# Patient Record
Sex: MALE | Race: WHITE | Employment: OTHER | URBAN - METROPOLITAN AREA
[De-identification: names, ages, dates, MRNs, and addresses within clinical notes are randomized per-mention and may not be internally consistent; named-entity substitution may affect disease eponyms.]

---

## 2017-01-23 ENCOUNTER — HOSPITAL ENCOUNTER (OUTPATIENT)
Dept: GENERAL RADIOLOGY | Age: 82
Discharge: HOME | End: 2017-01-23
Payer: MEDICARE

## 2017-01-23 DIAGNOSIS — C32.0 MALIGNANT NEOPLASM OF GLOTTIS (HCC): ICD-10-CM

## 2017-01-23 PROCEDURE — 71020 XR CHEST PA LAT: CPT

## 2017-03-22 ENCOUNTER — OFFICE VISIT (OUTPATIENT)
Dept: INTERNAL MEDICINE CLINIC | Age: 82
End: 2017-03-22

## 2017-03-22 VITALS
DIASTOLIC BLOOD PRESSURE: 71 MMHG | BODY MASS INDEX: 29.03 KG/M2 | OXYGEN SATURATION: 96 % | WEIGHT: 185 LBS | HEART RATE: 64 BPM | RESPIRATION RATE: 20 BRPM | SYSTOLIC BLOOD PRESSURE: 143 MMHG | HEIGHT: 67 IN | TEMPERATURE: 97.9 F

## 2017-03-22 DIAGNOSIS — Z23 ENCOUNTER FOR IMMUNIZATION: ICD-10-CM

## 2017-03-22 DIAGNOSIS — Z13.1 SCREENING FOR DIABETES MELLITUS: ICD-10-CM

## 2017-03-22 DIAGNOSIS — E78.2 MIXED HYPERLIPIDEMIA: ICD-10-CM

## 2017-03-22 DIAGNOSIS — Z13.31 SCREENING FOR DEPRESSION: ICD-10-CM

## 2017-03-22 DIAGNOSIS — Z13.39 SCREENING FOR ALCOHOLISM: ICD-10-CM

## 2017-03-22 DIAGNOSIS — M17.0 ARTHRITIS OF BOTH KNEES: ICD-10-CM

## 2017-03-22 DIAGNOSIS — Z13.6 SCREENING FOR ISCHEMIC HEART DISEASE: ICD-10-CM

## 2017-03-22 DIAGNOSIS — Z00.00 ROUTINE GENERAL MEDICAL EXAMINATION AT A HEALTH CARE FACILITY: Primary | ICD-10-CM

## 2017-03-22 DIAGNOSIS — I10 ESSENTIAL HYPERTENSION: ICD-10-CM

## 2017-03-22 RX ORDER — DICLOFENAC SODIUM 10 MG/G
2 GEL TOPICAL 4 TIMES DAILY
Qty: 1 EACH | Refills: 5 | Status: SHIPPED | OUTPATIENT
Start: 2017-03-22 | End: 2017-06-07

## 2017-03-22 NOTE — PROGRESS NOTES
This is a Subsequent Medicare Annual Wellness Visit providing Personalized Prevention Plan Services (PPPS) (Performed 12 months after initial AWV and PPPS )    I have reviewed the patient's medical history in detail and updated the computerized patient record. History     No c/o, other than his knees continue to bother him quite a bit. Was prescribed NSAIDs but they elevated his blood pressure. At this time not interested in injection and wants to avoid further surgeries. Sees cardiology for his aortic stenosis. No syncope or excessive shortness of breath at this time. Takes his blood pressure medications, no complaints of side effects. 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    Sensorineural hearing loss     as of 5/28/15 pt wears hearing aids    Unspecified adverse effect of anesthesia 1960s    delayed awaking      Past Surgical History:   Procedure Laterality Date    HX CATARACT REMOVAL Bilateral 2004    HX COLONOSCOPY  2006    HX CYST REMOVAL  1960    HX INTRAOCULAR LENS PROSTHESIS      bilateral    HX LUMBAR LAMINECTOMY  2006    [facility] - Dr Navarro Campbell  prior to 2014    and bicep repair    TOTAL KNEE ARTHROPLASTY Right 8/4/14     Current Outpatient Prescriptions   Medication Sig Dispense Refill    diclofenac (VOLTAREN) 1 % gel Apply 2 g to affected area four (4) times daily. 1 Each 5    aspirin delayed-release 81 mg tablet Take  by mouth every morning.  simvastatin (ZOCOR) 20 mg tablet Take 20 mg by mouth nightly.       metoprolol succinate (TOPROL-XL) 50 mg XL tablet Take 50 mg by mouth every evening.  amLODIPine (NORVASC) 5 mg tablet Take 5 mg by mouth every morning.  losartan (COZAAR) 100 mg tablet Take 1 Tab by mouth daily. (Patient taking differently: Take 100 mg by mouth every morning.) 90 Tab 0    DOCOSAHEXANOIC ACID/EPA (FISH OIL PO) Take  by mouth.  multivitamin (ONE A DAY) tablet Take 1 Tab by mouth daily (after dinner). 80 Tab 3     No Known Allergies  Family History   Problem Relation Age of Onset   Velta Ganser Cancer Father      lung    Stroke Mother     Diabetes Mother     Diabetes Sister     Cancer Sister      Social History   Substance Use Topics    Smoking status: Former Smoker     Packs/day: 1.00     Years: 26.00     Types: Cigarettes     Quit date: 1/1/1976    Smokeless tobacco: Never Used    Alcohol use 0.6 oz/week     0 Standard drinks or equivalent, 1 Shots of liquor per week      Comment: 4-5x week burbon in afternoon     Patient Active Problem List   Diagnosis Code    Hypertension I10    Hyperlipidemia E78.5    DDD (degenerative disc disease), lumbar M51.36    BPH (benign prostatic hyperplasia) N40.0    Aortic stenosis I35.0    Primary localized osteoarthrosis, lower leg M17.10    Right knee DJD M17.9    Hoarse voice quality R49.0    ACP (advance care planning) Z71.89    History of throat cancer Z85.819       Depression Risk Factor Screening:     PHQ 2 / 9, over the last two weeks 3/22/2017   Little interest or pleasure in doing things Several days   Feeling down, depressed or hopeless Several days   Total Score PHQ 2 2   No c/o depression    Alcohol Risk Factor Screening: On any occasion during the past 3 months, have you had more than 4 drinks containing alcohol? No    Do you average more than 14 drinks per week? No      Functional Ability and Level of Safety:     Hearing Loss   severe  Has aides    Activities of Daily Living   Self-care.    Requires assistance with: no ADLs    Fall Risk     Fall Risk Assessment, last 12 mths 3/22/2017   Able to walk? Yes   Fall in past 12 months? No     Abuse Screen   Patient is not abused    Review of Systems   Pertinent items are noted in HPI. Physical Examination     Evaluation of Cognitive Function:  Mood/affect:  neutral  Appearance: age appropriate  Family member/caregiver input: NA    Visit Vitals    /71 (BP 1 Location: Left arm, BP Patient Position: Sitting)    Pulse 64    Temp 97.9 °F (36.6 °C) (Oral)    Resp 20    Ht 5' 7\" (1.702 m)    Wt 185 lb (83.9 kg)    SpO2 96%    BMI 28.98 kg/m2     WD WN male NAD  Heart RRR without murmers clicks or rubs  Lungs CTA  Abdo soft nontender  Ext no edema      Patient Care Team:  Easton Mcnulty MD as PCP - General (Family Practice)  Micah Turner MD (Family Practice)  Dr Den Ray = cards  Dr Uli Reynagasin = ortho  Kathleen Diadema [address]80  Advice/Referrals/Counseling   Education and counseling provided:  Cardiovascular screening blood test      Assessment/Plan     Encounter Diagnoses   Name Primary?  Arthritis of both knees Yes    Essential hypertension     Mixed hyperlipidemia     Routine general medical examination at a health care facility     Screening for alcoholism     Encounter for immunization     Screening for depression     Screening for diabetes mellitus     Screening for ischemic heart disease      Orders Placed This Encounter    Depression Screen Annual    Lipid Panel ([id number])    METABOLIC PANEL, BASIC    diclofenac (VOLTAREN) 1 % gel    diph,Pertuss,Acell,,Tet Vac-PF (ADACEL) 2 Lf-(2.5-5-3-5 mcg)-5Lf/0.5 mL susp   . Discussed possible side affects, precautions, and drug interactions and possible benefits of the medication(s).

## 2017-03-22 NOTE — PROGRESS NOTES
Chief Complaint   Patient presents with   [facility] Annual Wellness Visit     I have reviewed the patient's medical history in detail and updated the computerized patient record. Health Maintenance reviewed. 1. Have you been to the ER, urgent care clinic since your last visit? Hospitalized since your last visit?no    2. Have you seen or consulted any other health care providers outside of the [address] [place] [address] since your last visit? Include any pap smears or colon screening.  no

## 2017-03-22 NOTE — PATIENT INSTRUCTIONS

## 2017-03-22 NOTE — MR AVS SNAPSHOT
Visit Information Date & Time Provider Department Dept. Phone Encounter #  
 3/22/2017  9:45 AM Minna Blizzard,  [place]  [id number] Upcoming Health Maintenance Date Due DTaP/Tdap/Td series (1 - Tdap) 9/27/1952 ZOSTER VACCINE AGE 60> 9/27/1991 GLAUCOMA SCREENING Q2Y 9/27/1996 MEDICARE YEARLY EXAM 9/17/2016 Allergies as of 3/22/2017  Review Complete On: 3/22/2017 By: Dominique Segvoia LPN No Known Allergies Current Immunizations  Reviewed on 11/5/2014 Name Date Influenza High Dose Vaccine PF 10/24/2016 Influenza Vaccine 9/26/2013 Influenza Vaccine (Quad) 9/17/2015  9:45 AM, 10/9/2014 Pneumococcal Conjugate (PCV-13) 6/24/2016 Pneumococcal Polysaccharide (PPSV-23) 12/19/2013 Not reviewed this visit You Were Diagnosed With   
  
 Codes Comments Arthritis of both knees    -  Primary ICD-10-CM: M19.90 ICD-9-CM: 716.96 Essential hypertension     ICD-10-CM: I10 
ICD-9-CM: 401.9 Mixed hyperlipidemia     ICD-10-CM: E78.2 ICD-9-CM: 272.2 Routine general medical examination at a health care facility     ICD-10-CM: Z00.00 ICD-9-CM: V70.0 Screening for alcoholism     ICD-10-CM: Z13.89 ICD-9-CM: V79.1 Encounter for immunization     ICD-10-CM: C26 ICD-9-CM: V03.89 Screening for depression     ICD-10-CM: Z13.89 ICD-9-CM: V79.0 Screening for diabetes mellitus     ICD-10-CM: Z13.1 ICD-9-CM: V77.1 Screening for ischemic heart disease     ICD-10-CM: Z13.6 ICD-9-CM: V81.0 Vitals BP Pulse Temp Resp Height(growth percentile) Weight(growth percentile) 143/71 (BP 1 Location: Left arm, BP Patient Position: Sitting) 64 97.9 °F (36.6 °C) (Oral) 20 5' 7\" (1.702 m) 185 lb (83.9 kg) SpO2 BMI Smoking Status 96% 28.98 kg/m2 Former Smoker BMI and BSA Data Body Mass Index Body Surface Area  
 28.98 kg/m 2 1.99 m 2 Preferred Pharmacy Pharmacy Name Phone [facility] [address] [place] [address], [address] [facility] [phone] Your Updated Medication List  
  
   
This list is accurate as of: 3/22/17 12:00 PM.  Always use your most recent med list.  
  
  
  
  
 aspirin delayed-release 81 mg tablet Take  by mouth every morning. diclofenac 1 % Gel Commonly known as:  VOLTAREN Apply 2 g to affected area four (4) times daily. diph,Pertuss(Acell),Tet Vac-PF 2 Lf-(2.5-5-3-5 mcg)-5Lf/0.5 mL susp Commonly known as:  ADACEL  
0.5 mL by IntraMUSCular route once for 1 dose. FISH OIL PO Take  by mouth.  
  
 losartan 100 mg tablet Commonly known as:  COZAAR Take 1 Tab by mouth daily. metoprolol succinate 50 mg XL tablet Commonly known as:  TOPROL-XL Take 50 mg by mouth every evening.  
  
 multivitamin tablet Commonly known as:  ONE A DAY Take 1 Tab by mouth daily (after dinner). NORVASC 5 mg tablet Generic drug:  amLODIPine Take 5 mg by mouth every morning. simvastatin 20 mg tablet Commonly known as:  ZOCOR Take 20 mg by mouth nightly. Prescriptions Printed Refills diph,Pertuss,Acell,,Tet Vac-PF (ADACEL) 2 Lf-(2.5-5-3-5 mcg)-5Lf/0.5 mL susp 0 Si.5 mL by IntraMUSCular route once for 1 dose. Class: Print Route: IntraMUSCular Prescriptions Sent to Pharmacy Refills  
 diclofenac (VOLTAREN) 1 % gel 5 Sig: Apply 2 g to affected area four (4) times daily. Class: Normal  
 Pharmacy: [address] [place]  [facility], [address] [facility] Ph #: [phone] Route: Topical  
  
We Performed the Following [place] [address] [[id number] [facility]] LIPID PANEL [16945 CPT(R)] METABOLIC PANEL, BASIC [47586 CPT(R)] Patient Instructions Medicare Wellness Visit, Male The best way to live healthy is to have a healthy lifestyle by eating a well-balanced diet, exercising regularly, limiting alcohol and stopping smoking. Regular physical exams and screening tests are another way to keep healthy. Preventive exams provided by your health care provider can find health problems before they become diseases or illnesses. Preventive services including immunizations, screening tests, monitoring and exams can help you take care of your own health. All people over age 72 should have a pneumovax  and and a prevnar shot to prevent pneumonia. These are once in a lifetime unless you and your provider decide differently. All people over 65 should have a yearly flu shot and a tetanus vaccine every 10 years. Screening for diabetes mellitus with a blood sugar test should be done every year. Glaucoma is a disease of the eye due to increased ocular pressure that can lead to blindness and it should be done every year by an eye professional. 
 
Cardiovascular screening tests that check for elevated lipids (fatty part of blood) which can lead to heart disease and strokes should be done every 5 years. Colorectal screening that evaluates for blood or polyps in your colon should be done yearly as a stool test or every five years as a flexible sigmoidoscope or every 10 years as a colonoscopy up to age 76. Men up to age 76 may need a screening blood test for prostate cancer at certain intervals, depending on their personal and family history. This decision is between the patient and his provider. If you have been a smoker or had family history of abdominal aortic aneurysms, you and your provider may decide to schedule an ultrasound test of your aorta. Hepatitis C screening is also recommended for anyone born between [birth year] through [address]. A shingles vaccine is also recommended once in a lifetime after age 61. Your Medicare Wellness Exam is recommended annually. Here is a list of your current Health Maintenance items with a due date: 
Health Maintenance Due Topic Date Due  
 DTaP/Tdap/Td  (1 - Tdap) 09/27/1952  Shingles Vaccine  09/27/1991  Glaucoma Screening   09/27/1996 Liz Armstrong Annual Well Visit  09/17/2016 [facility] SERVICES! Dear Melissa Dillard: Thank you for requesting a [employer] account. Our records indicate that you already have an active [employer] account. You can access your account anytime at [email or web address] Did you know that you can access your hospital and ER discharge instructions at any time in [employer]? You can also review all of your test results from your hospital stay or ER visit. Additional Information If you have questions, please visit the Frequently Asked Questions section of the [employer] website at [email or web address] Remember, [employer] is NOT to be used for urgent needs. For medical emergencies, dial 911. Now available from your iPhone and Android! Please provide this summary of care documentation to your next provider. Your primary care clinician is listed as Andres Diaz. If you have any questions after today's visit, please call [phone].

## 2017-03-22 NOTE — LETTER
3/24/2017 2:39 PM 
 
Mr. Florentino Zambrano [address] [facility] [address] Dear Florentino Zambrano: YOUR RECENT LABS ARE NORMAL Please find your most recent results below. Resulted Orders METABOLIC PANEL, BASIC Result Value Ref Range Glucose 77 65 - 99 mg/dL BUN 29 (H) 8 - 27 mg/dL Creatinine 1.18 0.76 - 1.27 mg/dL GFR est non-AA 56 (L) >59 mL/min/1.73 GFR est AA 65 >59 mL/min/1.73  
 BUN/Creatinine ratio 25 (H) 10 - 22 Sodium 140 134 - 144 mmol/L Potassium 4.5 3.5 - 5.2 mmol/L Chloride 102 96 - 106 mmol/L  
 CO2 19 18 - 29 mmol/L Calcium 9.5 8.6 - 10.2 mg/dL Narrative Performed at:  [address]  [id number] : Jaida Mcfadden MD, Phone:  [phone] LIPID PANEL Result Value Ref Range Cholesterol, total 151 100 - 199 mg/dL Triglyceride 127 0 - 149 mg/dL HDL Cholesterol 42 >39 mg/dL VLDL, calculated 25 5 - 40 mg/dL LDL, calculated 84 0 - 99 mg/dL Narrative Performed at:  [address]  [id number] : Jaida Mcfadden MD, Phone:  [phone] CVD REPORT Result Value Ref Range INTERPRETATION NTAP   
 PDF IMAGE Not applicable Narrative Performed at:  [address] [place] [address]  [id number] : Mendoza Luna PhD, Phone:  [phone] CKD REPORT Result Value Ref Range Interpretation Note Comment:  
   Supplement report is available. Narrative Performed at:  [address] [place] [address]  [id number] : Mendoza Luna PhD, Phone:  [phone] RECOMMENDATIONS: 
KEEP UP THE GOOD WORK Please call me if you have any questions: [phone] Sincerely, Sumit Villa MD

## 2017-03-23 LAB
BUN SERPL-MCNC: 29 MG/DL (ref 8–27)
BUN/CREAT SERPL: 25 (ref 10–22)
CALCIUM SERPL-MCNC: 9.5 MG/DL (ref 8.6–10.2)
CHLORIDE SERPL-SCNC: 102 MMOL/L (ref 96–106)
CHOLEST SERPL-MCNC: 151 MG/DL (ref 100–199)
CO2 SERPL-SCNC: 19 MMOL/L (ref 18–29)
CREAT SERPL-MCNC: 1.18 MG/DL (ref 0.76–1.27)
GLUCOSE SERPL-MCNC: 77 MG/DL (ref 65–99)
HDLC SERPL-MCNC: 42 MG/DL
INTERPRETATION, 910389: NORMAL
INTERPRETATION: NORMAL
LDLC SERPL CALC-MCNC: 84 MG/DL (ref 0–99)
PDF IMAGE, 910387: NORMAL
POTASSIUM SERPL-SCNC: 4.5 MMOL/L (ref 3.5–5.2)
SODIUM SERPL-SCNC: 140 MMOL/L (ref 134–144)
TRIGL SERPL-MCNC: 127 MG/DL (ref 0–149)
VLDLC SERPL CALC-MCNC: 25 MG/DL (ref 5–40)

## 2017-05-24 ENCOUNTER — HOSPITAL ENCOUNTER (OUTPATIENT)
Dept: GENERAL RADIOLOGY | Age: 82
Discharge: HOME | End: 2017-05-24
Payer: MEDICARE

## 2017-05-24 DIAGNOSIS — R06.02 SHORTNESS OF BREATH: ICD-10-CM

## 2017-05-24 DIAGNOSIS — I35.0 AORTIC STENOSIS: ICD-10-CM

## 2017-05-24 PROCEDURE — 71020 XR CHEST PA LAT: CPT

## 2017-06-06 ENCOUNTER — HOSPITAL ENCOUNTER (OUTPATIENT)
Dept: CARDIAC CATH/INVASIVE PROCEDURES | Age: 82
Discharge: HOME | End: 2017-06-07
Attending: INTERNAL MEDICINE | Admitting: INTERNAL MEDICINE
Payer: MEDICARE

## 2017-06-06 LAB — ACT BLD: 343 SECS (ref 79–138)

## 2017-06-06 PROCEDURE — 93458 L HRT ARTERY/VENTRICLE ANGIO: CPT

## 2017-06-06 PROCEDURE — C1894 INTRO/SHEATH, NON-LASER: HCPCS

## 2017-06-06 PROCEDURE — 74011000258 HC RX REV CODE- 258: Performed by: INTERNAL MEDICINE

## 2017-06-06 PROCEDURE — C1769 GUIDE WIRE: HCPCS

## 2017-06-06 PROCEDURE — 74011250637 HC RX REV CODE- 250/637: Performed by: INTERNAL MEDICINE

## 2017-06-06 PROCEDURE — 85347 COAGULATION TIME ACTIVATED: CPT

## 2017-06-06 PROCEDURE — 93005 ELECTROCARDIOGRAM TRACING: CPT

## 2017-06-06 PROCEDURE — 74011250636 HC RX REV CODE- 250/636: Performed by: INTERNAL MEDICINE

## 2017-06-06 PROCEDURE — 74011250636 HC RX REV CODE- 250/636

## 2017-06-06 PROCEDURE — 74011250637 HC RX REV CODE- 250/637

## 2017-06-06 PROCEDURE — 77030028837 HC SYR ANGI PWR INJ COEU -A

## 2017-06-06 PROCEDURE — 74011636320 HC RX REV CODE- 636/320

## 2017-06-06 PROCEDURE — C1760 CLOSURE DEV, VASC: HCPCS

## 2017-06-06 PROCEDURE — 74011000250 HC RX REV CODE- 250

## 2017-06-06 PROCEDURE — C1874 STENT, COATED/COV W/DEL SYS: HCPCS

## 2017-06-06 PROCEDURE — 77030004550 HC CATH ANGI DX PRF MRTM -B

## 2017-06-06 PROCEDURE — 77030019697 HC SYR ANGI INFL MRTM -B

## 2017-06-06 PROCEDURE — 77030029065 HC DRSG HEMO QCLOT ZMED -B

## 2017-06-06 PROCEDURE — C1887 CATHETER, GUIDING: HCPCS

## 2017-06-06 RX ORDER — FENTANYL CITRATE 50 UG/ML
INJECTION, SOLUTION INTRAMUSCULAR; INTRAVENOUS
Status: COMPLETED
Start: 2017-06-06 | End: 2017-06-06

## 2017-06-06 RX ORDER — SODIUM CHLORIDE 0.9 % (FLUSH) 0.9 %
5-10 SYRINGE (ML) INJECTION AS NEEDED
Status: DISCONTINUED | OUTPATIENT
Start: 2017-06-06 | End: 2017-06-07 | Stop reason: HOSPADM

## 2017-06-06 RX ORDER — NALOXONE HYDROCHLORIDE 0.4 MG/ML
0.4 INJECTION, SOLUTION INTRAMUSCULAR; INTRAVENOUS; SUBCUTANEOUS AS NEEDED
Status: DISCONTINUED | OUTPATIENT
Start: 2017-06-06 | End: 2017-06-07 | Stop reason: HOSPADM

## 2017-06-06 RX ORDER — FENTANYL CITRATE 50 UG/ML
25-50 INJECTION, SOLUTION INTRAMUSCULAR; INTRAVENOUS
Status: DISCONTINUED | OUTPATIENT
Start: 2017-06-06 | End: 2017-06-06

## 2017-06-06 RX ORDER — SODIUM CHLORIDE 900 MG/100ML
INJECTION INTRAVENOUS
Status: DISCONTINUED
Start: 2017-06-06 | End: 2017-06-06

## 2017-06-06 RX ORDER — HEPARIN SODIUM 200 [USP'U]/100ML
INJECTION, SOLUTION INTRAVENOUS
Status: COMPLETED
Start: 2017-06-06 | End: 2017-06-06

## 2017-06-06 RX ORDER — MIDAZOLAM HYDROCHLORIDE 1 MG/ML
.5-2 INJECTION, SOLUTION INTRAMUSCULAR; INTRAVENOUS
Status: DISCONTINUED | OUTPATIENT
Start: 2017-06-06 | End: 2017-06-06

## 2017-06-06 RX ORDER — BIVALIRUDIN 250 MG/5ML
INJECTION, POWDER, LYOPHILIZED, FOR SOLUTION INTRAVENOUS
Status: DISCONTINUED
Start: 2017-06-06 | End: 2017-06-06

## 2017-06-06 RX ORDER — METOPROLOL SUCCINATE 50 MG/1
50 TABLET, EXTENDED RELEASE ORAL EVERY EVENING
Status: DISCONTINUED | OUTPATIENT
Start: 2017-06-06 | End: 2017-06-07 | Stop reason: HOSPADM

## 2017-06-06 RX ORDER — LIDOCAINE HYDROCHLORIDE 10 MG/ML
1-20 INJECTION INFILTRATION; PERINEURAL
Status: DISCONTINUED | OUTPATIENT
Start: 2017-06-06 | End: 2017-06-06

## 2017-06-06 RX ORDER — LIDOCAINE HYDROCHLORIDE 10 MG/ML
INJECTION INFILTRATION; PERINEURAL
Status: COMPLETED
Start: 2017-06-06 | End: 2017-06-06

## 2017-06-06 RX ORDER — SODIUM CHLORIDE 9 MG/ML
125 INJECTION, SOLUTION INTRAVENOUS CONTINUOUS
Status: DISCONTINUED | OUTPATIENT
Start: 2017-06-06 | End: 2017-06-06

## 2017-06-06 RX ORDER — HEPARIN SODIUM 200 [USP'U]/100ML
500 INJECTION, SOLUTION INTRAVENOUS ONCE
Status: COMPLETED | OUTPATIENT
Start: 2017-06-06 | End: 2017-06-06

## 2017-06-06 RX ORDER — SODIUM CHLORIDE 0.9 % (FLUSH) 0.9 %
5-10 SYRINGE (ML) INJECTION EVERY 8 HOURS
Status: DISCONTINUED | OUTPATIENT
Start: 2017-06-06 | End: 2017-06-07 | Stop reason: HOSPADM

## 2017-06-06 RX ORDER — ATORVASTATIN CALCIUM 40 MG/1
40 TABLET, FILM COATED ORAL DAILY
Status: DISCONTINUED | OUTPATIENT
Start: 2017-06-07 | End: 2017-06-07 | Stop reason: HOSPADM

## 2017-06-06 RX ORDER — SODIUM CHLORIDE 9 MG/ML
100 INJECTION, SOLUTION INTRAVENOUS CONTINUOUS
Status: DISPENSED | OUTPATIENT
Start: 2017-06-06 | End: 2017-06-07

## 2017-06-06 RX ORDER — NITROGLYCERIN 0.4 MG/1
0.4 TABLET SUBLINGUAL
Status: DISCONTINUED | OUTPATIENT
Start: 2017-06-06 | End: 2017-06-07 | Stop reason: HOSPADM

## 2017-06-06 RX ORDER — AMLODIPINE BESYLATE 5 MG/1
5 TABLET ORAL
Status: DISCONTINUED | OUTPATIENT
Start: 2017-06-07 | End: 2017-06-07 | Stop reason: HOSPADM

## 2017-06-06 RX ORDER — LORAZEPAM 0.5 MG/1
0.5 TABLET ORAL
Status: DISCONTINUED | OUTPATIENT
Start: 2017-06-06 | End: 2017-06-07 | Stop reason: HOSPADM

## 2017-06-06 RX ORDER — MIDAZOLAM HYDROCHLORIDE 1 MG/ML
INJECTION, SOLUTION INTRAMUSCULAR; INTRAVENOUS
Status: COMPLETED
Start: 2017-06-06 | End: 2017-06-06

## 2017-06-06 RX ORDER — ASPIRIN 81 MG/1
81 TABLET ORAL DAILY
Status: DISCONTINUED | OUTPATIENT
Start: 2017-06-07 | End: 2017-06-07 | Stop reason: HOSPADM

## 2017-06-06 RX ORDER — LIDOCAINE HYDROCHLORIDE 10 MG/ML
INJECTION INFILTRATION; PERINEURAL
Status: DISCONTINUED
Start: 2017-06-06 | End: 2017-06-06

## 2017-06-06 RX ORDER — ACETAMINOPHEN 325 MG/1
650 TABLET ORAL
Status: DISCONTINUED | OUTPATIENT
Start: 2017-06-06 | End: 2017-06-07 | Stop reason: HOSPADM

## 2017-06-06 RX ORDER — ONDANSETRON 2 MG/ML
4 INJECTION INTRAMUSCULAR; INTRAVENOUS
Status: DISCONTINUED | OUTPATIENT
Start: 2017-06-06 | End: 2017-06-07 | Stop reason: HOSPADM

## 2017-06-06 RX ADMIN — IOPAMIDOL 125 ML: 755 INJECTION, SOLUTION INTRAVENOUS at 12:02

## 2017-06-06 RX ADMIN — MIDAZOLAM HYDROCHLORIDE 1 MG: 1 INJECTION, SOLUTION INTRAMUSCULAR; INTRAVENOUS at 12:48

## 2017-06-06 RX ADMIN — FENTANYL CITRATE 50 MCG: 50 INJECTION, SOLUTION INTRAMUSCULAR; INTRAVENOUS at 12:02

## 2017-06-06 RX ADMIN — LIDOCAINE HYDROCHLORIDE 3 ML: 10 INJECTION, SOLUTION INFILTRATION; PERINEURAL at 12:12

## 2017-06-06 RX ADMIN — HEPARIN SODIUM 1000 UNITS: 200 INJECTION, SOLUTION INTRAVENOUS at 12:03

## 2017-06-06 RX ADMIN — BIVALIRUDIN 1.75 MG/KG/HR: 250 INJECTION, POWDER, LYOPHILIZED, FOR SOLUTION INTRAVENOUS at 12:43

## 2017-06-06 RX ADMIN — MIDAZOLAM HYDROCHLORIDE 1 MG: 1 INJECTION, SOLUTION INTRAMUSCULAR; INTRAVENOUS at 12:02

## 2017-06-06 RX ADMIN — SODIUM CHLORIDE 125 ML/HR: 900 INJECTION, SOLUTION INTRAVENOUS at 08:38

## 2017-06-06 RX ADMIN — Medication 50 MCG: at 12:02

## 2017-06-06 RX ADMIN — TICAGRELOR 180 MG: 90 TABLET ORAL at 12:46

## 2017-06-06 RX ADMIN — NITROGLYCERIN 300 MCG: 5 INJECTION, SOLUTION INTRAVENOUS at 13:04

## 2017-06-06 RX ADMIN — MIDAZOLAM HYDROCHLORIDE 1 MG: 1 INJECTION INTRAMUSCULAR; INTRAVENOUS at 12:02

## 2017-06-06 RX ADMIN — FENTANYL CITRATE 50 MCG: 50 INJECTION, SOLUTION INTRAMUSCULAR; INTRAVENOUS at 12:56

## 2017-06-06 RX ADMIN — Medication 10 ML: at 21:36

## 2017-06-06 RX ADMIN — METOPROLOL SUCCINATE 50 MG: 50 TABLET, EXTENDED RELEASE ORAL at 19:13

## 2017-06-06 RX ADMIN — LIDOCAINE HYDROCHLORIDE 3 ML: 10 INJECTION INFILTRATION; PERINEURAL at 12:12

## 2017-06-06 NOTE — IP AVS SNAPSHOT
[address] [facility] 
[phone] Patient: Braden Jovel MRN: [id number] BIC:7/17/9006 You are allergic to the following No active allergies Recent Documentation Height Weight BMI Smoking Status 1.702 m 83.9 kg 28.98 kg/m2 Former Smoker Emergency Contacts Name Discharge Info Relation Home Work Mobile Leeann Narvaez  Daughter [21] [phone] Leeann Narvaez  Child [2] [phone] About your hospitalization You were admitted on:  June 6, 2017 You last received care in the:  [facility] You were discharged on:  June 7, 2017 Unit phone number:  [phone] Why you were hospitalized Your primary diagnosis was:  Not on File Providers Seen During Your Hospitalizations Provider Role Specialty Primary office phone Alexander Hicks MD Attending Provider Cardiology [phone] Your Primary Care Physician (PCP) Primary Care Physician Office Phone Office Fax Natty Armando [phone] Follow-up Information Follow up With Details Comments Contact Info Alexander Hicks MD Schedule an appointment as soon as possible for a visit in 1 month  [address] HSA993 [facility] 
[phone] Wandy Horton MD   [address] [place], [place] [phone] Current Discharge Medication List  
  
START taking these medications Dose & Instructions Dispensing Information Comments Morning Noon Evening Bedtime  
 atorvastatin 40 mg tablet Commonly known as:  LIPITOR Replaces:  simvastatin 20 mg tablet Your last dose was: Your next dose is:    
   
   
 Dose:  40 mg Take 1 Tab by mouth daily. Quantity:  30 Tab Refills:  12  
     
   
   
   
  
 ticagrelor 90 mg tablet Commonly known as:  [employer] Your last dose was: Your next dose is:    
   
   
 Dose:  90 mg Take 1 Tab by mouth two (2) times a day. Quantity:  60 Tab Refills:  12 CONTINUE these medications which have CHANGED Dose & Instructions Dispensing Information Comments Morning Noon Evening Bedtime  
 losartan 100 mg tablet Commonly known as:  COZAAR What changed:  when to take this Your last dose was: Your next dose is:    
   
   
 Dose:  100 mg Take 1 Tab by mouth daily. Quantity:  90 Tab Refills:  0 CONTINUE these medications which have NOT CHANGED Dose & Instructions Dispensing Information Comments Morning Noon Evening Bedtime  
 aspirin delayed-release 81 mg tablet Your last dose was: Your next dose is: Take  by mouth every morning. Refills:  0  
     
   
   
   
  
 FISH OIL PO Your last dose was: Your next dose is: Take  by mouth two (2) times a day. Refills:  0  
     
   
   
   
  
 metoprolol succinate 50 mg XL tablet Commonly known as:  TOPROL-XL Your last dose was: Your next dose is:    
   
   
 Dose:  50 mg Take 50 mg by mouth every evening. Refills:  0  
     
   
   
   
  
 multivitamin tablet Commonly known as:  ONE A DAY Your last dose was: Your next dose is:    
   
   
 Dose:  1 Tab Take 1 Tab by mouth daily (after dinner). Quantity:  90 Tab Refills:  3 NORVASC 5 mg tablet Generic drug:  amLODIPine Your last dose was: Your next dose is:    
   
   
 Dose:  5 mg Take 5 mg by mouth every morning. Refills:  0 STOP taking these medications   
 diclofenac 1 % Gel Commonly known as:  VOLTAREN  
   
  
 simvastatin 20 mg tablet Commonly known as:  ZOCOR Replaced by:  atorvastatin 40 mg tablet Where to Get Your Medications Information on where to get these meds will be given to you by the nurse or doctor. ! Ask your nurse or doctor about these medications  
  atorvastatin 40 mg tablet  
 ticagrelor 90 mg tablet Discharge Instructions [address]    [phone] [place], [address] [facility]    [email or web address] Patient Discharge Instructions Alexandro Knight / [id number] : 1931 Admitted 2017 Discharged 2017 · It is important that you take the medication exactly as they are prescribed. · Keep your medication in the bottles provided by the pharmacist and keep a list of the medication names, dosages, and times to be taken in your wallet. · Do not take other medications without consulting your doctor. BRING ALL OF YOUR MEDICINES TO YOUR OFFICE VISIT with Dr. Farideh Feliciano. Cardiac Catheterization  Discharge Instructions ? Do not drive, operate any machinery, or sign any legal documents for 24 hours after your procedure. You must have someone to drive you home. ? You may take a shower 24 hours after your cardiac catheterization. Be sure to get the dressing wet and then remove it; gently wash the area with warm soapy water. Pat dry and leave open to air. To help prevent infections, be sure to keep the cath site clean and dry. No lotions, creams, powders, ointments, etc. in the cath site for approximately 1 week. ? Do not take a tub bath, get in a hot tub or swimming pool for approximately 5 days or until the cath site is completely healed. ? No strenuous activity or heavy lifting over 10 lbs. for 7 days. ? Drink plenty of fluids for 24-48 hours after your cath to flush the contrast dye from your kidneys. No alcoholic beverages for 24 hours. You may resume your previous diet (low fat, low cholesterol) after your cath. ?  After your cath, some bruising or discomfort is common during the healing process. Tylenol, 1-2 tablets every 6 hours as needed, is recommended if you experience any discomfort. If you experience any signs or symptoms of infection such as fever, chills, or poorly healing incision, persistent tenderness or swelling in the groin, redness and/or warmth to the touch, numbness, significant tingling or pain at the groin site or affected extremity, rash, drainage from the cath site, or if the leg feels tight or swollen, call your physician right away. ? If bleeding at the cath site occurs, take a clean gauze pad and apply direct pressure to the groin just above the puncture site. Call 911 immediately, and continue to apply direct pressure until an ambulance gets to your location. If Plavix, or Brilinta, or Effient is prescribed with your aspirin, you must take them to prevent your stent from clotting. You will likely need them for at least 1 to 2 years. Follow-up:  
Follow-up Information Follow up With Details Comments Contact Info Jenifer Thorne MD Schedule an appointment as soon as possible for a visit in 1 month  [address] HDA[address] [facility] 
[phone] Information obtained by : 
I understand that if any problems occur once I am at home I am to contact my physician. I understand and acknowledge receipt of the instructions indicated above. R.N.'s Signature                                                                  Date/Time Patient or Representative Signature                                                          Date/Time Jenifer Thorne MD 
   
[address]    [phone] [address]    [email or web address] 
 Discharge Orders None ACO Transitions of Care Introducing [employer] Amna Zamora offers a voluntary care coordination program to provide high quality service and care to [place] fee-for-service beneficiaries. Adams Goldberg was designed to help you enhance your health and well-being through the following services: ? Transitions of Care  support for individuals who are transitioning from one care setting to another (example: Hospital to home). ? Chronic and Complex Care Coordination  support for individuals and caregivers of those with serious or chronic illnesses or with more than one chronic (ongoing) condition and those who take a number of different medications. If you meet specific medical criteria, a [address] [place] [address] Rd may call you directly to coordinate your care with your primary care physician and your other care providers. For questions about the [facility] programs, please, contact your physicians office. For general questions or additional information about Accountable Care Organizations: 
Please visit www.medicare.gov/acos. html or call 1-800-MEDICARE (4-586.802.8607) TTY users should call 1-807.413.6152. [employer] Announcement We are excited to announce that we are making your provider's discharge notes available to you in [employer]. You will see these notes when they are completed and signed by the physician that discharged you from your recent hospital stay. If you have any questions or concerns about any information you see in [employer], please call the Health Information Department where you were seen or reach out to your Primary Care Provider for more information about your plan of care. Introducing [facility] & HEALTH SERVICES! Dear Ayesha Dick: Thank you for requesting a [employer] account.   Our records indicate that you already have an active [employer] account. You can access your account anytime at [email or web address] Did you know that you can access your hospital and ER discharge instructions at any time in [employer]? You can also review all of your test results from your hospital stay or ER visit. Additional Information If you have questions, please visit the Frequently Asked Questions section of the [employer] website at [email or web address] Remember, [employer] is NOT to be used for urgent needs. For medical emergencies, dial 911. Now available from your iPhone and Android! General Information Please provide this summary of care documentation to your next provider. Patient Signature:  ____________________________________________________________ Date:  ____________________________________________________________  
  
Nilsona Star Provider Signature:  ____________________________________________________________ Date:  ____________________________________________________________

## 2017-06-06 NOTE — PROGRESS NOTES
1916 - Bedside report from RN. ANIBAL on monitor. No pain or complaints. R groin benign, +PPP. Up in chair. 2100 - ambulation in hallway 250 feet with walker without complaints. R groin benign. +PPP.     2300 - VSS. In bed ready for HS. R groin benign. 0700 - bedside report to RN. R groin benign. +PPP.

## 2017-06-06 NOTE — PROGRESS NOTES
TRANSFER - OUT REPORT:    Verbal report given to Dharmesh Pugh RN on Chencho Ng  being transferred to [facility] for routine post - op       Report consisted of patients Situation, Background, Assessment and   Recommendations(SBAR). Information from the following report(s) SBAR, Procedure Summary, MAR, Recent Results and Cardiac Rhythm NSR was reviewed with the receiving nurse. Lines:   Peripheral IV 06/06/17 Left Antecubital (Active)        Opportunity for questions and clarification was provided.       Patient transported with:   Registered Nurse

## 2017-06-06 NOTE — PROGRESS NOTES
Prelim Cath Report    PreProc Dx:    [x]CAD/angina     [x]Abnormal MPI  PostProc Dx: See below. MD/Assistants: Louisa/None    Moderate sedation start time: 1202  Moderate sedation stop time:  1225  Sedation used: Versed/Fentnyl. Sedation was administered by a cath lab nurse; I directly supervised the cath lab staff as the patient was monitored for the duration of the procedure. Procedure:     [x]LHC       [x]Coronary Angiography                     Findings:  LV:  EDP: 14.               AS:   [x] Mean gradient: 29 mmHg, c/w moderate AS. LM:    [x]Normal;    LAD:  Minor luminal irregularities with no focal stenosis. Circ:  Minor luminal irregularities with no focal stenosis. RCA:  Stenosis:     [x]Moderate-Severe         No specimens removed. Minimal blood loss. No apparent complications. Plan:      [x]PCI       For other plans, see orders.     Lorena Ellis MD

## 2017-06-06 NOTE — PROCEDURES
[facility], [address]   CORONARY ANGIOPLASTY       Name:  Trinh Land   MR#:  [id number]   :  1931   Account #:  [de-identified]        Date of Adm:  2017       CINE NUMBER:     PROCEDURE PERFORMED   1. Percutaneous transluminal coronary angioplasty and stenting of the   distal right coronary artery with a drug-eluting stent. 2. Sedation. Sedation was administered by cath lab staff and I supervised them as   they monitored the patient for the duration of the procedure. DURATION: 27 minutes. ESTIMATED BLOOD LOSS: Less than 50 mL. SPECIMENS REMOVED: None. ANESTHESIA:  Versed and fentanyl. INDICATION: High risk stress test in a patient with Class 3 angina. COMPLICATIONS: None. TECHNIQUE: 6-Senegalese right femoral artery. DESCRIPTION OF PROCEDURE: The patient underwent a diagnostic   cardiac catheterization by Dr. Tori Cloud and remained on the table for   percutaneous coronary interventions. A 6-Senegalese sheath was already   placed by Dr. Tori Cloud. The patient was anticoagulated with intravenous   bivalirudin and received 180 mg of ticagrelor on the field. A 6-Senegalese   JR4 guiding catheter was advanced to the ostium of the right coronary   artery. The lesion was direct stented with a 2.75 x 28 Ostrander Synergy   drug-eluting stent, reducing an 80% residual stenosis to 0%. At the   completion of the procedure, hemostasis was obtained via a 6-Senegalese   Angio-Seal.    CONCLUSION   1. Successful percutaneous transluminal coronary angioplasty and   stenting of the distal right coronary artery from 80% to 0% utilizing a   2.75 x 28 Ostrander Synergy drug-eluting stent. Initial ATILIO flow was 3. Final ATILIO flow was 3.   2. The patient will continue aspirin therapy indefinitely, Brilinta for a   minimum of 1 year. Bivalirudin was used during the time of the   procedure.         Chemo Castellon MD      SR / Yves Saenz   D: 06/06/2017   13:21   T:  06/06/2017   13:57   Job #:  [id number]

## 2017-06-06 NOTE — PROCEDURES
BRIEF OPERATIVE NOTE    Date of Procedure: 6/6/2017   Procedure: Cardiac catheterization    Preoperative Diagnosis: Coronary artery disease  Postoperative Diagnosis: Coronary artery disease     Surgeon/assistant: Guilherme Zaman MD    Anesthesia: Conscious sedation  Estimated Blood Loss: <50cc  Specimens: None    Findings:      Intervention: PCI of dRCA with EZRA    Complications: None  Non-coronary Implants: None

## 2017-06-07 VITALS
SYSTOLIC BLOOD PRESSURE: 145 MMHG | DIASTOLIC BLOOD PRESSURE: 76 MMHG | HEIGHT: 67 IN | TEMPERATURE: 98.3 F | WEIGHT: 185 LBS | BODY MASS INDEX: 29.03 KG/M2 | HEART RATE: 72 BPM | RESPIRATION RATE: 16 BRPM | OXYGEN SATURATION: 95 %

## 2017-06-07 LAB
ANION GAP BLD CALC-SCNC: 8 MMOL/L (ref 5–15)
ATRIAL RATE: 64 BPM
ATRIAL RATE: 65 BPM
BUN SERPL-MCNC: 24 MG/DL (ref 6–20)
BUN/CREAT SERPL: 21 (ref 12–20)
CALCIUM SERPL-MCNC: 8.9 MG/DL (ref 8.5–10.1)
CALCULATED P AXIS, ECG09: -11 DEGREES
CALCULATED P AXIS, ECG09: 43 DEGREES
CALCULATED R AXIS, ECG10: 20 DEGREES
CALCULATED R AXIS, ECG10: 41 DEGREES
CALCULATED T AXIS, ECG11: 55 DEGREES
CALCULATED T AXIS, ECG11: 59 DEGREES
CHLORIDE SERPL-SCNC: 109 MMOL/L (ref 97–108)
CO2 SERPL-SCNC: 22 MMOL/L (ref 21–32)
CREAT SERPL-MCNC: 1.13 MG/DL (ref 0.7–1.3)
DIAGNOSIS, 93000: NORMAL
DIAGNOSIS, 93000: NORMAL
GLUCOSE SERPL-MCNC: 91 MG/DL (ref 65–100)
P-R INTERVAL, ECG05: 132 MS
P-R INTERVAL, ECG05: 164 MS
POTASSIUM SERPL-SCNC: 4 MMOL/L (ref 3.5–5.1)
Q-T INTERVAL, ECG07: 428 MS
Q-T INTERVAL, ECG07: 430 MS
QRS DURATION, ECG06: 140 MS
QRS DURATION, ECG06: 142 MS
QTC CALCULATION (BEZET), ECG08: 443 MS
QTC CALCULATION (BEZET), ECG08: 445 MS
SODIUM SERPL-SCNC: 139 MMOL/L (ref 136–145)
VENTRICULAR RATE, ECG03: 64 BPM
VENTRICULAR RATE, ECG03: 65 BPM

## 2017-06-07 PROCEDURE — 36415 COLL VENOUS BLD VENIPUNCTURE: CPT | Performed by: INTERNAL MEDICINE

## 2017-06-07 PROCEDURE — 74011250637 HC RX REV CODE- 250/637: Performed by: INTERNAL MEDICINE

## 2017-06-07 PROCEDURE — 93005 ELECTROCARDIOGRAM TRACING: CPT

## 2017-06-07 PROCEDURE — 80048 BASIC METABOLIC PNL TOTAL CA: CPT | Performed by: INTERNAL MEDICINE

## 2017-06-07 RX ORDER — ATORVASTATIN CALCIUM 40 MG/1
40 TABLET, FILM COATED ORAL DAILY
Qty: 30 TAB | Refills: 12 | Status: SHIPPED | OUTPATIENT
Start: 2017-06-07 | End: 2018-11-26

## 2017-06-07 RX ADMIN — AMLODIPINE BESYLATE 5 MG: 5 TABLET ORAL at 06:30

## 2017-06-07 RX ADMIN — TICAGRELOR 90 MG: 90 TABLET ORAL at 06:30

## 2017-06-07 RX ADMIN — ATORVASTATIN CALCIUM 40 MG: 40 TABLET, FILM COATED ORAL at 08:33

## 2017-06-07 RX ADMIN — ASPIRIN 81 MG: 81 TABLET, COATED ORAL at 08:33

## 2017-06-07 RX ADMIN — Medication 10 ML: at 03:36

## 2017-06-07 RX ADMIN — LORAZEPAM 0.5 MG: 0.5 TABLET ORAL at 00:01

## 2017-06-07 NOTE — CARDIO/PULMONARY
CP REHAB NOTE    Chart Review: Patient admitted for cardiac cath. S/p PCI 6/6/2017  Medical History: hyperlipidemia, HTN, CKD, BPH  Former Smoker    Met with patient and family members for post cath teaching. Patient received handouts on Benefits of Exercise, What is Heart Disease, and Treating CAD. Printed material given and discussed re: heart healthy habits, the cardiac diet, medication management, what to expect following coronary angioplasty, and post cardiac catheterization instructions. Discussed post catheterization restrictions including no lifting, no tub baths and no straining for 7 days. Also discussed what to do if bleeding or bruising at the cath insertion site is observed. Reviewed the cardiac diet (low NA/fat/CHOL), the importance of medication compliance, monitoring for any unusual signs & symptoms and when to call the doctor. Contact information given for Cardiac Rehab. Patient and family are interested but feel they need to discuss at home first. They do live almost an hour away.

## 2017-06-07 NOTE — DISCHARGE INSTRUCTIONS
[address]    [phone]  [place], [place] [address]    [email or web address]    Patient Discharge Instructions    Yesenia Schmitz / [id number] : 1931    Admitted 2017 Discharged 2017     · It is important that you take the medication exactly as they are prescribed. · Keep your medication in the bottles provided by the pharmacist and keep a list of the medication names, dosages, and times to be taken in your wallet. · Do not take other medications without consulting your doctor. BRING ALL OF YOUR MEDICINES TO YOUR OFFICE VISIT with Dr. Leonard Paz. Cardiac Catheterization  Discharge Instructions     Do not drive, operate any machinery, or sign any legal documents for 24 hours after your procedure. You must have someone to drive you home.  You may take a shower 24 hours after your cardiac catheterization. Be sure to get the dressing wet and then remove it; gently wash the area with warm soapy water. Pat dry and leave open to air. To help prevent infections, be sure to keep the cath site clean and dry. No lotions, creams, powders, ointments, etc. in the cath site for approximately 1 week.  Do not take a tub bath, get in a hot tub or swimming pool for approximately 5 days or until the cath site is completely healed.  No strenuous activity or heavy lifting over 10 lbs. for 7 days.  Drink plenty of fluids for 24-48 hours after your cath to flush the contrast dye from your kidneys. No alcoholic beverages for 24 hours. You may resume your previous diet (low fat, low cholesterol) after your cath.  After your cath, some bruising or discomfort is common during the healing process. Tylenol, 1-2 tablets every 6 hours as needed, is recommended if you experience any discomfort.   If you experience any signs or symptoms of infection such as fever, chills, or poorly healing incision, persistent tenderness or swelling in the groin, redness and/or warmth to the touch, numbness, significant tingling or pain at the groin site or affected extremity, rash, drainage from the cath site, or if the leg feels tight or swollen, call your physician right away.  If bleeding at the cath site occurs, take a clean gauze pad and apply direct pressure to the groin just above the puncture site. Call 911 immediately, and continue to apply direct pressure until an ambulance gets to your location. If Plavix, or Brilinta, or Effient is prescribed with your aspirin, you must take them to prevent your stent from clotting. You will likely need them for at least 1 to 2 years. Follow-up:   Follow-up Information     Follow up With Details Comments Jaquelin Russ MD Schedule an appointment as soon as possible for a visit in 1 month  [address]  [id number]  [address] [phone]            Information obtained by :  I understand that if any problems occur once I am at home I am to contact my physician. I understand and acknowledge receipt of the instructions indicated above. R.N.'s Signature                                                                  Date/Time                                                                                                                                              Patient or Representative Signature                                                          Date/Time      Kaleen Bloch, MD      [address]    [phone]  [place], [address] [facility]    [email or web address]

## 2017-06-07 NOTE — PROCEDURES
Procedure: Cardiac cath. Date: 6/6/2017     Moderate sedation start time: 1202  Moderate sedation stop time: 1225  Sedation used: Versed/Fentnyl. Sedation was administered by a cath lab nurse; I directly supervised the cath lab staff as the patient was monitored for the duration of the procedure. INDICATION/PreDx: Progressive angina; Abnl MPI. CKD. Known mild-to-moderate AS. PROCEDURES PERFORMED   1. Left heart catheterization. 2. Selective coronary angiography. DESCRIPTION OF PROCEDURE: After informed consent of all potential complications, the patient was prepped and draped in the usual sterile manner. Lidocaine 1% was utilized for local anesthesia. Conscious sedation per flow sheet. The right femoral artery was entered and a 6-Kazakh sheath placed without complication using modified Seldinger technique. The sheath was flushed at all catheter exchanges and all catheters were advanced over a guidewire under direct fluoroscopic visualization. Left coronary angiography was performed in multiple views using a 6-Kazakh JL4 catheter. Right coronary angiography was performed in multiple views using a JR4 catheter. Left heart catheterization was performed in HUMMEL projection using a 6-Kazakh straight pigtail. No apparent complications. Estimated blood loss minimal. SPECIMENS: No specimens. No LVgram due to CKD and need for PCI. FINDINGS   LEFT MAIN:  Medium vessel; Scattered minor luminal irregularities, no focal stenosis. LAD: Medium vessel; Scattered minor luminal irregularities, no focal stenosis. Several trivial diagonals. Ramus: Medium, bifurcating, extensive vessel. Scattered minor luminal irregularities, no focal stenosis. CIRCUMFLEX: Medium vessel; Scattered minor luminal irregularities, no focal stenosis. Several trivial OMs. Small distal OM. RCA: Large dominant vessel; Scattered 30% plaque; 75% itn-ta-bnhevk stenosis.     LV:  EDP 14.  29 mmHG gradient across AoV on pull-back, c/w moderate AS. CONCLUSION/post procedure Dx:   1. Single vessel CAD in RCA. 2. Moderate AS by mean gradient. PLAN: PCI RCA ongoing.

## 2017-06-07 NOTE — DISCHARGE SUMMARY
6/7/2017 8:16 AM  Patient without complaints. Subjective: Blossom Hernadez reports   Chest pain X none  consistent with:  Non-cardiac CP         Atypical CP     None now  On going  Anginal CP     Dyspnea X none  at rest  with exertion         improved  unchanged  worse              PND X none  overnight       Orthopnea X none  improved  unchanged  worse   Presyncope X none  improved  unchanged  worse     Ambulated in hallway without symptoms  x Yes   Ambulated in room without symptoms x Yes     PE: Last VS:   Visit Vitals    /76 (BP 1 Location: Left arm, BP Patient Position: At rest)    Pulse 72    Temp 98.3 °F (36.8 °C)    Resp 16    Ht 5' 7\" (1.702 m)    Wt 83.9 kg (185 lb)    SpO2 95%    BMI 28.98 kg/m2     CTA, normal resp effort  RRR no new murmur  abd benign  A/O, non-focal    Cath site without hematoma, bleeding or new bruit. Distal pulses at baseline. Telemetry independently reviewed x sinus  chronic afib  parox afib  NSVT     ECG independently reviewed x NSR  afib x no significant changes  NSST-Tw chgs    no new ECG provided for review     Lab results reviewed and appropriate;     Plan: D/C. Follow-up Information     Follow up With Details Comments Jaquelin Russ MD Schedule an appointment as soon as possible for a visit in 1 month  [address] [facility]            Current Discharge Medication List      START taking these medications    Details   atorvastatin (LIPITOR) 40 mg tablet Take 1 Tab by mouth daily. Qty: 30 Tab, Refills: 12      ticagrelor (BRILINTA) 90 mg tablet Take 1 Tab by mouth two (2) times a day. Qty: 60 Tab, Refills: 12         CONTINUE these medications which have NOT CHANGED    Details   aspirin delayed-release 81 mg tablet Take  by mouth every morning. metoprolol succinate (TOPROL-XL) 50 mg XL tablet Take 50 mg by mouth every evening.       amLODIPine (NORVASC) 5 mg tablet Take 5 mg by mouth every morning.      multivitamin (ONE A DAY) tablet Take 1 Tab by mouth daily (after dinner). Qty: 90 Tab, Refills: 3    Associated Diagnoses: Cold extremity without peripheral vascular disease      losartan (COZAAR) 100 mg tablet Take 1 Tab by mouth daily. Qty: 90 Tab, Refills: 0      DOCOSAHEXANOIC ACID/EPA (FISH OIL PO) Take  by mouth two (2) times a day.     Associated Diagnoses: Lipid disorder         STOP taking these medications       diclofenac (VOLTAREN) 1 % gel Comments:   Reason for Stopping:         simvastatin (ZOCOR) 20 mg tablet Comments:   Reason for Stopping:

## 2017-06-07 NOTE — PROGRESS NOTES
Patient ambulated in hallway without difficulty. Dressing CDI. No complaints. Discharge instructions reviewed with patient; to be discharged to home with family. Site care instructions reviewed; site(s) CDI. Patient instructed on which medications to continue, which to start, and which to stop. Prescriptions given to patient. Medication info provided and reviewed with patient. Follow-up appointment information given; follow-up appointment to be made by patient. IV and tele box removed. Opportunity for questions provided; all questions answered. All belongings returned. Patient wheeled to front door via wheelchair by volunteer; to be transported home by family.

## 2017-06-13 ENCOUNTER — PATIENT OUTREACH (OUTPATIENT)
Dept: INTERNAL MEDICINE CLINIC | Age: 82
End: 2017-06-13

## 2017-06-13 ENCOUNTER — TELEPHONE (OUTPATIENT)
Dept: INTERNAL MEDICINE CLINIC | Age: 82
End: 2017-06-13

## 2017-06-13 NOTE — PROGRESS NOTES
Low Risk            3       Total Score        3 Relationship with PCP        Criteria that do not apply:    Patient Living Status    Patient Length of Stay > 5    More than 1 Admission in calendar year    Patient Insurance is Medicare, Medicaid or Self Pay    Charlson Comorbidity Score      ]  Placido Hernandez is a 80 y.o. male   This patient was received as a referral from [address] [place] [address] of patients top three medical problems:      Problem 1:      Problem 2:      Problem 3:     Patient's challenges to self management identified: uses walker, lives alone    Medication Management:  Cholesterol med changed to lipitor and started on [employer], Referrals, and Durable Medical Equipment: none at this time    Follow up appointments:  7/6/17 with cardiologist  Goals      Attends follow-up appointments as directed. 6/13/17 Pt has f/u appt with cardiologist 7/6/17, pt refusing appt with  at this time has appt in September. Explained to patient what a NN is and does, Patient verbalized understanding and denies questions. Patient agrees to case management and follow up phone calls/meeting with NN. Pt lives alone but his daughter calls and checks on him every night. Pt states in the home he uses his walker but when out pt uses cane. Pt does drive himself. Pt has been trying to rest after cardiac cath. Pt denies any chest pain or shortness of breath at this time. Pt has f/u with cardiologist 7/6/17 and did not want appt with  at this time. Pt states he is staying hydrated, he used the \"big mug\" he got in the hospital to make sure he drinks enough water and he states he likes to eat 3 meals a day. Pt states he has no needs at this time. Patient verbalized understanding of all information discussed. Patient has this Nurse Navigators contact information for any further questions, concerns, or needs. This note will not be viewable in [address]

## 2017-07-12 ENCOUNTER — HOSPITAL ENCOUNTER (OUTPATIENT)
Dept: CARDIAC REHAB | Age: 82
Discharge: HOME | End: 2017-07-12
Payer: MEDICARE

## 2017-07-12 VITALS — HEIGHT: 67 IN | WEIGHT: 186.38 LBS | BODY MASS INDEX: 29.25 KG/M2

## 2017-07-12 VITALS — WEIGHT: 186.4 LBS | BODY MASS INDEX: 29.19 KG/M2

## 2017-07-12 DIAGNOSIS — Z95.5 STENTED CORONARY ARTERY: ICD-10-CM

## 2017-07-12 PROCEDURE — 93798 PHYS/QHP OP CAR RHAB W/ECG: CPT

## 2017-07-12 NOTE — CARDIO/PULMONARY
Cardiopulmonary Rehab Orientation:   Met with Mr. Sam Saenz and his daughter for the initial session. Mr. Rosanna Venegas is an 80year old patient of Dr. Sacha Mclain who presents to rehab for cardiac strengthening and conditioning, S/P PCI/EZRA on 6/6/17. LVEF of 65%. Patient has a history of HTN, hyperlipidemia, aortic stenosis, previous right knee replacement, DDD of left knee, BPH, and [place]. He is up to date on immunizations including flu and pneumonia. No known drug allergies. Pt completed six minute walk on the treadmill without difficulty, walking a total of 196.47 meters. He achieved a high HR of 89 with RPE of 11 and RPD 0. Mets were 1.3. Psychosocial:  Pt is a . Daughter is very supportive. He lives alone near water and enjoys going to the water with family. He has family in the [facility] (two sisters and many nieces and nephews) and frequently socializes with them. He attends [employer]. He scored 9 on depression scale. He does not take any psychotropic meds and denies any stress other than related to his \"knees. \"  He has some frustration with not being able to do what he wants due to limitations of his knees. Lungs revealed mild expiratory wheeze. Pt denied cough. BLE showed trace edema. Pt using a cane. He had a right knee replacement 3 years ago and needs replacement of the left knee. Pt does daily exercises for legs as prescribed by Physical Therapy. He was encouraged to incorporate some upper body exercise in his home routine. His walking is limited due to pain in knees. Limitations to exercise are primarily related to knees. Pt had knee replacement on the right side three years ago and needs to have a replacement on the left. He is [place] and wears hearing aids in both ears.       Patient was given an overview of cardiac rehab program, placement of electrode/leads, and rationale for program. Patient and daughter viewed the cardiac rehab DVD and an education notebook was given. Resting vital signs revealed a blood pressure of 169/71 in the right arm and 154/69 in the left arm. Heart rhythm on the monitor was a normal sinus rhythm. Oxygen saturation was 96% on room air. BMI 29.2   Patient's identified goals are  1. To eat less fried foods. 2.  To incorporate some upper body exercises in exercise regimen at home. 3.  To lose 5 lbs.

## 2017-07-17 ENCOUNTER — DOCUMENTATION ONLY (OUTPATIENT)
Dept: INTERNAL MEDICINE CLINIC | Age: 82
End: 2017-07-17

## 2017-07-18 ENCOUNTER — HOSPITAL ENCOUNTER (OUTPATIENT)
Dept: CARDIAC REHAB | Age: 82
Discharge: HOME | End: 2017-07-18
Attending: INTERNAL MEDICINE
Payer: MEDICARE

## 2017-07-18 VITALS — WEIGHT: 185.4 LBS | BODY MASS INDEX: 29.04 KG/M2

## 2017-07-18 DIAGNOSIS — Z95.5 STENTED CORONARY ARTERY: ICD-10-CM

## 2017-07-18 PROCEDURE — 93798 PHYS/QHP OP CAR RHAB W/ECG: CPT

## 2017-07-20 ENCOUNTER — HOSPITAL ENCOUNTER (OUTPATIENT)
Dept: CARDIAC REHAB | Age: 82
Discharge: HOME | End: 2017-07-20
Attending: INTERNAL MEDICINE
Payer: MEDICARE

## 2017-07-20 VITALS — WEIGHT: 182.8 LBS | BODY MASS INDEX: 28.63 KG/M2

## 2017-07-20 PROCEDURE — 93798 PHYS/QHP OP CAR RHAB W/ECG: CPT

## 2017-07-25 ENCOUNTER — HOSPITAL ENCOUNTER (OUTPATIENT)
Dept: CARDIAC REHAB | Age: 82
Discharge: HOME | End: 2017-07-25
Attending: INTERNAL MEDICINE
Payer: MEDICARE

## 2017-07-25 VITALS — WEIGHT: 182.4 LBS | BODY MASS INDEX: 28.57 KG/M2

## 2017-07-25 PROCEDURE — 93798 PHYS/QHP OP CAR RHAB W/ECG: CPT

## 2017-07-27 ENCOUNTER — HOSPITAL ENCOUNTER (OUTPATIENT)
Dept: CARDIAC REHAB | Age: 82
Discharge: HOME | End: 2017-07-27
Attending: INTERNAL MEDICINE
Payer: MEDICARE

## 2017-07-27 VITALS — BODY MASS INDEX: 28.47 KG/M2 | WEIGHT: 181.8 LBS

## 2017-07-27 PROCEDURE — 93798 PHYS/QHP OP CAR RHAB W/ECG: CPT

## 2017-07-31 ENCOUNTER — HOSPITAL ENCOUNTER (OUTPATIENT)
Dept: CARDIAC REHAB | Age: 82
Discharge: HOME | End: 2017-07-31
Attending: INTERNAL MEDICINE
Payer: MEDICARE

## 2017-07-31 VITALS — WEIGHT: 182.2 LBS | BODY MASS INDEX: 28.54 KG/M2

## 2017-07-31 DIAGNOSIS — Z95.5 STENTED CORONARY ARTERY: ICD-10-CM

## 2017-07-31 PROCEDURE — 93798 PHYS/QHP OP CAR RHAB W/ECG: CPT

## 2017-08-02 ENCOUNTER — HOSPITAL ENCOUNTER (OUTPATIENT)
Dept: CARDIAC REHAB | Age: 82
Discharge: HOME | End: 2017-08-02
Attending: INTERNAL MEDICINE
Payer: MEDICARE

## 2017-08-02 VITALS — WEIGHT: 182.4 LBS | BODY MASS INDEX: 28.57 KG/M2

## 2017-08-02 PROCEDURE — 93798 PHYS/QHP OP CAR RHAB W/ECG: CPT

## 2017-08-04 ENCOUNTER — HOSPITAL ENCOUNTER (OUTPATIENT)
Dept: CARDIAC REHAB | Age: 82
Discharge: HOME | End: 2017-08-04
Attending: INTERNAL MEDICINE
Payer: MEDICARE

## 2017-08-04 VITALS — WEIGHT: 181.2 LBS | BODY MASS INDEX: 28.38 KG/M2

## 2017-08-04 PROCEDURE — 93798 PHYS/QHP OP CAR RHAB W/ECG: CPT

## 2017-08-07 ENCOUNTER — HOSPITAL ENCOUNTER (OUTPATIENT)
Dept: CARDIAC REHAB | Age: 82
Discharge: HOME | End: 2017-08-07
Attending: INTERNAL MEDICINE
Payer: MEDICARE

## 2017-08-07 VITALS — BODY MASS INDEX: 28.46 KG/M2 | WEIGHT: 181.7 LBS

## 2017-08-07 PROCEDURE — 93798 PHYS/QHP OP CAR RHAB W/ECG: CPT

## 2017-08-09 ENCOUNTER — HOSPITAL ENCOUNTER (OUTPATIENT)
Dept: CARDIAC REHAB | Age: 82
Discharge: HOME | End: 2017-08-09
Attending: INTERNAL MEDICINE
Payer: MEDICARE

## 2017-08-09 VITALS — BODY MASS INDEX: 28.32 KG/M2 | WEIGHT: 180.8 LBS

## 2017-08-09 PROCEDURE — 93798 PHYS/QHP OP CAR RHAB W/ECG: CPT

## 2017-08-11 ENCOUNTER — HOSPITAL ENCOUNTER (OUTPATIENT)
Dept: CARDIAC REHAB | Age: 82
Discharge: HOME | End: 2017-08-11
Attending: INTERNAL MEDICINE
Payer: MEDICARE

## 2017-08-11 VITALS — BODY MASS INDEX: 28.29 KG/M2 | WEIGHT: 180.6 LBS

## 2017-08-11 PROCEDURE — 93798 PHYS/QHP OP CAR RHAB W/ECG: CPT

## 2017-08-14 ENCOUNTER — HOSPITAL ENCOUNTER (OUTPATIENT)
Dept: CARDIAC REHAB | Age: 82
Discharge: HOME | End: 2017-08-14
Attending: INTERNAL MEDICINE
Payer: MEDICARE

## 2017-08-14 VITALS — BODY MASS INDEX: 28.47 KG/M2 | WEIGHT: 181.8 LBS

## 2017-08-14 PROCEDURE — 93798 PHYS/QHP OP CAR RHAB W/ECG: CPT

## 2017-08-16 ENCOUNTER — HOSPITAL ENCOUNTER (OUTPATIENT)
Dept: CARDIAC REHAB | Age: 82
Discharge: HOME | End: 2017-08-16
Attending: INTERNAL MEDICINE
Payer: MEDICARE

## 2017-08-16 VITALS — WEIGHT: 181 LBS | BODY MASS INDEX: 28.35 KG/M2

## 2017-08-16 PROCEDURE — 93798 PHYS/QHP OP CAR RHAB W/ECG: CPT

## 2017-08-18 ENCOUNTER — HOSPITAL ENCOUNTER (OUTPATIENT)
Dept: CARDIAC REHAB | Age: 82
Discharge: HOME | End: 2017-08-18
Attending: INTERNAL MEDICINE
Payer: MEDICARE

## 2017-08-18 VITALS — BODY MASS INDEX: 28.63 KG/M2 | WEIGHT: 182.8 LBS

## 2017-08-18 PROCEDURE — 93798 PHYS/QHP OP CAR RHAB W/ECG: CPT

## 2017-08-21 ENCOUNTER — HOSPITAL ENCOUNTER (OUTPATIENT)
Dept: CARDIAC REHAB | Age: 82
Discharge: HOME | End: 2017-08-21
Attending: INTERNAL MEDICINE
Payer: MEDICARE

## 2017-08-21 VITALS — WEIGHT: 181.4 LBS | BODY MASS INDEX: 28.41 KG/M2

## 2017-08-21 PROCEDURE — 93798 PHYS/QHP OP CAR RHAB W/ECG: CPT

## 2017-08-23 ENCOUNTER — HOSPITAL ENCOUNTER (OUTPATIENT)
Dept: CARDIAC REHAB | Age: 82
Discharge: HOME | End: 2017-08-23
Attending: INTERNAL MEDICINE
Payer: MEDICARE

## 2017-08-23 VITALS — WEIGHT: 181.6 LBS | BODY MASS INDEX: 28.44 KG/M2

## 2017-08-23 PROCEDURE — 93798 PHYS/QHP OP CAR RHAB W/ECG: CPT

## 2017-08-25 ENCOUNTER — HOSPITAL ENCOUNTER (OUTPATIENT)
Dept: CARDIAC REHAB | Age: 82
Discharge: HOME | End: 2017-08-25
Attending: INTERNAL MEDICINE
Payer: MEDICARE

## 2017-08-25 VITALS — WEIGHT: 181 LBS | BODY MASS INDEX: 28.35 KG/M2

## 2017-08-25 PROCEDURE — 93798 PHYS/QHP OP CAR RHAB W/ECG: CPT

## 2017-08-28 ENCOUNTER — HOSPITAL ENCOUNTER (OUTPATIENT)
Dept: CARDIAC REHAB | Age: 82
Discharge: HOME | End: 2017-08-28
Attending: INTERNAL MEDICINE
Payer: MEDICARE

## 2017-08-28 VITALS — WEIGHT: 179.8 LBS | BODY MASS INDEX: 28.16 KG/M2

## 2017-08-28 PROCEDURE — 93798 PHYS/QHP OP CAR RHAB W/ECG: CPT

## 2017-08-30 ENCOUNTER — APPOINTMENT (OUTPATIENT)
Dept: CARDIAC REHAB | Age: 82
End: 2017-08-30
Attending: INTERNAL MEDICINE
Payer: MEDICARE

## 2017-08-31 ENCOUNTER — HOSPITAL ENCOUNTER (EMERGENCY)
Age: 82
Discharge: STILL ADMITTED OR ADMINISTRATIVE | End: 2017-08-31
Attending: EMERGENCY MEDICINE

## 2017-09-01 ENCOUNTER — HOSPITAL ENCOUNTER (OUTPATIENT)
Dept: CARDIAC REHAB | Age: 82
Discharge: HOME | End: 2017-09-01
Attending: INTERNAL MEDICINE
Payer: MEDICARE

## 2017-09-01 ENCOUNTER — APPOINTMENT (OUTPATIENT)
Dept: CARDIAC REHAB | Age: 82
End: 2017-09-01
Attending: INTERNAL MEDICINE
Payer: MEDICARE

## 2017-09-06 ENCOUNTER — APPOINTMENT (OUTPATIENT)
Dept: CARDIAC REHAB | Age: 82
End: 2017-09-06
Attending: INTERNAL MEDICINE
Payer: MEDICARE

## 2017-09-06 ENCOUNTER — HOSPITAL ENCOUNTER (OUTPATIENT)
Dept: CARDIAC REHAB | Age: 82
Discharge: HOME | End: 2017-09-06
Attending: INTERNAL MEDICINE
Payer: MEDICARE

## 2017-09-06 VITALS — WEIGHT: 179.8 LBS | BODY MASS INDEX: 28.16 KG/M2

## 2017-09-06 PROCEDURE — 93798 PHYS/QHP OP CAR RHAB W/ECG: CPT

## 2017-09-06 RX ORDER — CLOPIDOGREL BISULFATE 75 MG/1
75 TABLET ORAL DAILY
COMMUNITY

## 2017-09-08 ENCOUNTER — HOSPITAL ENCOUNTER (OUTPATIENT)
Dept: CARDIAC REHAB | Age: 82
Discharge: HOME | End: 2017-09-08
Attending: INTERNAL MEDICINE
Payer: MEDICARE

## 2017-09-08 ENCOUNTER — APPOINTMENT (OUTPATIENT)
Dept: CARDIAC REHAB | Age: 82
End: 2017-09-08
Attending: INTERNAL MEDICINE
Payer: MEDICARE

## 2017-09-08 VITALS — WEIGHT: 181.2 LBS | BODY MASS INDEX: 28.38 KG/M2

## 2017-09-08 PROCEDURE — 93798 PHYS/QHP OP CAR RHAB W/ECG: CPT

## 2017-09-11 ENCOUNTER — HOSPITAL ENCOUNTER (OUTPATIENT)
Dept: CARDIAC REHAB | Age: 82
Discharge: HOME | End: 2017-09-11
Attending: INTERNAL MEDICINE
Payer: MEDICARE

## 2017-09-11 ENCOUNTER — APPOINTMENT (OUTPATIENT)
Dept: CARDIAC REHAB | Age: 82
End: 2017-09-11
Attending: INTERNAL MEDICINE
Payer: MEDICARE

## 2017-09-11 VITALS — BODY MASS INDEX: 28.32 KG/M2 | WEIGHT: 180.8 LBS

## 2017-09-11 PROCEDURE — 93798 PHYS/QHP OP CAR RHAB W/ECG: CPT

## 2017-09-13 ENCOUNTER — HOSPITAL ENCOUNTER (OUTPATIENT)
Dept: CARDIAC REHAB | Age: 82
Discharge: HOME | End: 2017-09-13
Attending: INTERNAL MEDICINE
Payer: MEDICARE

## 2017-09-13 ENCOUNTER — APPOINTMENT (OUTPATIENT)
Dept: CARDIAC REHAB | Age: 82
End: 2017-09-13
Attending: INTERNAL MEDICINE
Payer: MEDICARE

## 2017-09-13 VITALS — BODY MASS INDEX: 28.66 KG/M2 | WEIGHT: 183 LBS

## 2017-09-13 LAB
CREATININE, EXTERNAL: 1.68
LDL-C, EXTERNAL: 65

## 2017-09-13 PROCEDURE — 93798 PHYS/QHP OP CAR RHAB W/ECG: CPT

## 2017-09-15 ENCOUNTER — HOSPITAL ENCOUNTER (OUTPATIENT)
Dept: CARDIAC REHAB | Age: 82
Discharge: HOME | End: 2017-09-15
Attending: INTERNAL MEDICINE
Payer: MEDICARE

## 2017-09-15 ENCOUNTER — APPOINTMENT (OUTPATIENT)
Dept: CARDIAC REHAB | Age: 82
End: 2017-09-15
Attending: INTERNAL MEDICINE
Payer: MEDICARE

## 2017-09-15 VITALS — BODY MASS INDEX: 28.72 KG/M2 | WEIGHT: 183.4 LBS

## 2017-09-15 PROCEDURE — 93798 PHYS/QHP OP CAR RHAB W/ECG: CPT

## 2017-09-18 ENCOUNTER — APPOINTMENT (OUTPATIENT)
Dept: CARDIAC REHAB | Age: 82
End: 2017-09-18
Attending: INTERNAL MEDICINE
Payer: MEDICARE

## 2017-09-20 ENCOUNTER — APPOINTMENT (OUTPATIENT)
Dept: CARDIAC REHAB | Age: 82
End: 2017-09-20
Attending: INTERNAL MEDICINE
Payer: MEDICARE

## 2017-09-21 ENCOUNTER — OFFICE VISIT (OUTPATIENT)
Dept: INTERNAL MEDICINE CLINIC | Age: 82
End: 2017-09-21

## 2017-09-21 VITALS
RESPIRATION RATE: 20 BRPM | WEIGHT: 180 LBS | OXYGEN SATURATION: 96 % | TEMPERATURE: 97.6 F | HEIGHT: 67 IN | SYSTOLIC BLOOD PRESSURE: 132 MMHG | DIASTOLIC BLOOD PRESSURE: 54 MMHG | HEART RATE: 59 BPM | BODY MASS INDEX: 28.25 KG/M2

## 2017-09-21 DIAGNOSIS — N18.2 CRI (CHRONIC RENAL INSUFFICIENCY), STAGE 2 (MILD): ICD-10-CM

## 2017-09-21 DIAGNOSIS — Z23 ENCOUNTER FOR IMMUNIZATION: ICD-10-CM

## 2017-09-21 DIAGNOSIS — R06.00 DYSPNEA, UNSPECIFIED TYPE: Primary | ICD-10-CM

## 2017-09-21 DIAGNOSIS — M17.11 PRIMARY OSTEOARTHRITIS OF RIGHT KNEE: ICD-10-CM

## 2017-09-21 DIAGNOSIS — R80.9 PROTEINURIA, UNSPECIFIED TYPE: ICD-10-CM

## 2017-09-21 DIAGNOSIS — Z95.5 PRESENCE OF STENT IN CORONARY ARTERY IN PATIENT WITH CORONARY ARTERY DISEASE: ICD-10-CM

## 2017-09-21 DIAGNOSIS — I25.10 PRESENCE OF STENT IN CORONARY ARTERY IN PATIENT WITH CORONARY ARTERY DISEASE: ICD-10-CM

## 2017-09-21 DIAGNOSIS — I35.0 AORTIC VALVE STENOSIS, UNSPECIFIED ETIOLOGY: ICD-10-CM

## 2017-09-21 LAB
BILIRUB UR QL STRIP: NEGATIVE
GLUCOSE UR-MCNC: NEGATIVE MG/DL
KETONES P FAST UR STRIP-MCNC: NEGATIVE MG/DL
PH UR STRIP: 5.5 [PH] (ref 4.6–8)
PROT UR QL STRIP: NORMAL MG/DL
SP GR UR STRIP: 1.02 (ref 1–1.03)
UA UROBILINOGEN AMB POC: NORMAL (ref 0.2–1)
URINALYSIS CLARITY POC: CLEAR
URINALYSIS COLOR POC: YELLOW
URINE BLOOD POC: NEGATIVE
URINE LEUKOCYTES POC: NEGATIVE
URINE NITRITES POC: NEGATIVE

## 2017-09-21 NOTE — MR AVS SNAPSHOT
Visit Information Date & Time Provider Department Dept. Phone Encounter #  
 9/21/2017  9:30 AM Destinee Saez  [facility]  [id number] Follow-up Instructions Return in about 4 weeks (around 10/19/2017). Upcoming Health Maintenance Date Due DTaP/Tdap/Td series (1 - Tdap) 9/27/1952 ZOSTER VACCINE AGE 60> 7/27/1991 GLAUCOMA SCREENING Q2Y 9/27/1996 INFLUENZA AGE 9 TO ADULT 8/1/2017 MEDICARE YEARLY EXAM 3/23/2018 Allergies as of 9/21/2017  Review Complete On: 9/21/2017 By: Destinee Saez MD  
 No Known Allergies Current Immunizations  Reviewed on 7/12/2017 Name Date Influenza High Dose Vaccine PF  Incomplete, 10/24/2016 Influenza Vaccine 9/26/2013 Influenza Vaccine (Quad) 9/17/2015  9:45 AM, 10/9/2014 Pneumococcal Conjugate (PCV-13) 6/24/2016 Pneumococcal Polysaccharide (PPSV-23) 12/19/2013 Not reviewed this visit You Were Diagnosed With   
  
 Codes Comments CRI (chronic renal insufficiency), stage 2 (mild)    -  Primary ICD-10-CM: N18.2 ICD-9-CM: 844. 2 Dyspnea, unspecified type     ICD-10-CM: R06.00 
ICD-9-CM: 786.09 Presence of stent in coronary artery in patient with coronary artery disease     ICD-10-CM: I25.10, Z95.5 ICD-9-CM: 414.01, V45.82 Aortic valve stenosis, unspecified etiology     ICD-10-CM: I35.0 ICD-9-CM: 424.1 Primary osteoarthritis of right knee     ICD-10-CM: M17.11 ICD-9-CM: 715.16 Proteinuria, unspecified type     ICD-10-CM: R80.9 ICD-9-CM: 791.0 Encounter for immunization     ICD-10-CM: E31 ICD-9-CM: V03.89 Vitals BP Pulse Temp Resp Height(growth percentile) Weight(growth percentile) 132/54 (BP 1 Location: Left arm, BP Patient Position: At rest) (!) 59 97.6 °F (36.4 °C) (Oral) 20 5' 7\" (1.702 m) 180 lb (81.6 kg) SpO2 BMI Smoking Status 96% 28.19 kg/m2 Former Smoker BMI and BSA Data Body Mass Index Body Surface Area 28.19 kg/m 2 1.96 m 2 Preferred Pharmacy Pharmacy Name Phone [facility], [address] [phone] Your Updated Medication List  
  
   
This list is accurate as of: 9/21/17 10:24 AM.  Always use your most recent med list.  
  
  
  
  
 aspirin delayed-release 81 mg tablet Take  by mouth every morning. atorvastatin 40 mg tablet Commonly known as:  LIPITOR Take 1 Tab by mouth daily. FISH OIL PO Take  by mouth two (2) times a day. ipratropium-albuterol  mcg/actuation inhaler Commonly known as:  Sera Dorrance Take 1 Puff by inhalation every six (6) hours as needed for Wheezing. losartan 100 mg tablet Commonly known as:  COZAAR Take 1 Tab by mouth daily. metoprolol succinate 50 mg XL tablet Commonly known as:  TOPROL-XL Take 50 mg by mouth every evening.  
  
 multivitamin tablet Commonly known as:  ONE A DAY Take 1 Tab by mouth daily (after dinner). NORVASC 5 mg tablet Generic drug:  amLODIPine Take 5 mg by mouth every morning. PLAVIX 75 mg Tab Generic drug:  clopidogrel Take 75 mg by mouth. Prescriptions Sent to Pharmacy Refills  
 ipratropium-albuterol (COMBIVENT RESPIMAT)  mcg/actuation inhaler 1 Sig: Take 1 Puff by inhalation every six (6) hours as needed for Wheezing. Class: Normal  
 Pharmacy: [address], [address] Ph #: [phone] Route: Inhalation We Performed the Following INFLUENZA VIRUS VACCINE, HIGH DOSE SEASONAL, PRESERVATIVE FREE [25567 CPT(R)] Follow-up Instructions Return in about 4 weeks (around 10/19/2017). To-Do List   
 09/25/2017 9:00 AM  
  Appointment with [facility] CARDIOPULM EXERCISE at [facility] ([phone])  
  
 09/27/2017 9:00 AM  
  Appointment with CHRISTI CARDIOPULM EXERCISE at [facility] ([phone]) 09/29/2017 9:00 AM  
  Appointment with MRM CARDIOPULM EXERCISE at [facility] ([phone])  
  
 10/02/2017 9:00 AM  
  Appointment with MRM CARDIOPULM EXERCISE at [facility] ([phone]) 10/04/2017 9:00 AM  
  Appointment with MRM CARDIOPULM EXERCISE at [facility] ([phone])  
  
 10/06/2017 9:00 AM  
  Appointment with MRM CARDIOPULM EXERCISE at [facility] ([phone])  
  
 10/09/2017 9:00 AM  
  Appointment with MRM CARDIOPULM EXERCISE at [facility] ([phone])  
  
 10/11/2017 9:00 AM  
  Appointment with MRM CARDIOPULM EXERCISE at [facility] ([phone])  
  
 10/13/2017 9:00 AM  
  Appointment with MRM CARDIOPULM EXERCISE at [facility] ([phone]) [facility]! Dear Chay Has: Thank you for requesting a [employer] account. Our records indicate that you already have an active [employer] account. You can access your account anytime at [email or web address] Did you know that you can access your hospital and ER discharge instructions at any time in [employer]? You can also review all of your test results from your hospital stay or ER visit. Additional Information If you have questions, please visit the Frequently Asked Questions section of the [employer] website at [email or web address] Remember, [employer] is NOT to be used for urgent needs. For medical emergencies, dial 911. Now available from your iPhone and Android! Please provide this summary of care documentation to your next provider. Your primary care clinician is listed as Florian Barillas. If you have any questions after today's visit, please call [phone].

## 2017-09-21 NOTE — PROGRESS NOTES
Follow up and pain in both knees - eye doctor is Dr Bre Huynh in [address] is eye doctor - due to be seen in January 2018   Brady Downey LPN  9/15/3900  3:81 AM

## 2017-09-22 ENCOUNTER — APPOINTMENT (OUTPATIENT)
Dept: CARDIAC REHAB | Age: 82
End: 2017-09-22
Attending: INTERNAL MEDICINE
Payer: MEDICARE

## 2017-09-24 NOTE — PROGRESS NOTES
Subjective:     Limmie Gilford is a 80 y.o. male who presents for follow up of hypertension. Here with relative. New concerns: more dyspnic lately. No Cp No bleeding. Recently was admitted by cards for CP, had stent placed, SOB since then, BT added plavix. No syncope. Current Outpatient Prescriptions   Medication Sig Dispense Refill    ipratropium-albuterol (COMBIVENT RESPIMAT)  mcg/actuation inhaler Take 1 Puff by inhalation every six (6) hours as needed for Wheezing. 1 Inhaler 1    clopidogrel (PLAVIX) 75 mg tab Take 75 mg by mouth.  atorvastatin (LIPITOR) 40 mg tablet Take 1 Tab by mouth daily. 30 Tab 12    aspirin delayed-release 81 mg tablet Take  by mouth every morning.  metoprolol succinate (TOPROL-XL) 50 mg XL tablet Take 50 mg by mouth every evening.  amLODIPine (NORVASC) 5 mg tablet Take 5 mg by mouth every morning.  losartan (COZAAR) 100 mg tablet Take 1 Tab by mouth daily. (Patient taking differently: Take 100 mg by mouth every morning.) 90 Tab 0    DOCOSAHEXANOIC ACID/EPA (FISH OIL PO) Take  by mouth two (2) times a day.  multivitamin (ONE A DAY) tablet Take 1 Tab by mouth daily (after dinner). 90 Tab 3     No Known Allergies    Diet and Lifestyle: nonsmoker    Cardiovascular ROS: taking medications as instructed, no medication side effects noted, no TIA's, no chest pain on exertion, notes new dyspnea on exertion for last few weeks, no swelling of ankles. Review of Systems, additional:  Pertinent items are noted in HPI.       Patient Active Problem List    Diagnosis Date Noted    History of throat cancer 06/24/2016    ACP (advance care planning) 12/18/2015    Hoarse voice quality 01/09/2015    Primary localized osteoarthrosis, lower leg 08/04/2014    Right knee DJD 08/04/2014    Aortic stenosis 01/03/2014    Hypertension 08/01/2013    Hyperlipidemia 08/01/2013    DDD (degenerative disc disease), lumbar 08/01/2013    BPH (benign prostatic hyperplasia) 08/01/2013     No Known Allergies     Lab Results  Component Value Date/Time   GFR est non-AA >60 06/07/2017 03:32 AM   GFR est AA >60 06/07/2017 03:32 AM   Creatinine 1.13 06/07/2017 03:32 AM   BUN 24 06/07/2017 03:32 AM   Sodium 139 06/07/2017 03:32 AM   Potassium 4.0 06/07/2017 03:32 AM   Chloride 109 06/07/2017 03:32 AM   CO2 22 06/07/2017 03:32 AM     Lab Results   Component Value Date/Time    Glucose 91 06/07/2017 03:32 AM    Glucose (POC) 116 08/05/2014 08:59 PM                 Objective:     Physical exam significant for the following: elderly       Visit Vitals    /54 (BP 1 Location: Left arm, BP Patient Position: At rest)    Pulse (!) 59    Temp 97.6 °F (36.4 °C) (Oral)    Resp 20    Ht 5' 7\" (1.702 m)    Wt 180 lb (81.6 kg)    SpO2 96%    BMI 28.19 kg/m2     Appearance: alert, well appearing, and in no distress. General exam: CVS exam BP noted to be well controlled today in office, S1, S2 normal, no gallop, 3/6 sytolic murmur, chest clear, no JVD, no HSM, no edema. .   Assessment/Plan:     hypertension stable, hyperlipidemia stable, coronary artery disease stable. ICD-10-CM ICD-9-CM    1. CRI (chronic renal insufficiency), stage 2 (mild) N18.2 585.2 AMB POC URINALYSIS DIP STICK AUTO W/O MICRO   2. Dyspnea, unspecified type R06.00 786.09 AMB POC URINALYSIS DIP STICK AUTO W/O MICRO   3. Presence of stent in coronary artery in patient with coronary artery disease I25.10 414.01 AMB POC URINALYSIS DIP STICK AUTO W/O MICRO    Z95.5 V45.82    4. Aortic valve stenosis, unspecified etiology I35.0 424.1 AMB POC URINALYSIS DIP STICK AUTO W/O MICRO   5. Primary osteoarthritis of right knee M17.11 715.16 AMB POC URINALYSIS DIP STICK AUTO W/O MICRO   6. Proteinuria, unspecified type R80.9 791.0 AMB POC URINALYSIS DIP STICK AUTO W/O MICRO   7.  Encounter for immunization Z23 V03.89 INFLUENZA VIRUS VACCINE, HIGH DOSE SEASONAL, PRESERVATIVE FREE      ADMIN INFLUENZA VIRUS VAC      AMB POC URINALYSIS DIP STICK AUTO W/O MICRO     Need records from his cards. Add combivent to see if breathing improves. May get spirometry when he f/u    Orders Placed This Encounter    Influenza virus vaccine (FLUZONE HIGH-DOSE) 65 years and older (12242)    AMB POC URINALYSIS DIP STICK AUTO W/O MICRO    ADMIN INFLUENZA VIRUS VAC    ipratropium-albuterol (COMBIVENT RESPIMAT)  mcg/actuation inhaler     Sig: Take 1 Puff by inhalation every six (6) hours as needed for Wheezing. Dispense:  1 Inhaler     Refill:  1     Chronic Conditions Addressed Today     1. Right knee DJD     Relevant Orders     AMB POC URINALYSIS DIP STICK AUTO W/O MICRO (Completed)    2. Aortic stenosis     Relevant Orders     AMB POC URINALYSIS DIP STICK AUTO W/O MICRO (Completed)      Acute Diagnoses Addressed Today     Dyspnea, unspecified type    -  Primary        Relevant Medications        ipratropium-albuterol (COMBIVENT RESPIMAT)  mcg/actuation inhaler        Other Relevant Orders        AMB POC URINALYSIS DIP STICK AUTO W/O MICRO (Completed)    CRI (chronic renal insufficiency), stage 2 (mild)            Relevant Orders        AMB POC URINALYSIS DIP STICK AUTO W/O MICRO (Completed)    Presence of stent in coronary artery in patient with coronary artery disease            Relevant Orders        AMB POC URINALYSIS DIP STICK AUTO W/O MICRO (Completed)    Proteinuria, unspecified type            Relevant Orders        AMB POC URINALYSIS DIP STICK AUTO W/O MICRO (Completed)    Encounter for immunization            Relevant Orders        INFLUENZA VIRUS VACCINE, HIGH DOSE SEASONAL, PRESERVATIVE FREE (Completed)        ADMIN INFLUENZA VIRUS VAC        AMB POC URINALYSIS DIP STICK AUTO W/O MICRO (Completed)          Follow-up Disposition:  Return in about 4 weeks (around 10/19/2017).

## 2017-09-25 ENCOUNTER — HOSPITAL ENCOUNTER (OUTPATIENT)
Dept: CARDIAC REHAB | Age: 82
Discharge: HOME | End: 2017-09-25
Attending: INTERNAL MEDICINE
Payer: MEDICARE

## 2017-09-25 ENCOUNTER — APPOINTMENT (OUTPATIENT)
Dept: CARDIAC REHAB | Age: 82
End: 2017-09-25
Attending: INTERNAL MEDICINE
Payer: MEDICARE

## 2017-09-25 VITALS — WEIGHT: 181.4 LBS | BODY MASS INDEX: 28.41 KG/M2

## 2017-09-25 PROCEDURE — 93798 PHYS/QHP OP CAR RHAB W/ECG: CPT

## 2017-09-27 ENCOUNTER — APPOINTMENT (OUTPATIENT)
Dept: CARDIAC REHAB | Age: 82
End: 2017-09-27
Attending: INTERNAL MEDICINE
Payer: MEDICARE

## 2017-09-27 ENCOUNTER — HOSPITAL ENCOUNTER (OUTPATIENT)
Dept: CARDIAC REHAB | Age: 82
Discharge: HOME | End: 2017-09-27
Attending: INTERNAL MEDICINE
Payer: MEDICARE

## 2017-09-27 VITALS — BODY MASS INDEX: 28.47 KG/M2 | WEIGHT: 181.8 LBS

## 2017-09-27 PROCEDURE — 93798 PHYS/QHP OP CAR RHAB W/ECG: CPT

## 2017-09-29 ENCOUNTER — APPOINTMENT (OUTPATIENT)
Dept: CARDIAC REHAB | Age: 82
End: 2017-09-29
Attending: INTERNAL MEDICINE
Payer: MEDICARE

## 2017-09-29 ENCOUNTER — HOSPITAL ENCOUNTER (OUTPATIENT)
Dept: CARDIAC REHAB | Age: 82
Discharge: HOME | End: 2017-09-29
Attending: INTERNAL MEDICINE
Payer: MEDICARE

## 2017-09-29 VITALS — WEIGHT: 181.6 LBS | BODY MASS INDEX: 28.44 KG/M2

## 2017-09-29 PROCEDURE — 93798 PHYS/QHP OP CAR RHAB W/ECG: CPT

## 2017-10-02 ENCOUNTER — HOSPITAL ENCOUNTER (OUTPATIENT)
Dept: CARDIAC REHAB | Age: 82
Discharge: HOME | End: 2017-10-02
Attending: INTERNAL MEDICINE
Payer: MEDICARE

## 2017-10-02 ENCOUNTER — APPOINTMENT (OUTPATIENT)
Dept: CARDIAC REHAB | Age: 82
End: 2017-10-02
Attending: INTERNAL MEDICINE
Payer: MEDICARE

## 2017-10-02 VITALS — WEIGHT: 182.4 LBS | BODY MASS INDEX: 28.57 KG/M2

## 2017-10-02 PROCEDURE — 93798 PHYS/QHP OP CAR RHAB W/ECG: CPT

## 2017-10-04 ENCOUNTER — APPOINTMENT (OUTPATIENT)
Dept: CARDIAC REHAB | Age: 82
End: 2017-10-04
Attending: INTERNAL MEDICINE
Payer: MEDICARE

## 2017-10-06 ENCOUNTER — APPOINTMENT (OUTPATIENT)
Dept: CARDIAC REHAB | Age: 82
End: 2017-10-06
Attending: INTERNAL MEDICINE
Payer: MEDICARE

## 2017-10-06 ENCOUNTER — HOSPITAL ENCOUNTER (OUTPATIENT)
Dept: CARDIAC REHAB | Age: 82
Discharge: HOME | End: 2017-10-06
Attending: INTERNAL MEDICINE
Payer: MEDICARE

## 2017-10-06 VITALS — WEIGHT: 181.6 LBS | BODY MASS INDEX: 28.44 KG/M2

## 2017-10-06 PROCEDURE — 93798 PHYS/QHP OP CAR RHAB W/ECG: CPT

## 2017-10-09 ENCOUNTER — HOSPITAL ENCOUNTER (OUTPATIENT)
Dept: CARDIAC REHAB | Age: 82
Discharge: HOME | End: 2017-10-09
Attending: INTERNAL MEDICINE
Payer: MEDICARE

## 2017-10-09 VITALS — BODY MASS INDEX: 28.72 KG/M2 | WEIGHT: 183.4 LBS

## 2017-10-09 PROCEDURE — 93798 PHYS/QHP OP CAR RHAB W/ECG: CPT

## 2017-10-11 ENCOUNTER — HOSPITAL ENCOUNTER (OUTPATIENT)
Dept: CARDIAC REHAB | Age: 82
Discharge: HOME | End: 2017-10-11
Attending: INTERNAL MEDICINE
Payer: MEDICARE

## 2017-10-11 VITALS — WEIGHT: 184.4 LBS | BODY MASS INDEX: 28.88 KG/M2

## 2017-10-11 PROCEDURE — 93798 PHYS/QHP OP CAR RHAB W/ECG: CPT

## 2017-10-13 ENCOUNTER — HOSPITAL ENCOUNTER (OUTPATIENT)
Dept: CARDIAC REHAB | Age: 82
Discharge: HOME | End: 2017-10-13
Attending: INTERNAL MEDICINE
Payer: MEDICARE

## 2017-10-13 VITALS — BODY MASS INDEX: 28.66 KG/M2 | WEIGHT: 183 LBS

## 2017-10-13 PROCEDURE — 93798 PHYS/QHP OP CAR RHAB W/ECG: CPT

## 2017-10-16 ENCOUNTER — HOSPITAL ENCOUNTER (OUTPATIENT)
Dept: CARDIAC REHAB | Age: 82
Discharge: HOME | End: 2017-10-16
Attending: INTERNAL MEDICINE
Payer: MEDICARE

## 2017-10-16 VITALS — BODY MASS INDEX: 28.66 KG/M2 | WEIGHT: 183 LBS

## 2017-10-16 PROCEDURE — 93798 PHYS/QHP OP CAR RHAB W/ECG: CPT

## 2017-10-18 ENCOUNTER — HOSPITAL ENCOUNTER (OUTPATIENT)
Dept: CARDIAC REHAB | Age: 82
Discharge: HOME | End: 2017-10-18
Payer: MEDICARE

## 2017-10-18 VITALS — WEIGHT: 182 LBS | BODY MASS INDEX: 28.51 KG/M2

## 2017-10-18 PROCEDURE — 93798 PHYS/QHP OP CAR RHAB W/ECG: CPT

## 2017-10-20 ENCOUNTER — HOSPITAL ENCOUNTER (OUTPATIENT)
Dept: CARDIAC REHAB | Age: 82
Discharge: HOME | End: 2017-10-20
Payer: MEDICARE

## 2017-10-20 VITALS — BODY MASS INDEX: 28.44 KG/M2 | WEIGHT: 181.6 LBS

## 2017-10-20 PROCEDURE — 93798 PHYS/QHP OP CAR RHAB W/ECG: CPT

## 2017-10-23 ENCOUNTER — HOSPITAL ENCOUNTER (OUTPATIENT)
Dept: CARDIAC REHAB | Age: 82
Discharge: HOME | End: 2017-10-23
Payer: MEDICARE

## 2017-10-23 VITALS — WEIGHT: 181 LBS | BODY MASS INDEX: 28.35 KG/M2

## 2017-10-23 PROCEDURE — 93798 PHYS/QHP OP CAR RHAB W/ECG: CPT

## 2017-10-30 ENCOUNTER — TELEPHONE (OUTPATIENT)
Dept: INTERNAL MEDICINE CLINIC | Age: 82
End: 2017-10-30

## 2017-10-30 NOTE — TELEPHONE ENCOUNTER
Returned call to let her know labs can be done here or [facility].  Dr. Karyle Balding will call or send letter to pt.

## 2017-10-30 NOTE — TELEPHONE ENCOUNTER
Giovani Styles, called in reference to wanting to know if pt needed labs done before his appt. Please call her at [phone].

## 2017-11-02 ENCOUNTER — OFFICE VISIT (OUTPATIENT)
Dept: INTERNAL MEDICINE CLINIC | Age: 82
End: 2017-11-02

## 2017-11-02 VITALS
BODY MASS INDEX: 28.56 KG/M2 | SYSTOLIC BLOOD PRESSURE: 136 MMHG | DIASTOLIC BLOOD PRESSURE: 54 MMHG | HEIGHT: 67 IN | RESPIRATION RATE: 20 BRPM | TEMPERATURE: 98 F | WEIGHT: 182 LBS | OXYGEN SATURATION: 96 % | HEART RATE: 51 BPM

## 2017-11-02 DIAGNOSIS — I25.10 PRESENCE OF STENT IN CORONARY ARTERY IN PATIENT WITH CORONARY ARTERY DISEASE: Primary | ICD-10-CM

## 2017-11-02 DIAGNOSIS — I35.0 AORTIC VALVE STENOSIS, ETIOLOGY OF CARDIAC VALVE DISEASE UNSPECIFIED: ICD-10-CM

## 2017-11-02 DIAGNOSIS — I10 ESSENTIAL HYPERTENSION: ICD-10-CM

## 2017-11-02 DIAGNOSIS — M17.11 PRIMARY OSTEOARTHRITIS OF RIGHT KNEE: ICD-10-CM

## 2017-11-02 DIAGNOSIS — Z95.5 PRESENCE OF STENT IN CORONARY ARTERY IN PATIENT WITH CORONARY ARTERY DISEASE: Primary | ICD-10-CM

## 2017-11-02 RX ORDER — ALBUTEROL SULFATE 90 UG/1
1 AEROSOL, METERED RESPIRATORY (INHALATION)
Qty: 1 INHALER | Refills: 5 | Status: SHIPPED | OUTPATIENT
Start: 2017-11-02 | End: 2018-08-01

## 2017-11-02 NOTE — LETTER
11/6/2017 7:48 AM 
 
Mr. Pako Castellanos [address] [facility] [address] Dear Pako Castellanos: 
 
Please find your most recent results below. Resulted Orders METABOLIC PANEL, BASIC Result Value Ref Range Glucose 94 65 - 99 mg/dL BUN 30 (H) 8 - 27 mg/dL Creatinine 1.34 (H) 0.76 - 1.27 mg/dL GFR est non-AA 48 (L) >59 mL/min/1.73 GFR est AA 55 (L) >59 mL/min/1.73  
 BUN/Creatinine ratio 22 10 - 24 Sodium 140 134 - 144 mmol/L Potassium 4.7 3.5 - 5.2 mmol/L Chloride 103 96 - 106 mmol/L  
 CO2 21 18 - 29 mmol/L Calcium 9.3 8.6 - 10.2 mg/dL Narrative Performed at:  [address]  [id number] : Drew Chin MD, Phone:  [phone] CBC W/O DIFF Result Value Ref Range WBC 5.1 3.4 - 10.8 x10E3/uL  
 RBC 4.06 (L) 4.14 - 5.80 x10E6/uL HGB 12.5 (L) 12.6 - 17.7 g/dL HCT 38.5 37.5 - 51.0 % MCV 95 79 - 97 fL  
 MCH 30.8 26.6 - 33.0 pg  
 MCHC 32.5 31.5 - 35.7 g/dL  
 RDW 13.9 12.3 - 15.4 % PLATELET 505 908 - 345 x10E3/uL Narrative Performed at:  [address]  [id number] : Drew Chin MD, Phone:  [phone] CKD REPORT Result Value Ref Range Interpretation Note Comment:  
   Supplemental report is available. Narrative Performed at:  [address] [place] [address]  [id number] : Tomas Wasserman PhD, Phone:  [phone] RECOMMENDATIONS: 
The results of your most recent labs are normal/ stable. Please follow up as scheduled. You should consider signing up for our online portal so that you can review your results online and have access online to our practice for messages. We can help you sign up if you havent already. Please call me if you have any questions: [phone] Sincerely, Jamal Fletcher MD

## 2017-11-02 NOTE — PROGRESS NOTES
Subjective:     Kaye Tran is a 80 y.o. male who presents for follow up of hypertension, hyperlipidemia, coronary artery disease and status post coronary artery stenting. New concerns: dyspnea has improved greatly with stent and rehab, min c/o SOB. Inhaler was too expensive. Here with daughter and son in law. She is c/o about his kidneys but Cr have been pretty nl. He has had minimal wheezing. Some right knee pain, might need an operation but ortho wants him to finish plavix before can do. Stent placed June 2017, no issues with bleeding or urination. No syncope. No Known Allergies    Diet and Lifestyle: nonsmoker    Cardiovascular ROS: taking medications as instructed, no medication side effects noted, no TIA's, no chest pain on exertion, no dyspnea on exertion, no swelling of ankles. Review of Systems, additional:  Pertinent items are noted in HPI. 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 1 Inhaler 5    clopidogrel (PLAVIX) 75 mg tab Take 75 mg by mouth.  atorvastatin (LIPITOR) 40 mg tablet Take 1 Tab by mouth daily. 30 Tab 12    aspirin delayed-release 81 mg tablet Take  by mouth every morning.  metoprolol succinate (TOPROL-XL) 50 mg XL tablet Take 50 mg by mouth every evening.       amLODIPine (NORVASC) 5 mg tablet Take 5 mg by mouth every morning.  losartan (COZAAR) 100 mg tablet Take 1 Tab by mouth daily. (Patient taking differently: Take 100 mg by mouth every morning.) 90 Tab 0    DOCOSAHEXANOIC ACID/EPA (FISH OIL PO) Take  by mouth two (2) times a day.  multivitamin (ONE A DAY) tablet Take 1 Tab by mouth daily (after dinner). 90 Tab 3     No Known Allergies  Social History   Substance Use Topics    Smoking status: Former Smoker     Packs/day: 1.00     Years: 26.00     Types: Cigarettes     Quit date: 1/1/1976    Smokeless tobacco: Never Used    Alcohol use 0.6 oz/week     1 Shots of liquor, 0 Standard drinks or equivalent per week      Comment: 4-5x week burbon in afternoon        Lab Results  Component Value Date/Time   WBC 5.1 11/02/2017 12:00 PM   HGB 12.5 11/02/2017 12:00 PM   HCT 38.5 11/02/2017 12:00 PM   PLATELET 404 54/60/4243 12:00 PM   MCV 95 11/02/2017 12:00 PM     Lab Results  Component Value Date/Time   ALT (SGPT) 22 09/17/2015 09:49 AM   AST (SGOT) 26 09/17/2015 09:49 AM   Alk.  phosphatase 103 09/17/2015 09:49 AM   Bilirubin, total 0.3 09/17/2015 09:49 AM   Albumin 4.3 09/17/2015 09:49 AM   Protein, total 6.7 09/17/2015 09:49 AM   INR 1.0 07/24/2014 04:00 PM   Prothrombin time 10.0 07/24/2014 04:00 PM   PLATELET 302 73/45/6497 12:00 PM       Lab Results  Component Value Date/Time   GFR est non-AA 48 11/02/2017 12:00 PM   GFR est AA 55 11/02/2017 12:00 PM   Creatinine 1.34 11/02/2017 12:00 PM   BUN 30 11/02/2017 12:00 PM   Sodium 140 11/02/2017 12:00 PM   Potassium 4.7 11/02/2017 12:00 PM   Chloride 103 11/02/2017 12:00 PM   CO2 21 11/02/2017 12:00 PM     Lab Results   Component Value Date/Time    Glucose 94 11/02/2017 12:00 PM    Glucose (POC) 116 08/05/2014 08:59 PM                       Objective:     Physical exam significant for the following: elderly NAD   Visit Vitals    /54 (BP 1 Location: Left arm, BP Patient Position: Sitting)    Pulse (!) 51    Temp 98 °F (36.7 °C) (Oral)    Resp 20    Ht 5' 7\" (1.702 m)    Wt 182 lb (82.6 kg)    SpO2 96%    BMI 28.51 kg/m2     Appearance: alert, well appearing, and in no distress. General exam: CVS exam BP noted to be well controlled today in office, S1, S2 normal, no gallop, no murmur, chest clear, no JVD, no HSM, no edema. .   Assessment/Plan:     hypertension stable, coronary artery disease stable. ICD-10-CM ICD-9-CM    1. Presence of stent in coronary artery in patient with coronary artery disease I25.10 414.01 DE HANDLG&/OR CONVEY OF SPEC FOR TR OFFICE TO LAB    Z95.5 V45.82 DE COLLECTION VENOUS BLOOD,VENIPUNCTURE   2. Primary osteoarthritis of right knee M17.11 715.16 DE HANDLG&/OR CONVEY OF SPEC FOR TR OFFICE TO LAB      DE COLLECTION VENOUS BLOOD,VENIPUNCTURE   3. Essential hypertension G72 404.0 METABOLIC PANEL, BASIC      CBC W/O DIFF      DE HANDLG&/OR CONVEY OF SPEC FOR TR OFFICE TO LAB      DE COLLECTION VENOUS BLOOD,VENIPUNCTURE   4. Aortic valve stenosis, etiology of cardiac valve disease unspecified I35.0 424.1 DE HANDLG&/OR CONVEY OF SPEC FOR TR OFFICE TO LAB      DE COLLECTION VENOUS BLOOD,VENIPUNCTURE     Orders Placed This Encounter    METABOLIC PANEL, BASIC    CBC W/O DIFF    CKD REPORT    DE HANDLG&/OR CONVEY OF SPEC FOR TR OFFICE TO LAB    DE COLLECTION VENOUS BLOOD,VENIPUNCTURE    albuterol (PROVENTIL HFA, VENTOLIN HFA, PROAIR HFA) 90 mcg/actuation inhaler     Sig: Take 1 Puff by inhalation every six (6) hours as needed for Wheezing. Dispense:  1 Inhaler     Refill:  5     For mild asthma use the above inhaler as needed should be relatively inexpensive. Labs to monitor kidney functions as above. Coronary artery disease and aortic valve disease stable, continue blood thinner. Knee arthritis, wait until he is off Plavix. Follow-up Disposition:  Return in about 3 months (around 2/2/2018) for routine follow up.

## 2017-11-02 NOTE — MR AVS SNAPSHOT
Visit Information Date & Time Provider Department Dept. Phone Encounter #  
 11/2/2017 10:45 AM Suad Lauren  Amende  [id number] Follow-up Instructions Return in about 3 months (around 2/2/2018) for routine follow up. Upcoming Health Maintenance Date Due DTaP/Tdap/Td series (1 - Tdap) 9/27/1952 ZOSTER VACCINE AGE 60> 7/27/1991 GLAUCOMA SCREENING Q2Y 9/27/1996 MEDICARE YEARLY EXAM 3/23/2018 Allergies as of 11/2/2017  Review Complete On: 11/2/2017 By: Suad Luaren MD  
 No Known Allergies Current Immunizations  Reviewed on 9/21/2017 Name Date Influenza High Dose Vaccine PF 9/21/2017, 10/24/2016 Influenza Vaccine 9/26/2013 Influenza Vaccine (Quad) 9/17/2015  9:45 AM, 10/9/2014 Pneumococcal Conjugate (PCV-13) 6/24/2016 Pneumococcal Polysaccharide (PPSV-23) 12/19/2013 Not reviewed this visit You Were Diagnosed With   
  
 Codes Comments Presence of stent in coronary artery in patient with coronary artery disease    -  Primary ICD-10-CM: I25.10, Z95.5 ICD-9-CM: 414.01, V45.82 Primary osteoarthritis of right knee     ICD-10-CM: M17.11 ICD-9-CM: 715.16 Essential hypertension     ICD-10-CM: I10 
ICD-9-CM: 401.9 Aortic valve stenosis, etiology of cardiac valve disease unspecified     ICD-10-CM: I35.0 ICD-9-CM: 424.1 Vitals BP Pulse Temp Resp Height(growth percentile) Weight(growth percentile) 136/54 (BP 1 Location: Left arm, BP Patient Position: Sitting) (!) 51 98 °F (36.7 °C) (Oral) 20 5' 7\" (1.702 m) 182 lb (82.6 kg) SpO2 BMI Smoking Status 96% 28.51 kg/m2 Former Smoker Vitals History BMI and BSA Data Body Mass Index Body Surface Area 28.51 kg/m 2 1.98 m 2 Preferred Pharmacy Pharmacy Name Phone [facility] [address] [facility], [address] [facility] [phone] Your Updated Medication List  
  
   
 This list is accurate as of: 11/2/17 12:09 PM.  Always use your most recent med list.  
  
  
  
  
 albuterol 90 mcg/actuation inhaler Commonly known as:  PROVENTIL HFA, VENTOLIN HFA, PROAIR HFA Take 1 Puff by inhalation every six (6) hours as needed for Wheezing. aspirin delayed-release 81 mg tablet Take  by mouth every morning. atorvastatin 40 mg tablet Commonly known as:  LIPITOR Take 1 Tab by mouth daily. FISH OIL PO Take  by mouth two (2) times a day. losartan 100 mg tablet Commonly known as:  COZAAR Take 1 Tab by mouth daily. metoprolol succinate 50 mg XL tablet Commonly known as:  TOPROL-XL Take 50 mg by mouth every evening.  
  
 multivitamin tablet Commonly known as:  ONE A DAY Take 1 Tab by mouth daily (after dinner). NORVASC 5 mg tablet Generic drug:  amLODIPine Take 5 mg by mouth every morning. PLAVIX 75 mg Tab Generic drug:  clopidogrel Take 75 mg by mouth. Prescriptions Sent to Pharmacy Refills  
 albuterol (PROVENTIL HFA, VENTOLIN HFA, PROAIR HFA) 90 mcg/actuation inhaler 5 Sig: Take 1 Puff by inhalation every six (6) hours as needed for Wheezing. Class: Normal  
 Pharmacy: [id number] #: [phone] Route: Inhalation We Performed the Following CBC W/O DIFF [78521 CPT(R)] METABOLIC PANEL, BASIC [05599 CPT(R)] Follow-up Instructions Return in about 3 months (around 2/2/2018) for routine follow up. Introducing [facility] & HEALTH SERVICES! Deatacho Ibrahim: Thank you for requesting a [employer] account. Our records indicate that you already have an active [employer] account. You can access your account anytime at [email or web address] Did you know that you can access your hospital and ER discharge instructions at any time in [employer]?   You can also review all of your test results from your hospital stay or ER visit. Additional Information If you have questions, please visit the Frequently Asked Questions section of the [employer] website at [email or web address] Remember, [employer] is NOT to be used for urgent needs. For medical emergencies, dial 911. Now available from your iPhone and Android! Please provide this summary of care documentation to your next provider. Your primary care clinician is listed as Zackary Farris. If you have any questions after today's visit, please call [phone].

## 2017-11-02 NOTE — PROGRESS NOTES
Chief Complaint   Patient presents with    Breathing Problem     patient is having shortness of breath

## 2017-11-03 PROBLEM — J45.909 MILD ASTHMA: Status: ACTIVE | Noted: 2017-11-03

## 2017-11-03 LAB
BUN SERPL-MCNC: 30 MG/DL (ref 8–27)
BUN/CREAT SERPL: 22 (ref 10–24)
CALCIUM SERPL-MCNC: 9.3 MG/DL (ref 8.6–10.2)
CHLORIDE SERPL-SCNC: 103 MMOL/L (ref 96–106)
CO2 SERPL-SCNC: 21 MMOL/L (ref 18–29)
CREAT SERPL-MCNC: 1.34 MG/DL (ref 0.76–1.27)
ERYTHROCYTE [DISTWIDTH] IN BLOOD BY AUTOMATED COUNT: 13.9 % (ref 12.3–15.4)
GFR SERPLBLD CREATININE-BSD FMLA CKD-EPI: 48 ML/MIN/1.73
GFR SERPLBLD CREATININE-BSD FMLA CKD-EPI: 55 ML/MIN/1.73
GLUCOSE SERPL-MCNC: 94 MG/DL (ref 65–99)
HCT VFR BLD AUTO: 38.5 % (ref 37.5–51)
HGB BLD-MCNC: 12.5 G/DL (ref 12.6–17.7)
INTERPRETATION: NORMAL
MCH RBC QN AUTO: 30.8 PG (ref 26.6–33)
MCHC RBC AUTO-ENTMCNC: 32.5 G/DL (ref 31.5–35.7)
MCV RBC AUTO: 95 FL (ref 79–97)
PLATELET # BLD AUTO: 211 X10E3/UL (ref 150–379)
POTASSIUM SERPL-SCNC: 4.7 MMOL/L (ref 3.5–5.2)
RBC # BLD AUTO: 4.06 X10E6/UL (ref 4.14–5.8)
SODIUM SERPL-SCNC: 140 MMOL/L (ref 134–144)
WBC # BLD AUTO: 5.1 X10E3/UL (ref 3.4–10.8)

## 2017-11-03 NOTE — PROGRESS NOTES
Send normal/stable results letter. Your results are normal/stable. If not signed up, consider getting my chart to get your results on-line. We can help you to sign up.

## 2018-03-23 ENCOUNTER — DOCUMENTATION ONLY (OUTPATIENT)
Dept: INTERNAL MEDICINE CLINIC | Age: 83
End: 2018-03-23

## 2018-03-23 NOTE — PROGRESS NOTES
Pt called stated that he was returning a call from the office, adv that it was the automated system, stated that he didn't want an appt at this time

## 2018-05-01 ENCOUNTER — OFFICE VISIT (OUTPATIENT)
Dept: INTERNAL MEDICINE CLINIC | Age: 83
End: 2018-05-01

## 2018-05-01 VITALS
DIASTOLIC BLOOD PRESSURE: 73 MMHG | HEART RATE: 68 BPM | WEIGHT: 188 LBS | BODY MASS INDEX: 29.51 KG/M2 | SYSTOLIC BLOOD PRESSURE: 146 MMHG | OXYGEN SATURATION: 95 % | RESPIRATION RATE: 18 BRPM | TEMPERATURE: 98.1 F | HEIGHT: 67 IN

## 2018-05-01 DIAGNOSIS — Z95.5 PRESENCE OF STENT IN CORONARY ARTERY IN PATIENT WITH CORONARY ARTERY DISEASE: ICD-10-CM

## 2018-05-01 DIAGNOSIS — Z13.39 SCREENING FOR ALCOHOLISM: ICD-10-CM

## 2018-05-01 DIAGNOSIS — Z13.31 SCREENING FOR DEPRESSION: ICD-10-CM

## 2018-05-01 DIAGNOSIS — I10 ESSENTIAL HYPERTENSION: ICD-10-CM

## 2018-05-01 DIAGNOSIS — L02.12 BOIL, NECK: ICD-10-CM

## 2018-05-01 DIAGNOSIS — Z00.00 MEDICARE ANNUAL WELLNESS VISIT, SUBSEQUENT: Primary | ICD-10-CM

## 2018-05-01 DIAGNOSIS — E78.5 HYPERLIPIDEMIA, UNSPECIFIED HYPERLIPIDEMIA TYPE: ICD-10-CM

## 2018-05-01 DIAGNOSIS — J45.20 MILD INTERMITTENT ASTHMA WITHOUT COMPLICATION: ICD-10-CM

## 2018-05-01 DIAGNOSIS — I25.10 PRESENCE OF STENT IN CORONARY ARTERY IN PATIENT WITH CORONARY ARTERY DISEASE: ICD-10-CM

## 2018-05-01 RX ORDER — MUPIROCIN 20 MG/G
OINTMENT TOPICAL 2 TIMES DAILY
Qty: 22 G | Refills: 0 | Status: SHIPPED | OUTPATIENT
Start: 2018-05-01 | End: 2018-05-08

## 2018-05-01 NOTE — MR AVS SNAPSHOT
[address] [address] [facility] 
[phone] Patient: Jose Pizarro MRN: [id number] YJP:[birth year] Visit Information Date & Time Provider Department Dept. Phone Encounter #  
 5/1/2018  9:30 AM Zoe Stein MD [place] [id number]  Follow-up Instructions Return in about 2 days (around 5/3/2018) for routine follow up. Upcoming Health Maintenance Date Due DTaP/Tdap/Td series (1 - Tdap) 9/27/1952 ZOSTER VACCINE AGE 60> 7/27/1991 GLAUCOMA SCREENING Q2Y 9/27/1996 MEDICARE YEARLY EXAM 3/23/2018 Influenza Age 5 to Adult 8/1/2018 Allergies as of 5/1/2018  Review Complete On: 5/1/2018 By: Zoe Stein MD  
 No Known Allergies Current Immunizations  Reviewed on 9/21/2017 Name Date Influenza High Dose Vaccine PF 9/21/2017, 10/24/2016 Influenza Vaccine 9/26/2013 Influenza Vaccine (Quad) 9/17/2015  9:45 AM, 10/9/2014 Pneumococcal Conjugate (PCV-13) 6/24/2016 Pneumococcal Polysaccharide (PPSV-23) 12/19/2013 Not reviewed this visit You Were Diagnosed With   
  
 Codes Comments Medicare annual wellness visit, subsequent    -  Primary ICD-10-CM: Z00.00 ICD-9-CM: V70.0 Essential hypertension     ICD-10-CM: I10 
ICD-9-CM: 401.9 Hyperlipidemia, unspecified hyperlipidemia type     ICD-10-CM: E78.5 ICD-9-CM: 272.4 Presence of stent in coronary artery in patient with coronary artery disease     ICD-10-CM: I25.10, Z95.5 ICD-9-CM: 414.01, V45.82 Mild intermittent asthma without complication     MZR-07-HX: J45.20 ICD-9-CM: 493.90 Screening for alcoholism     ICD-10-CM: Z13.89 ICD-9-CM: V79.1 Screening for depression     ICD-10-CM: Z13.89 ICD-9-CM: V79.0 Boil, neck     ICD-10-CM: L02.12 
ICD-9-CM: 680.1 Vitals BP Pulse Temp Resp Height(growth percentile) Weight(growth percentile) 146/73 (BP 1 Location: Left arm, BP Patient Position: Sitting) 68 98.1 °F (36.7 °C) (Oral) 18 5' 7\" (1.702 m) 188 lb (85.3 kg) SpO2 BMI Smoking Status 95% 29.44 kg/m2 Former Smoker BMI and BSA Data Body Mass Index Body Surface Area  
 29.44 kg/m 2 2.01 m 2 Preferred Pharmacy Pharmacy Name Phone [facility] [address] [facility], [address] [facility] [phone] Your Updated Medication List  
  
   
This list is accurate as of 5/1/18 11:00 AM.  Always use your most recent med list.  
  
  
  
  
 albuterol 90 mcg/actuation inhaler Commonly known as:  PROVENTIL HFA, VENTOLIN HFA, PROAIR HFA Take 1 Puff by inhalation every six (6) hours as needed for Wheezing. aspirin delayed-release 81 mg tablet Take  by mouth every morning. atorvastatin 40 mg tablet Commonly known as:  LIPITOR Take 1 Tab by mouth daily. FISH OIL PO Take  by mouth two (2) times a day. losartan 100 mg tablet Commonly known as:  COZAAR Take 1 Tab by mouth daily. metoprolol succinate 50 mg XL tablet Commonly known as:  TOPROL-XL Take 50 mg by mouth every evening.  
  
 multivitamin tablet Commonly known as:  ONE A DAY Take 1 Tab by mouth daily (after dinner). mupirocin 2 % ointment Commonly known as:  [employer] Apply  to affected area two (2) times a day for 7 days. NORVASC 5 mg tablet Generic drug:  amLODIPine Take 5 mg by mouth every morning. PLAVIX 75 mg Tab Generic drug:  clopidogrel Take 75 mg by mouth. Prescriptions Sent to Pharmacy Refills  
 mupirocin (BACTROBAN) 2 % ointment 0 Sig: Apply  to affected area two (2) times a day for 7 days. Class: Normal  
 Pharmacy: [facility] DR CLARISSA CEBALLOS [address] [facility], [address] [facility] Ph #: [phone] Route: Topical  
  
We Performed the Following Michelle 68 [[id number] [facility]] LIPID PANEL [50156 CPT(R)] METABOLIC PANEL, BASIC [91095 CPT(R)] [place] ANNUAL ALCOHOL SCREEN 15 MIN [id number] [facility]] Follow-up Instructions Return in about 2 days (around 5/3/2018) for routine follow up. Patient Instructions Medicare Wellness Visit, Male The best way to live healthy is to have a healthy lifestyle by eating a well-balanced diet, exercising regularly, limiting alcohol and stopping smoking. Regular physical exams and screening tests are another way to keep healthy. Preventive exams provided by your health care provider can find health problems before they become diseases or illnesses. Preventive services including immunizations, screening tests, monitoring and exams can help you take care of your own health. All people over age 72 should have a pneumovax  and and a prevnar shot to prevent pneumonia. These are once in a lifetime unless you and your provider decide differently. All people over 65 should have a yearly flu shot and a tetanus vaccine every 10 years. Screening for diabetes mellitus with a blood sugar test should be done every year. Glaucoma is a disease of the eye due to increased ocular pressure that can lead to blindness and it should be done every year by an eye professional. 
 
Cardiovascular screening tests that check for elevated lipids (fatty part of blood) which can lead to heart disease and strokes should be done every 5 years. Colorectal screening that evaluates for blood or polyps in your colon should be done yearly as a stool test or every five years as a flexible sigmoidoscope or every 10 years as a colonoscopy up to age 76. Men up to age 76 may need a screening blood test for prostate cancer at certain intervals, depending on their personal and family history. This decision is between the patient and his provider.  
 
If you have been a smoker or had family history of abdominal aortic aneurysms, you and your provider may decide to schedule an ultrasound test of your aorta. Hepatitis C screening is also recommended for anyone born between [birth year] through [address]. A shingles vaccine is also recommended once in a lifetime after age [address] Your Medicare Wellness Exam is recommended annually. Here is a list of your current Health Maintenance items with a due date: 
Health Maintenance Due Topic Date Due  
 DTaP/Tdap/Td  (1 - Tdap) 09/27/1952  Shingles Vaccine  07/27/1991  Glaucoma Screening   09/27/1996 Amberly Madrid Annual Well Visit  03/23/2018 [facility] SERVICES! Dear Sylvia Babin: Thank you for requesting a [employer] account. Our records indicate that you already have an active [employer] account. You can access your account anytime at [email or web address]. Company Cubed/Promip Agro Biotecnologia Did you know that you can access your hospital and ER discharge instructions at any time in [employer]? You can also review all of your test results from your hospital stay or ER visit. Additional Information If you have questions, please visit the Frequently Asked Questions section of the [employer] website at [email or web address]/. Remember, [employer] is NOT to be used for urgent needs. For medical emergencies, dial 911. Now available from your iPhone and Android! Please provide this summary of care documentation to your next provider. Your primary care clinician is listed as Jeff Arrieta. If you have any questions after today's visit, please call [phone].

## 2018-05-01 NOTE — PROGRESS NOTES
Chief Complaint   Patient presents with    Cyst     back of neck    Wrist Pain     L/wrist pain    Annual Wellness Visit     I have reviewed the patient's medical history in detail and updated the computerized patient record. Health Maintenance reviewed. 1. Have you been to the ER, urgent care clinic since your last visit? Hospitalized since your last visit?no    2. Have you seen or consulted any other health care providers outside of the [address] [place] [address] since your last visit? Include any pap smears or colon screening.  No    Pt stated he/she does have an Advance Directive

## 2018-05-01 NOTE — PATIENT INSTRUCTIONS

## 2018-05-01 NOTE — PROGRESS NOTES
This is the Subsequent Medicare Annual Wellness Exam, performed 12 months or more after the Initial AWV or the last Subsequent AWV    I have reviewed the patient's medical history in detail and updated the computerized patient record. History   Agrees to wellness visit. Reports taking blood pressure medications without side affects. No complaints of exertional chest pain, excessive shortness of breath or focal weakness. Minimal swelling in lower legs or dizziness with standing. Notes knot on neck x 3-4 days, has had them previously and they busted on their own but this 1 has not yet. Had some left hand arthritis but it has resolved. Reports minimal usage of inhalers. 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    Sensorineural hearing loss     as of 5/28/15 pt wears hearing aids    Unspecified adverse effect of anesthesia 1960s    delayed awaking      Past Surgical History:   Procedure Laterality Date    HX CATARACT REMOVAL Bilateral 2004    HX COLONOSCOPY  2006    HX CYST REMOVAL  1960    HX INTRAOCULAR LENS PROSTHESIS      bilateral    HX LUMBAR LAMINECTOMY  2006    [facility] - Dr Favian Babcock  prior to 2014    and bicep repair    TOTAL KNEE ARTHROPLASTY Right 8/4/14     Current Outpatient Prescriptions   Medication Sig Dispense Refill    albuterol (PROVENTIL HFA, VENTOLIN HFA, PROAIR HFA) 90 mcg/actuation inhaler Take 1 Puff by inhalation every six (6) hours as needed for Wheezing. 1 Inhaler 5    clopidogrel (PLAVIX) 75 mg tab Take 75 mg by mouth.       atorvastatin (LIPITOR) 40 mg tablet Take 1 Tab by mouth daily. 30 Tab 12    aspirin delayed-release 81 mg tablet Take  by mouth every morning.  metoprolol succinate (TOPROL-XL) 50 mg XL tablet Take 50 mg by mouth every evening.  amLODIPine (NORVASC) 5 mg tablet Take 5 mg by mouth every morning.  losartan (COZAAR) 100 mg tablet Take 1 Tab by mouth daily. (Patient taking differently: Take 100 mg by mouth every morning.) 90 Tab 0    DOCOSAHEXANOIC ACID/EPA (FISH OIL PO) Take  by mouth two (2) times a day.  multivitamin (ONE A DAY) tablet Take 1 Tab by mouth daily (after dinner).  80 Tab 3     No Known Allergies  Family History   Problem Relation Age of Onset   Marti Whiteside Cancer Father      lung    Stroke Mother     Diabetes Mother     Diabetes Sister     Cancer Sister      Social History   Substance Use Topics    Smoking status: Former Smoker     Packs/day: 1.00     Years: 26.00     Types: Cigarettes     Quit date: 1976    Smokeless tobacco: Never Used    Alcohol use 0.6 oz/week     1 Shots of liquor, 0 Standard drinks or equivalent per week      Comment: 4-5x week burbon in afternoon     Patient Active Problem List   Diagnosis Code    Hypertension I10    Hyperlipidemia E78.5    DDD (degenerative disc disease), lumbar M51.36    BPH (benign prostatic hyperplasia) N40.0    Aortic stenosis I35.0    Right knee DJD M17.11    Hoarse voice quality R49.0    ACP (advance care planning) Z71.89    History of throat cancer Z85.819    Presence of stent in coronary artery in patient with coronary artery disease I25.10, Z95.5    Mild asthma J45.998       Depression Risk Factor Screening:     PHQ over the last two weeks 2018   Little interest or pleasure in doing things Several days   Feeling down, depressed or hopeless Several days   Total Score PHQ 2 2     Alcohol Risk Factor Screenin shot per day freq not more    Functional Ability and Level of Safety:   Hearing Loss  The patient needs further evaluation. Has aids,   Activities of Daily Living  The home contains: handrails and grab bars  Patient does total self care    Fall Risk  Fall Risk Assessment, last 12 mths 5/1/2018   Able to walk? Yes   Fall in past 12 months? No       Abuse Screen  Patient is not abused    Cognitive Screening   Evaluation of Cognitive Function:  Has your family/caregiver stated any concerns about your memory: no  Normal    Patient Care Team   Patient Care Team:  Derek Rodriguez MD as PCP - General (Family Practice)  Dm Reyna MD (Family Practice)  Cards Dr Matt Ag  Urology [facility] Dr Ruel Grayson MCV  Derm = [facility]  ENT [place]  Onc[facility]lyar X[facility]g [facility]    Visit Vitals    /73 (BP 1 Location: Left arm, BP Patient Position: Sitting)    Pulse 68    Temp 98.1 °F (36.7 °C) (Oral)    Resp 18    Ht 5' 7\" (1.702 m)    Wt 188 lb (85.3 kg)    SpO2 95%    BMI 29.44 kg/m2     WD WN male NAD neck firm 8 cm nodule left posterior triangle of the neck. Heart RRR without murmers clicks or rubs  Lungs CTA  Abdo soft nontender  Ext no edema    Assessment/Plan   Education and counseling provided:  Are appropriate based on today's review and evaluation      ICD-10-CM ICD-9-CM    1. Medicare annual wellness visit, subsequent Z00.00 V70.0    2. Essential hypertension I02 243.5 METABOLIC PANEL, BASIC   3. Hyperlipidemia, unspecified hyperlipidemia type E78.5 272.4 LIPID PANEL   4. Presence of stent in coronary artery in patient with coronary artery disease I25.10 414.01     Z95.5 V45.82    5. Mild intermittent asthma without complication O08.47 513.80    6. Screening for alcoholism Z13.89 V79.1 [place] ANNUAL ALCOHOL SCREEN 15 MIN   7. Screening for depression Z13.89 V79.0 DEPRESSION SCREEN ANNUAL   8. Boil, neck L02.12 680.1      He will return for incision and drainage of the neck. Asthma stable. Coronary artery disease stable. Hypertension stable.     Orders Placed This Encounter    Depression Screen Annual    METABOLIC PANEL, BASIC    LIPID PANEL    Annual  Alcohol Screen 15 min ()    mupirocin (BACTROBAN) 2 % ointment     Sig: Apply  to affected area two (2) times a day for 7 days. Dispense:  22 g     Refill:  0       Health Maintenance Due   Topic Date Due    DTaP/Tdap/Td series (1 - Tdap) 09/27/1952    ZOSTER VACCINE AGE 60>  07/27/1991    GLAUCOMA SCREENING Q2Y  09/27/1996    MEDICARE YEARLY EXAM  03/23/2018     Follow-up Disposition:  Return in about 2 days (around 5/3/2018) for routine follow up. Chronic Conditions Addressed Today     1. Hypertension     Relevant Orders     METABOLIC PANEL, BASIC    2. Hyperlipidemia     Relevant Orders     LIPID PANEL    3. Presence of stent in coronary artery in patient with coronary artery disease    4.  Mild asthma      Acute Diagnoses Addressed Today     Medicare annual wellness visit, subsequent    -  Primary    Screening for alcoholism            Relevant Orders        [place] ANNUAL ALCOHOL SCREEN 15 MIN    Screening for depression            Relevant Orders        DEPRESSION SCREEN ANNUAL    Boil, neck            Relevant Medications        mupirocin (BACTROBAN) 2 % ointment

## 2018-05-02 LAB
BUN SERPL-MCNC: 29 MG/DL (ref 8–27)
BUN/CREAT SERPL: 24 (ref 10–24)
CALCIUM SERPL-MCNC: 9.6 MG/DL (ref 8.6–10.2)
CHLORIDE SERPL-SCNC: 102 MMOL/L (ref 96–106)
CHOLEST SERPL-MCNC: 122 MG/DL (ref 100–199)
CO2 SERPL-SCNC: 21 MMOL/L (ref 18–29)
CREAT SERPL-MCNC: 1.19 MG/DL (ref 0.76–1.27)
GFR SERPLBLD CREATININE-BSD FMLA CKD-EPI: 55 ML/MIN/1.73
GFR SERPLBLD CREATININE-BSD FMLA CKD-EPI: 64 ML/MIN/1.73
GLUCOSE SERPL-MCNC: 99 MG/DL (ref 65–99)
HDLC SERPL-MCNC: 46 MG/DL
INTERPRETATION, 910389: NORMAL
INTERPRETATION: NORMAL
LDLC SERPL CALC-MCNC: 58 MG/DL (ref 0–99)
PDF IMAGE, 910387: NORMAL
POTASSIUM SERPL-SCNC: 4.5 MMOL/L (ref 3.5–5.2)
SODIUM SERPL-SCNC: 138 MMOL/L (ref 134–144)
TRIGL SERPL-MCNC: 88 MG/DL (ref 0–149)
VLDLC SERPL CALC-MCNC: 18 MG/DL (ref 5–40)

## 2018-05-03 ENCOUNTER — OFFICE VISIT (OUTPATIENT)
Dept: INTERNAL MEDICINE CLINIC | Age: 83
End: 2018-05-03

## 2018-05-03 VITALS
SYSTOLIC BLOOD PRESSURE: 123 MMHG | HEART RATE: 66 BPM | BODY MASS INDEX: 29.35 KG/M2 | TEMPERATURE: 97.6 F | RESPIRATION RATE: 20 BRPM | DIASTOLIC BLOOD PRESSURE: 55 MMHG | HEIGHT: 67 IN | OXYGEN SATURATION: 96 % | WEIGHT: 187 LBS

## 2018-05-03 DIAGNOSIS — E78.5 HYPERLIPIDEMIA, UNSPECIFIED HYPERLIPIDEMIA TYPE: ICD-10-CM

## 2018-05-03 DIAGNOSIS — I10 ESSENTIAL HYPERTENSION: ICD-10-CM

## 2018-05-03 DIAGNOSIS — B35.6 TINEA INGUINALIS: ICD-10-CM

## 2018-05-03 DIAGNOSIS — L02.12 BOIL, NECK: Primary | ICD-10-CM

## 2018-05-03 RX ORDER — NYSTATIN 100000 U/G
CREAM TOPICAL 2 TIMES DAILY
Qty: 15 G | Refills: 0 | Status: SHIPPED | OUTPATIENT
Start: 2018-05-03 | End: 2018-08-01

## 2018-05-03 NOTE — MR AVS SNAPSHOT
[address] [address]96 [facility] 
[phone] Patient: Virgie Pack MRN: [id number] [facility]:[birth year] Visit Information Date & Time Provider Department Dept. Phone Encounter #  
 5/3/2018  9:00 AM Marcia Dunn MD [address] [facility] [phone] [id number] Follow-up Instructions Return in about 3 months (around 8/3/2018) for routine follow up. Upcoming Health Maintenance Date Due DTaP/Tdap/Td series (1 - Tdap) 9/27/1952 ZOSTER VACCINE AGE 60> 7/27/1991 GLAUCOMA SCREENING Q2Y 9/27/1996 Influenza Age 5 to Adult 8/1/2018 MEDICARE YEARLY EXAM 5/2/2019 Allergies as of 5/3/2018  Review Complete On: 5/3/2018 By: Dianne Mcdowell LPN No Known Allergies Current Immunizations  Reviewed on 9/21/2017 Name Date Influenza High Dose Vaccine PF 9/21/2017, 10/24/2016 Influenza Vaccine 9/26/2013 Influenza Vaccine (Quad) 9/17/2015  9:45 AM, 10/9/2014 Pneumococcal Conjugate (PCV-13) 6/24/2016 Pneumococcal Polysaccharide (PPSV-23) 12/19/2013 Not reviewed this visit You Were Diagnosed With   
  
 Codes Comments Tinea inguinalis    -  Primary ICD-10-CM: B35.6 ICD-9-CM: 110.3 Boil, neck     ICD-10-CM: L02.12 
ICD-9-CM: 680.1 Vitals BP Pulse Temp Resp Height(growth percentile) Weight(growth percentile) 123/55 (BP 1 Location: Right arm, BP Patient Position: At rest) 66 97.6 °F (36.4 °C) (Oral) 20 5' 7\" (1.702 m) 187 lb (84.8 kg) SpO2 BMI Smoking Status 96% 29.29 kg/m2 Former Smoker Vitals History BMI and BSA Data Body Mass Index Body Surface Area  
 29.29 kg/m 2 2 m 2 Preferred Pharmacy Pharmacy Name Phone [facility] [address] [place] [address], [place] [address] [facility] [phone] Your Updated Medication List  
  
   
This list is accurate as of 5/3/18  9:27 AM.  Always use your most recent med list.  
  
  
  
  
 albuterol 90 mcg/actuation inhaler Commonly known as:  PROVENTIL HFA, VENTOLIN HFA, PROAIR HFA Take 1 Puff by inhalation every six (6) hours as needed for Wheezing. aspirin delayed-release 81 mg tablet Take  by mouth every morning. atorvastatin 40 mg tablet Commonly known as:  LIPITOR Take 1 Tab by mouth daily. FISH OIL PO Take  by mouth two (2) times a day. losartan 100 mg tablet Commonly known as:  COZAAR Take 1 Tab by mouth daily. metoprolol succinate 50 mg XL tablet Commonly known as:  TOPROL-XL Take 50 mg by mouth every evening.  
  
 multivitamin tablet Commonly known as:  ONE A DAY Take 1 Tab by mouth daily (after dinner). mupirocin 2 % ointment Commonly known as:  [employer] Apply  to affected area two (2) times a day for 7 days. NORVASC 5 mg tablet Generic drug:  amLODIPine Take 5 mg by mouth every morning. nystatin topical cream  
Commonly known as:  MYCOSTATIN Apply  to affected area two (2) times a day. PLAVIX 75 mg Tab Generic drug:  clopidogrel Take 75 mg by mouth. Prescriptions Sent to Pharmacy Refills  
 nystatin (MYCOSTATIN) topical cream 0 Sig: Apply  to affected area two (2) times a day. Class: Normal  
 Pharmacy: [address]  [address] [facility], [address] [facility] Ph #: [phone] Route: Topical  
  
We Performed the Following DRAIN SKIN ABSCESS SIMPLE [86712 CPT(R)] Follow-up Instructions Return in about 3 months (around 8/3/2018) for routine follow up. Introducing [facility] & HEALTH SERVICES! Dear Charlene Cea: Thank you for requesting a [employer] account. Our records indicate that you already have an active [employer] account. You can access your account anytime at [email or web address] Did you know that you can access your hospital and ER discharge instructions at any time in NONO? You can also review all of your test results from your hospital stay or ER visit. Additional Information If you have questions, please visit the Frequently Asked Questions section of the NONO website at [email or web address] Remember, NONO is NOT to be used for urgent needs. For medical emergencies, dial 911. Now available from your iPhone and Android! Please provide this summary of care documentation to your next provider. Your primary care clinician is listed as Radha Angulo. If you have any questions after today's visit, please call [phone].

## 2018-05-03 NOTE — PROGRESS NOTES
HISTORY OF PRESENT ILLNESS  Luciano Alvarenga is a 80 y.o. male. Cyst   The history is provided by the patient and relative. This is a recurrent (had cysts before but this one worse) problem. Episode onset: almost a week. The problem occurs constantly. Pertinent negatives include no chest pain and no shortness of breath. Treatments tried: [facility]. The treatment provided no relief. also look at groin lesion. Cyst was sore and aching  No chest pain or breathing problems. Stop the blood thinner 2 days ago. Patient Active Problem List   Diagnosis Code    Hypertension I10    Hyperlipidemia E78.5    DDD (degenerative disc disease), lumbar M51.36    BPH (benign prostatic hyperplasia) N40.0    Aortic stenosis I35.0    Right knee DJD M17.11    Hoarse voice quality R49.0    ACP (advance care planning) Z71.89    History of throat cancer Z85.819    Presence of stent in coronary artery in patient with coronary artery disease I25.10, Z95.5    Mild asthma J45.998         Review of Systems   Respiratory: Negative for shortness of breath. Cardiovascular: Negative for chest pain. Physical Exam  Visit Vitals    /55 (BP 1 Location: Right arm, BP Patient Position: At rest)    Pulse 66    Temp 97.6 °F (36.4 °C) (Oral)    Resp 20    Ht 5' 7\" (1.702 m)    Wt 187 lb (84.8 kg)    SpO2 96%    BMI 29.29 kg/m2     Neck posterior triangle firm 10 cm x 8 cm boil. Right sided, minimal redness, firm. Scaly intertriginous rash right groin. ASSESSMENT and PLAN  Encounter Diagnoses   Name Primary?  Tinea inguinalis Yes    Boil, neck     Essential hypertension     Hyperlipidemia, unspecified hyperlipidemia type      Orders Placed This Encounter    DRAIN SKIN ABSCESS SIMPLE    nystatin (MYCOSTATIN) topical cream     Options discussed. Discussed possible side affects and benefits of the procedure and/or medications. The patient agrees to undergo the procedure. Consent obtained.  Sterile procedure followed. There were no complications and the procedure was well tolerated. Instructed patient to call me if it is ineffective or if there are any complications like increasing pain, redness or swelling. The area was cleaned with alcohol. It was numbed with ethyl chloride. A incision was made over the area. It drained pus  yes, serosanguinous fluid , yes . A pressure dressing was applied. The boil was packed  yes. A wound culture was done  no. Anibiotics were given  no. The procedure was well tolerated. Groin rash is tinea. Treatment as above. Remove packing in the a.m. Hypertension stable. Follow-up Disposition:  Return in about 3 months (around 8/3/2018) for routine follow up.

## 2018-05-08 ENCOUNTER — OFFICE VISIT (OUTPATIENT)
Dept: INTERNAL MEDICINE CLINIC | Age: 83
End: 2018-05-08

## 2018-05-08 VITALS
RESPIRATION RATE: 18 BRPM | HEIGHT: 67 IN | BODY MASS INDEX: 29.03 KG/M2 | HEART RATE: 55 BPM | OXYGEN SATURATION: 96 % | WEIGHT: 185 LBS | DIASTOLIC BLOOD PRESSURE: 65 MMHG | TEMPERATURE: 96.8 F | SYSTOLIC BLOOD PRESSURE: 148 MMHG

## 2018-05-08 DIAGNOSIS — L02.12 BOIL, NECK: Primary | ICD-10-CM

## 2018-05-08 RX ORDER — SULFAMETHOXAZOLE AND TRIMETHOPRIM 400; 80 MG/1; MG/1
1 TABLET ORAL 2 TIMES DAILY
Qty: 14 TAB | Refills: 0 | Status: SHIPPED | OUTPATIENT
Start: 2018-05-08 | End: 2018-05-15

## 2018-05-08 NOTE — PROGRESS NOTES
Chief Complaint   Patient presents with    Cyst     follow up - cyst on neck painful, red and swollen     I have reviewed the patient's medical history in detail and updated the computerized patient record. Health Maintenance reviewed. 1. Have you been to the ER, urgent care clinic since your last visit? Hospitalized since your last visit?no    2. Have you seen or consulted any other health care providers outside of the [facility] since your last visit? Include any pap smears or colon screening. No    Pt stated he/she does have an Advance Directive    Fall Risk Assessment, last 12 mths 5/8/2018   Able to walk? Yes   Fall in past 12 months? No       PHQ over the last two weeks 5/8/2018   Little interest or pleasure in doing things Several days   Feeling down, depressed or hopeless Several days   Total Score PHQ 2 2       Abuse Screening Questionnaire 5/1/2018   Do you ever feel afraid of your partner? N   Are you in a relationship with someone who physically or mentally threatens you? N   Is it safe for you to go home?  Y       ADL Assessment 5/1/2018   Feeding yourself No Help Needed   Getting from bed to chair No Help Needed   Getting dressed No Help Needed   Bathing or showering No Help Needed   Walk across the room (includes cane/walker) No Help Needed   Using the telphone No Help Needed   Taking your medications No Help Needed   Preparing meals No Help Needed   Managing money (expenses/bills) No Help Needed   Moderately strenuous housework (laundry) No Help Needed   Shopping for personal items (toiletries/medicines) No Help Needed   Shopping for groceries No Help Needed   Driving No Help Needed   Climbing a flight of stairs No Help Needed   Getting to places beyond walking distances No Help Needed     [facility]  OFFICE PROCEDURE PROGRESS NOTE        Chart reviewed for the following:   IEvon LPN, have reviewed the History, Physical and updated the Allergic reactions for [place] performed immediately prior to start of procedure:   Pedro Swift LPN, have performed the following reviews on Blinda Creed prior to the start of the procedure:            * Patient was identified by name and date of birth   * Agreement on procedure being performed was verified  * Risks and Benefits explained to the patient by Dr. Maxi Rebollar  * Procedure site verified and marked as necessary  * Patient was positioned for comfort  * Consent was signed and verified     Time: 11:45AM      Date of procedure: 5/8/2018    Procedure performed by:  [place] MD    Provider assisted by: Melanie House LPN    Patient assisted by: Melanie House LPN    How tolerated by patient: Well    Post Procedural Pain Scale: 4/10    Comments: None    Dr. Maxi Rebollar  did the procedure

## 2018-05-08 NOTE — MR AVS SNAPSHOT
[address] 2973 [facility] 
[phone] Patient: Sunny Apple MRN: [id number] VFM:5/18/6127 Visit Information Date & Time Provider Department Dept. Phone Encounter #  
 5/8/2018 10:30 AM MD Angela Wood [phone] Follow-up Instructions Return in about 2 days (around 5/10/2018) for routine follow up. Your Appointments 8/3/2018  9:15 AM  
ACUTE CARE with MD Angela Wood 380 0230 [facility]) Appt Note: f/u on boil 5/3/[address] [place], [place] [id number] [facility]. [address] [place] [address]  
[phone]  
  
   
 [address] [place] [address] [address] Upcoming Health Maintenance Date Due DTaP/Tdap/Td series (1 - Tdap) 9/27/1952 ZOSTER VACCINE AGE 60> 7/27/1991 GLAUCOMA SCREENING Q2Y 9/27/1996 Influenza Age 5 to Adult 8/1/2018 MEDICARE YEARLY EXAM 5/2/2019 Allergies as of 5/8/2018  Review Complete On: 5/8/2018 By: Valentin Christopher LPN No Known Allergies Current Immunizations  Reviewed on 9/21/2017 Name Date Influenza High Dose Vaccine PF 9/21/2017, 10/24/2016 Influenza Vaccine 9/26/2013 Influenza Vaccine (Quad) 9/17/2015  9:45 AM, 10/9/2014 Pneumococcal Conjugate (PCV-13) 6/24/2016 Pneumococcal Polysaccharide (PPSV-23) 12/19/2013 Not reviewed this visit You Were Diagnosed With   
  
 Codes Comments Boil, neck    -  Primary ICD-10-CM: L02.12 
ICD-9-CM: 680.1 Vitals BP Pulse Temp Resp Height(growth percentile) Weight(growth percentile) 148/65 (BP 1 Location: Left arm, BP Patient Position: Sitting) (!) 55 96.8 °F (36 °C) (Oral) 18 5' 7\" (1.702 m) 185 lb (83.9 kg) SpO2 BMI Smoking Status 96% 28.98 kg/m2 Former Smoker Vitals History BMI and BSA Data  Body Mass Index Body Surface Area  
 28.98 kg/m 2 1.99 m 2  
 Preferred Pharmacy Pharmacy Name Phone [facility] [place] [address] [facility] [phone] Your Updated Medication List  
  
   
This list is accurate as of 5/8/18 12:08 PM.  Always use your most recent med list.  
  
  
  
  
 albuterol 90 mcg/actuation inhaler Commonly known as:  PROVENTIL HFA, VENTOLIN HFA, PROAIR HFA Take 1 Puff by inhalation every six (6) hours as needed for Wheezing. aspirin delayed-release 81 mg tablet Take  by mouth every morning. atorvastatin 40 mg tablet Commonly known as:  LIPITOR Take 1 Tab by mouth daily. FISH OIL PO Take  by mouth two (2) times a day. losartan 100 mg tablet Commonly known as:  COZAAR Take 1 Tab by mouth daily. metoprolol succinate 50 mg XL tablet Commonly known as:  TOPROL-XL Take 50 mg by mouth every evening.  
  
 multivitamin tablet Commonly known as:  ONE A DAY Take 1 Tab by mouth daily (after dinner). mupirocin 2 % ointment Commonly known as:  [employer] Apply  to affected area two (2) times a day for 7 days. NORVASC 5 mg tablet Generic drug:  amLODIPine Take 5 mg by mouth every morning. nystatin topical cream  
Commonly known as:  MYCOSTATIN Apply  to affected area two (2) times a day. PLAVIX 75 mg Tab Generic drug:  clopidogrel Take 75 mg by mouth. trimethoprim-sulfamethoxazole  mg per tablet Commonly known as:  Merrily Hoover Take 1 Tab by mouth two (2) times a day for 7 days. Prescriptions Sent to Pharmacy Refills  
 trimethoprim-sulfamethoxazole (BACTRIM, SEPTRA)  mg per tablet 0 Sig: Take 1 Tab by mouth two (2) times a day for 7 days. Class: Normal  
 Pharmacy: [facility] [address] CLARISSA CEBALLOS [address] [facility] Ph #: [phone] Route: Oral  
  
We Performed the Following DRAIN SKIN ABSCESS SIMPLE [86549 CPT(R)] Follow-up Instructions Return in about 2 days (around 5/10/2018) for routine follow up. Introducing \[facility] [place] [facility]\"" & HEALTH SERVICES! Dear Fabi Ware: Thank you for requesting a Abloomy account. Our records indicate that you already have an active Abloomy account. You can access your account anytime at [email or web address] Did you know that you can access your hospital and ER discharge instructions at any time in Abloomy? You can also review all of your test results from your hospital stay or ER visit. Additional Information If you have questions, please visit the Frequently Asked Questions section of the Abloomy website at [email or web address] Remember, Abloomy is NOT to be used for urgent needs. For medical emergencies, dial 911. Now available from your iPhone and Android! Please provide this summary of care documentation to your next provider. Your primary care clinician is listed as Kathryne Jeans. If you have any questions after today's visit, please call [phone].

## 2018-05-10 NOTE — PROGRESS NOTES
HISTORY OF PRESENT ILLNESS  Steven Babcokc is a 80 y.o. male. Cyst   The history is provided by the patient. This is a new problem. The current episode started more than 1 week ago. The problem occurs constantly. The problem has been gradually worsening. Pertinent negatives include no chest pain and no abdominal pain. Treatments tried: I&D. The treatment provided no relief. No Known Allergies    Social History     Social History    Marital status:      Spouse name: N/A    Number of children: 1    Years of education: N/A     Occupational History    retired      Social History Main Topics    Smoking status: Former Smoker     Packs/day: 1.00     Years: 26.00     Types: Cigarettes     Quit date: 1/1/1976    Smokeless tobacco: Never Used    Alcohol use 0.6 oz/week     1 Shots of liquor, 0 Standard drinks or equivalent per week      Comment: 4-5x week burbon in afternoon    Drug use: No    Sexual activity: No     Other Topics Concern    Not on file     Social History Narrative     lives on his own         Review of Systems   Cardiovascular: Negative for chest pain. Gastrointestinal: Negative for abdominal pain. Physical Exam  Visit Vitals    /65 (BP 1 Location: Left arm, BP Patient Position: Sitting)    Pulse (!) 55    Temp 96.8 °F (36 °C) (Oral)    Resp 18    Ht 5' 7\" (1.702 m)    Wt 185 lb (83.9 kg)    SpO2 96%    BMI 28.98 kg/m2     WD WN male NAD neck 10 X 7 cm boil neck, previous incision  Heart RRR without murmers clicks or rubs  Lungs CTA  Abdo soft nontender  Ext no edema    ASSESSMENT and PLAN  Encounter Diagnoses   Name Primary?  Boil, neck Yes     Orders Placed This Encounter    DRAIN SKIN ABSCESS SIMPLE    AEROBIC BACTERIAL CULTURE    trimethoprim-sulfamethoxazole (BACTRIM, SEPTRA)  mg per tablet     Options discussed. Discussed possible side affects and benefits of the procedure and/or medications. The patient agrees to undergo the procedure. Consent obtained. Sterile procedure followed. There were no complications and the procedure was well tolerated. Instructed patient to call me if it is ineffective or if there are any complications like increasing pain, redness or swelling. The area was cleaned with alcohol. It was numbed with  lido with epi. A incision was made over the area. It drained pus  yes, serosanguinous fluid , yes . A pressure dressing was applied. The boil was packed  yes. A wound culture was done  yes. Anibiotics were given  yes. The procedure was well tolerated. Follow-up Disposition:  Return in about 2 days (around 5/10/2018) for routine follow up.

## 2018-05-11 ENCOUNTER — OFFICE VISIT (OUTPATIENT)
Dept: INTERNAL MEDICINE CLINIC | Age: 83
End: 2018-05-11

## 2018-05-11 VITALS
OXYGEN SATURATION: 96 % | DIASTOLIC BLOOD PRESSURE: 74 MMHG | SYSTOLIC BLOOD PRESSURE: 128 MMHG | TEMPERATURE: 98.7 F | HEART RATE: 59 BPM | RESPIRATION RATE: 18 BRPM | BODY MASS INDEX: 29.03 KG/M2 | HEIGHT: 67 IN | WEIGHT: 185 LBS

## 2018-05-11 DIAGNOSIS — L02.92 BOIL: Primary | ICD-10-CM

## 2018-05-11 LAB — BACTERIA SPEC AEROBE CULT: ABNORMAL

## 2018-05-11 NOTE — MR AVS SNAPSHOT
[address] [place] [address]. [address] [facility] 
[phone] Patient: Jm Rodgers MRN: [id number] JIX:1/63/8809 Visit Information Date & Time Provider Department Dept. Phone Encounter #  
 5/11/2018 10:15 AM MD David Negron Dr [id number] Follow-up Instructions Return in about 3 days (around 5/14/2018). Your Appointments 8/3/2018  9:15 AM  
ACUTE CARE with MD Angela Negron [address] [place] [address]) Appt Note: f/u on boil 5/3/[address] [place], [place] [address] [facility]. [address] [place] [address]  
[phone]  
  
   
 [address] [place] [address] [address] Upcoming Health Maintenance Date Due DTaP/Tdap/Td series (1 - Tdap) 9/27/1952 ZOSTER VACCINE AGE 60> 7/27/1991 GLAUCOMA SCREENING Q2Y 9/27/1996 Influenza Age 5 to Adult 8/1/2018 MEDICARE YEARLY EXAM 5/2/2019 Allergies as of 5/11/2018  Review Complete On: 5/11/2018 By: Betty Colindres LPN No Known Allergies Current Immunizations  Reviewed on 9/21/2017 Name Date Influenza High Dose Vaccine PF 9/21/2017, 10/24/2016 Influenza Vaccine 9/26/2013 Influenza Vaccine (Quad) 9/17/2015  9:45 AM, 10/9/2014 Pneumococcal Conjugate (PCV-13) 6/24/2016 Pneumococcal Polysaccharide (PPSV-23) 12/19/2013 Not reviewed this visit You Were Diagnosed With   
  
 Codes Comments Boil    -  Primary ICD-10-CM: L02.92 
ICD-9-CM: 680.9 Vitals BP Pulse Temp Resp Height(growth percentile) Weight(growth percentile) 128/74 (BP 1 Location: Right arm, BP Patient Position: Sitting) (!) 59 98.7 °F (37.1 °C) (Oral) 18 5' 7\" (1.702 m) 185 lb (83.9 kg) SpO2 BMI Smoking Status 96% 28.98 kg/m2 Former Smoker Vitals History BMI and BSA Data  Body Mass Index Body Surface Area  
 28.98 kg/m 2 1.99 m 2  
  
  
 Preferred Pharmacy Pharmacy Name Phone [facility] [address] [facility] [phone] Your Updated Medication List  
  
   
This list is accurate as of 5/11/18 10:41 AM.  Always use your most recent med list.  
  
  
  
  
 albuterol 90 mcg/actuation inhaler Commonly known as:  PROVENTIL HFA, VENTOLIN HFA, PROAIR HFA Take 1 Puff by inhalation every six (6) hours as needed for Wheezing. aspirin delayed-release 81 mg tablet Take  by mouth every morning. atorvastatin 40 mg tablet Commonly known as:  LIPITOR Take 1 Tab by mouth daily. FISH OIL PO Take  by mouth two (2) times a day. losartan 100 mg tablet Commonly known as:  COZAAR Take 1 Tab by mouth daily. metoprolol succinate 50 mg XL tablet Commonly known as:  TOPROL-XL Take 50 mg by mouth every evening.  
  
 multivitamin tablet Commonly known as:  ONE A DAY Take 1 Tab by mouth daily (after dinner). NORVASC 5 mg tablet Generic drug:  amLODIPine Take 5 mg by mouth every morning. nystatin topical cream  
Commonly known as:  MYCOSTATIN Apply  to affected area two (2) times a day. PLAVIX 75 mg Tab Generic drug:  clopidogrel Take 75 mg by mouth. trimethoprim-sulfamethoxazole  mg per tablet Commonly known as:  Walker Gentleman Take 1 Tab by mouth two (2) times a day for 7 days. Follow-up Instructions Return in about 3 days (around 5/14/2018). Introducing [facility] & HEALTH SERVICES! Deatacho Cartwright Has: Thank you for requesting a Normal account. Our records indicate that you already have an active Normal account. You can access your account anytime at [email or web address]/[employer] Did you know that you can access your hospital and ER discharge instructions at any time in Normal? You can also review all of your test results from your hospital stay or ER visit. Additional Information If you have questions, please visit the Frequently Asked Questions section of the SpotRighthart website at [email or web address] Remember, agnion Energy is NOT to be used for urgent needs. For medical emergencies, dial 911. Now available from your iPhone and Android! Please provide this summary of care documentation to your next provider. Your primary care clinician is listed as Alberta Vega. If you have any questions after today's visit, please call [phone].

## 2018-05-11 NOTE — LETTER
5/11/2018 10:31 AM 
 
Mr. Stephane Rice [address] [facility] [address] Dear Stephane Rice: 
 
Please find your most recent results below. Resulted Orders AEROBIC BACTERIAL CULTURE Result Value Ref Range Aerobic culture Staphylococcus aureus Heavy growth (A) Comment:  
   Methicillin resistant (MRSA) Based on resistance to oxacillin this isolate would be resistant to 
all currently available beta-lactam antimicrobial agents, with the 
exception of the newer cephalosporins with anti-MRSA activity, such as Ceftaroline Narrative Performed at:  [address]  [id number] : Ramin Pablo MD, Phone:  [phone] RECOMMENDATIONS: 
Continue with current  Medications, bactrim. Please call me if you have any questions: [phone] Sincerely, Darian Tinoco MD

## 2018-05-11 NOTE — PROGRESS NOTES
Chief Complaint   Patient presents with    Cyst     back of L/side of neck, large, red, painful and draining thick dark yellow fluid     I have reviewed the patient's medical history in detail and updated the computerized patient record. Health Maintenance reviewed. 1. Have you been to the ER, urgent care clinic since your last visit? Hospitalized since your last visit?no    2. Have you seen or consulted any other health care providers outside of the [facility] since your last visit? Include any pap smears or colon screening. no    Encouraged pt to discuss pt's wishes with spouse/partner/family and bring them in the next appt to follow thru with the Advanced Directive    Fall Risk Assessment, last 12 mths 5/11/2018   Able to walk? Yes   Fall in past 12 months? No       PHQ over the last two weeks 5/11/2018   Little interest or pleasure in doing things Several days   Feeling down, depressed or hopeless Several days   Total Score PHQ 2 2       Abuse Screening Questionnaire 5/1/2018   Do you ever feel afraid of your partner? N   Are you in a relationship with someone who physically or mentally threatens you? N   Is it safe for you to go home?  Y       ADL Assessment 5/1/2018   Feeding yourself No Help Needed   Getting from bed to chair No Help Needed   Getting dressed No Help Needed   Bathing or showering No Help Needed   Walk across the room (includes cane/walker) No Help Needed   Using the telphone No Help Needed   Taking your medications No Help Needed   Preparing meals No Help Needed   Managing money (expenses/bills) No Help Needed   Moderately strenuous housework (laundry) No Help Needed   Shopping for personal items (toiletries/medicines) No Help Needed   Shopping for groceries No Help Needed   Driving No Help Needed   Climbing a flight of stairs No Help Needed   Getting to places beyond walking distances No Help Needed

## 2018-05-12 NOTE — PROGRESS NOTES
HISTORY OF PRESENT ILLNESS  Juan Henderson is a 80 y.o. male. Cyst   The history is provided by the patient. This is a new problem. The current episode started more than 1 week ago. The problem occurs constantly. The problem has not changed since onset. Associated symptoms comments: Boil neck left side posterior triangle. Treatments tried: 2 nd I and D now on bactrim. 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 Musculoskeletal: Positive for neck pain. Physical Exam  Visit Vitals    /74 (BP 1 Location: Right arm, BP Patient Position: Sitting)    Pulse (!) 59    Temp 98.7 °F (37.1 °C) (Oral)    Resp 18    Ht 5' 7\" (1.702 m)    Wt 185 lb (83.9 kg)    SpO2 96%    BMI 28.98 kg/m2     WD WN male NAD  Heart RRR without murmers clicks or rubs  Lungs CTA  Abdo soft nontender  Ext no edema  Boil 10 by 6 cm firm draining pus    ASSESSMENT and PLAN  Encounter Diagnoses   Name Primary?  Boil Yes     Not worse maybe sl improvement, no change in Rx, boil re-packed.  Remove packing in AM.

## 2018-05-14 ENCOUNTER — OFFICE VISIT (OUTPATIENT)
Dept: INTERNAL MEDICINE CLINIC | Age: 83
End: 2018-05-14

## 2018-05-14 VITALS
BODY MASS INDEX: 29.03 KG/M2 | SYSTOLIC BLOOD PRESSURE: 149 MMHG | RESPIRATION RATE: 16 BRPM | HEIGHT: 67 IN | OXYGEN SATURATION: 95 % | HEART RATE: 57 BPM | WEIGHT: 185 LBS | DIASTOLIC BLOOD PRESSURE: 65 MMHG | TEMPERATURE: 98.9 F

## 2018-05-14 DIAGNOSIS — L02.12 BOIL OF NECK: Primary | ICD-10-CM

## 2018-05-14 NOTE — MR AVS SNAPSHOT
[address] 7768 [facility]ly [facility] 
[phone] Patient: Steven Babcock MRN: [id number] IFB:[id number] Visit Information Date & Time Provider Department Dept. Phone Encounter #  
 5/14/2018 10:30 AM MD David Burrows Dr [id number] Follow-up Instructions Return in about 6 weeks (around 6/25/2018) for routine follow up. Your Appointments 8/3/2018  9:15 AM  
ACUTE CARE with MD Angela Burrows [phone] [facility]) Appt Note: f/u on boil 5/3/[facility]  
 1362 [address] [facility] [address] [place] [address]  
[phone]  
  
   
 [address] [place] [address] [address] Upcoming Health Maintenance Date Due DTaP/Tdap/Td series (1 - Tdap) 9/27/1952 ZOSTER VACCINE AGE 60> 7/27/1991 GLAUCOMA SCREENING Q2Y 9/27/1996 Influenza Age 5 to Adult 8/1/2018 MEDICARE YEARLY EXAM 5/2/2019 Allergies as of 5/14/2018  Review Complete On: 5/14/2018 By: Leonidas Garduno LPN No Known Allergies Current Immunizations  Reviewed on 9/21/2017 Name Date Influenza High Dose Vaccine PF 9/21/2017, 10/24/2016 Influenza Vaccine 9/26/2013 Influenza Vaccine (Quad) 9/17/2015  9:45 AM, 10/9/2014 Pneumococcal Conjugate (PCV-13) 6/24/2016 Pneumococcal Polysaccharide (PPSV-23) 12/19/2013 Not reviewed this visit You Were Diagnosed With   
  
 Codes Comments Boil of neck    -  Primary ICD-10-CM: L02.12 
ICD-9-CM: 680.1 Vitals BP Pulse Temp Resp Height(growth percentile) Weight(growth percentile) 149/65 (BP 1 Location: Left arm, BP Patient Position: Sitting) (!) 57 98.9 °F (37.2 °C) (Oral) 16 5' 7\" (1.702 m) 185 lb (83.9 kg) SpO2 BMI Smoking Status 95% 28.98 kg/m2 Former Smoker BMI and BSA Data  Body Mass Index Body Surface Area  
 28.98 kg/m 2 1.99 m 2  
  
  
 Preferred Pharmacy Pharmacy Name Phone [facility] [address] 9 [facility] [phone] Your Updated Medication List  
  
   
This list is accurate as of 5/14/18 10:44 AM.  Always use your most recent med list.  
  
  
  
  
 albuterol 90 mcg/actuation inhaler Commonly known as:  PROVENTIL HFA, VENTOLIN HFA, PROAIR HFA Take 1 Puff by inhalation every six (6) hours as needed for Wheezing. aspirin delayed-release 81 mg tablet Take  by mouth every morning. atorvastatin 40 mg tablet Commonly known as:  LIPITOR Take 1 Tab by mouth daily. FISH OIL PO Take  by mouth two (2) times a day. losartan 100 mg tablet Commonly known as:  COZAAR Take 1 Tab by mouth daily. metoprolol succinate 50 mg XL tablet Commonly known as:  TOPROL-XL Take 50 mg by mouth every evening.  
  
 multivitamin tablet Commonly known as:  ONE A DAY Take 1 Tab by mouth daily (after dinner). NORVASC 5 mg tablet Generic drug:  amLODIPine Take 5 mg by mouth every morning. nystatin topical cream  
Commonly known as:  MYCOSTATIN Apply  to affected area two (2) times a day. PLAVIX 75 mg Tab Generic drug:  clopidogrel Take 75 mg by mouth. trimethoprim-sulfamethoxazole  mg per tablet Commonly known as:  Jesus Searing Take 1 Tab by mouth two (2) times a day for 7 days. Follow-up Instructions Return in about 6 weeks (around 6/25/2018) for routine follow up. Introducing [facility]! Dear Gabe Girard: Thank you for requesting a Trunkbow account. Our records indicate that you already have an active Trunkbow account. You can access your account anytime at [email or web address] Did you know that you can access your hospital and ER discharge instructions at any time in Trunkbow? You can also review all of your test results from your hospital stay or ER visit. Additional Information If you have questions, please visit the Frequently Asked Questions section of the [employer] website at [email or web address] Remember, [employer] is NOT to be used for urgent needs. For medical emergencies, dial 911. Now available from your iPhone and Android! Please provide this summary of care documentation to your next provider. Your primary care clinician is listed as Kathryne Jeans. If you have any questions after today's visit, please call [phone].

## 2018-05-14 NOTE — PROGRESS NOTES
Chief Complaint   Patient presents with    Cyst     back of neck draining thick yellow/off white fluid, tender and slightly swollen     I have reviewed the patient's medical history in detail and updated the computerized patient record. Health Maintenance reviewed. 1. Have you been to the ER, urgent care clinic since your last visit? Hospitalized since your last visit?no    2. Have you seen or consulted any other health care providers outside of the [facility] since your last visit? Include any pap smears or colon screening. No    Pt stated he/she does have an Advance Directive    Fall Risk Assessment, last 12 mths 5/14/2018   Able to walk? Yes   Fall in past 12 months? No       PHQ over the last two weeks 5/14/2018   Little interest or pleasure in doing things Several days   Feeling down, depressed or hopeless Several days   Total Score PHQ 2 2       Abuse Screening Questionnaire 5/1/2018   Do you ever feel afraid of your partner? N   Are you in a relationship with someone who physically or mentally threatens you? N   Is it safe for you to go home?  Y       ADL Assessment 5/1/2018   Feeding yourself No Help Needed   Getting from bed to chair No Help Needed   Getting dressed No Help Needed   Bathing or showering No Help Needed   Walk across the room (includes cane/walker) No Help Needed   Using the telphone No Help Needed   Taking your medications No Help Needed   Preparing meals No Help Needed   Managing money (expenses/bills) No Help Needed   Moderately strenuous housework (laundry) No Help Needed   Shopping for personal items (toiletries/medicines) No Help Needed   Shopping for groceries No Help Needed   Driving No Help Needed   Climbing a flight of stairs No Help Needed   Getting to places beyond walking distances No Help Needed

## 2018-05-14 NOTE — PROGRESS NOTES
HISTORY OF PRESENT ILLNESS  Cristóbal Dobson is a 80 y.o. male. Cyst   The history is provided by the patient. This is a new problem. The problem has been gradually improving. ROS    Physical Exam  Visit Vitals    /65 (BP 1 Location: Left arm, BP Patient Position: Sitting)    Pulse (!) 57    Temp 98.9 °F (37.2 °C) (Oral)    Resp 16    Ht 5' 7\" (1.702 m)    Wt 185 lb (83.9 kg)    SpO2 95%    BMI 28.98 kg/m2     Boli still draining, smaller than beforerepacked  ASSESSMENT and PLAN  Encounter Diagnoses   Name Primary?  Boil of neck Yes     Finish AB  Follow-up Disposition:  Return in about 6 weeks (around 6/25/2018) for routine follow up.

## 2018-07-16 ENCOUNTER — CLINICAL SUPPORT (OUTPATIENT)
Dept: INTERNAL MEDICINE CLINIC | Age: 83
End: 2018-07-16

## 2018-07-16 VITALS
HEART RATE: 46 BPM | TEMPERATURE: 96.8 F | RESPIRATION RATE: 16 BRPM | DIASTOLIC BLOOD PRESSURE: 66 MMHG | SYSTOLIC BLOOD PRESSURE: 145 MMHG | OXYGEN SATURATION: 96 %

## 2018-07-16 DIAGNOSIS — I10 ESSENTIAL HYPERTENSION: Primary | ICD-10-CM

## 2018-07-16 NOTE — PROGRESS NOTES
Chief Complaint   Patient presents with    Blood Pressure Check     nurse visit follow up     Dr. Alexandro Alexandre aware of pt's BP, no new orders given.

## 2018-08-01 ENCOUNTER — OFFICE VISIT (OUTPATIENT)
Dept: NEUROLOGY | Age: 83
End: 2018-08-01

## 2018-08-01 VITALS
SYSTOLIC BLOOD PRESSURE: 134 MMHG | OXYGEN SATURATION: 98 % | DIASTOLIC BLOOD PRESSURE: 70 MMHG | WEIGHT: 189 LBS | HEIGHT: 67 IN | BODY MASS INDEX: 29.66 KG/M2 | HEART RATE: 76 BPM

## 2018-08-01 DIAGNOSIS — E78.2 MIXED HYPERLIPIDEMIA: ICD-10-CM

## 2018-08-01 DIAGNOSIS — Z85.01 HISTORY OF ESOPHAGEAL CANCER: ICD-10-CM

## 2018-08-01 DIAGNOSIS — I10 ESSENTIAL HYPERTENSION: ICD-10-CM

## 2018-08-01 DIAGNOSIS — R20.0 HAND NUMBNESS: Primary | ICD-10-CM

## 2018-08-03 ENCOUNTER — OFFICE VISIT (OUTPATIENT)
Dept: INTERNAL MEDICINE CLINIC | Age: 83
End: 2018-08-03

## 2018-08-03 VITALS
HEART RATE: 48 BPM | SYSTOLIC BLOOD PRESSURE: 138 MMHG | RESPIRATION RATE: 18 BRPM | WEIGHT: 187 LBS | HEIGHT: 67 IN | TEMPERATURE: 97.4 F | DIASTOLIC BLOOD PRESSURE: 55 MMHG | BODY MASS INDEX: 29.35 KG/M2 | OXYGEN SATURATION: 97 %

## 2018-08-03 DIAGNOSIS — I25.10 PRESENCE OF STENT IN CORONARY ARTERY IN PATIENT WITH CORONARY ARTERY DISEASE: ICD-10-CM

## 2018-08-03 DIAGNOSIS — I10 ESSENTIAL HYPERTENSION: Primary | ICD-10-CM

## 2018-08-03 DIAGNOSIS — Z95.5 PRESENCE OF STENT IN CORONARY ARTERY IN PATIENT WITH CORONARY ARTERY DISEASE: ICD-10-CM

## 2018-08-03 DIAGNOSIS — E78.5 HYPERLIPIDEMIA, UNSPECIFIED HYPERLIPIDEMIA TYPE: ICD-10-CM

## 2018-08-03 NOTE — PROGRESS NOTES
Chief Complaint Patient presents with  Cyst  
  follow up neck I have reviewed the patient's medical history in detail and updated the computerized patient record. Health Maintenance reviewed. 1. Have you been to the ER, urgent care clinic since your last visit? Hospitalized since your last visit?no 2. Have you seen or consulted any other health care providers outside of the [facility] since your last visit? Include any pap smears or colon screening. no 
 
Pt stated he/she does have an Advance Directive Fall Risk Assessment, last 12 mths 8/3/2018 Able to walk? Yes Fall in past 12 months? No  
 
 
PHQ over the last two weeks 8/3/2018 Little interest or pleasure in doing things Several days Feeling down, depressed, irritable, or hopeless Several days Total Score PHQ 2 2 Abuse Screening Questionnaire 5/1/2018 Do you ever feel afraid of your partner? Mikal March Are you in a relationship with someone who physically or mentally threatens you? Mikal March Is it safe for you to go home? Y  
 
 
ADL Assessment 5/1/2018 Feeding yourself No Help Needed Getting from bed to chair No Help Needed Getting dressed No Help Needed Bathing or showering No Help Needed Walk across the room (includes cane/walker) No Help Needed Using the telphone No Help Needed Taking your medications No Help Needed Preparing meals No Help Needed Managing money (expenses/bills) No Help Needed Moderately strenuous housework (laundry) No Help Needed Shopping for personal items (toiletries/medicines) No Help Needed Shopping for groceries No Help Needed Driving No Help Needed Climbing a flight of stairs No Help Needed Getting to places beyond walking distances No Help Needed Called [facility] [address] [facility] and no records for pt's shingle shot

## 2018-08-03 NOTE — MR AVS SNAPSHOT
[address] [address]30 [facility] 
[phone] Patient: Dianna Whitaker MRN: [id number] M:[id number] Visit Information Date & Time Provider Department Dept. Phone Encounter #  
 8/3/2018  9:15 AM Jim Chen MD [facility] Albina Werner [id number] Follow-up Instructions Return in about 3 months (around 11/3/2018) for routine follow up. Upcoming Health Maintenance Date Due DTaP/Tdap/Td series (1 - Tdap) 9/27/1952 ZOSTER VACCINE AGE 60> 7/27/1991 GLAUCOMA SCREENING Q2Y 9/27/1996 Influenza Age 5 to Adult 8/1/2018 MEDICARE YEARLY EXAM 5/2/2019 Allergies as of 8/3/2018  Review Complete On: 8/1/2018 By: Diane Quinteros MD  
 No Known Allergies Current Immunizations  Reviewed on 9/21/2017 Name Date Influenza High Dose Vaccine PF 9/21/2017, 10/24/2016 Influenza Vaccine 9/26/2013 Influenza Vaccine (Quad) 9/17/2015  9:45 AM, 10/9/2014 Pneumococcal Conjugate (PCV-13) 6/24/2016 Pneumococcal Polysaccharide (PPSV-23) 12/19/2013 Not reviewed this visit You Were Diagnosed With   
  
 Codes Comments Essential hypertension    -  Primary ICD-10-CM: I10 
ICD-9-CM: 401.9 Hyperlipidemia, unspecified hyperlipidemia type     ICD-10-CM: E78.5 ICD-9-CM: 272.4 Presence of stent in coronary artery in patient with coronary artery disease     ICD-10-CM: I25.10, Z95.5 ICD-9-CM: 414.01, V45.82 Vitals BP Pulse Temp Resp Height(growth percentile) Weight(growth percentile) 138/55 (BP 1 Location: Left arm, BP Patient Position: Sitting) (!) 48 97.4 °F (36.3 °C) (Oral) 18 5' 7\" (1.702 m) 187 lb (84.8 kg) SpO2 BMI Smoking Status 97% 29.29 kg/m2 Former Smoker BMI and BSA Data Body Mass Index Body Surface Area  
 29.29 kg/m 2 2 m 2 Preferred Pharmacy Pharmacy Name Phone [facility] [address] [place] [address] [facility] [phone] Your Updated Medication List  
  
   
This list is accurate as of 8/3/18  9:53 AM.  Always use your most recent med list.  
  
  
  
  
 atorvastatin 40 mg tablet Commonly known as:  LIPITOR Take 1 Tab by mouth daily. FISH OIL PO Take  by mouth two (2) times a day. losartan 100 mg tablet Commonly known as:  COZAAR Take 1 Tab by mouth daily. metoprolol succinate 50 mg XL tablet Commonly known as:  TOPROL-XL Take 50 mg by mouth every evening.  
  
 multivitamin tablet Commonly known as:  ONE A DAY Take 1 Tab by mouth daily (after dinner). NORVASC 5 mg tablet Generic drug:  amLODIPine Take 5 mg by mouth every morning. PLAVIX 75 mg Tab Generic drug:  clopidogrel Take 75 mg by mouth. Follow-up Instructions Return in about 3 months (around 11/3/2018) for routine follow up. Introducing [facility]! Dear Fernie: Thank you for requesting a Penumbra account. Our records indicate that you already have an active Penumbra account. You can access your account anytime at [email or web address] Did you know that you can access your hospital and ER discharge instructions at any time in Penumbra? You can also review all of your test results from your hospital stay or ER visit. Additional Information If you have questions, please visit the Frequently Asked Questions section of the Penumbra website at [email or web address] Remember, Penumbra is NOT to be used for urgent needs. For medical emergencies, dial 911. Now available from your iPhone and Android! Please provide this summary of care documentation to your next provider. Your primary care clinician is listed as Gissel Copeland. If you have any questions after today's visit, please call [phone].

## 2018-08-03 NOTE — PROGRESS NOTES
Subjective:  
 
Awais Dang is a 80 y.o. male who presents for follow up of hypertension, hyperlipidemia, coronary artery disease and status post coronary artery stenting. New concerns: No c/o boil resolved, stent placed 6/2017 Current Outpatient Prescriptions Medication Sig Dispense Refill  clopidogrel (PLAVIX) 75 mg tab Take 75 mg by mouth.  atorvastatin (LIPITOR) 40 mg tablet Take 1 Tab by mouth daily. 30 Tab 12  
 aspirin delayed-release 81 mg tablet Take  by mouth every morning.  metoprolol succinate (TOPROL-XL) 50 mg XL tablet Take 50 mg by mouth every evening.  amLODIPine (NORVASC) 5 mg tablet Take 5 mg by mouth every morning.  losartan (COZAAR) 100 mg tablet Take 1 Tab by mouth daily. (Patient taking differently: Take 100 mg by mouth every morning.) 90 Tab 0  
 DOCOSAHEXANOIC ACID/EPA (FISH OIL PO) Take  by mouth two (2) times a day.  multivitamin (ONE A DAY) tablet Take 1 Tab by mouth daily (after dinner). 90 Tab 3 No Known Allergies Diet and Lifestyle: nonsmoker Cardiovascular ROS: taking medications as instructed, no medication side effects noted, no TIA's, no chest pain on exertion, no dyspnea on exertion, no swelling of ankles. Review of Systems, additional: 
Pertinent items are noted in HPI. Patient Active Problem List  
 Diagnosis Date Noted  Mild asthma 11/03/2017  Presence of stent in coronary artery in patient with coronary artery disease 11/02/2017  History of throat cancer 06/24/2016  ACP (advance care planning) 12/18/2015  
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  
HDL Cholesterol 46 05/01/2018 10:40 AM  
LDL, calculated 58 05/01/2018 10:40 AM  
LDL-C, External 65 09/13/2017 Triglyceride 88 05/01/2018 10:40 AM  
 
Lab Results Component Value Date/Time GFR est non-AA 55 (L) 05/01/2018 10:40 AM  
GFR est AA 64 05/01/2018 10:40 AM  
Creatinine 1.19 05/01/2018 10:40 AM  
BUN 29 (H) 05/01/2018 10:40 AM  
Sodium 138 05/01/2018 10:40 AM  
Potassium 4.5 05/01/2018 10:40 AM  
Chloride 102 05/01/2018 10:40 AM  
CO2 21 05/01/2018 10:40 AM  
 
Lab Results Component Value Date/Time Glucose 99 05/01/2018 10:40 AM  
 Glucose (POC) 116 (H) 08/05/2014 08:59 PM  
   
 
 
 
Objective:  
 
Physical exam significant for the following:  
 
elderly Visit Vitals  /55 (BP 1 Location: Left arm, BP Patient Position: Sitting)  Pulse (!) 48  Temp 97.4 °F (36.3 °C) (Oral)  Resp 18  Ht 5' 7\" (1.702 m)  Wt 187 lb (84.8 kg)  SpO2 97%  BMI 29.29 kg/m2 Appearance: alert, well appearing, and in no distress. General exam: CVS exam BP noted to be well controlled today in office, S1, S2 normal, no gallop, no murmur, chest clear, no JVD, no HSM, no edema. . Neck boil well healed Assessment/Plan:  
 
hypertension stable, hyperlipidemia stable, coronary artery disease stable. Can stop aspirin ICD-10-CM ICD-9-CM 1. Essential hypertension I10 401.9 2. Hyperlipidemia, unspecified hyperlipidemia type E78.5 272.4 3. Presence of stent in coronary artery in patient with coronary artery disease I25.10 414.01   
 Z95.5 V45.82 HTN stable Boil recovered CAD stable Follow-up Disposition: 
Return in about 3 months (around 11/3/2018) for routine follow up.

## 2018-09-28 ENCOUNTER — HOSPITAL ENCOUNTER (OUTPATIENT)
Dept: GENERAL RADIOLOGY | Age: 83
Discharge: HOME | End: 2018-09-28
Payer: MEDICARE

## 2018-09-28 DIAGNOSIS — R52 PAIN: ICD-10-CM

## 2018-09-28 PROCEDURE — 71046 X-RAY EXAM CHEST 2 VIEWS: CPT

## 2018-11-20 ENCOUNTER — OFFICE VISIT (OUTPATIENT)
Dept: INTERNAL MEDICINE CLINIC | Age: 83
End: 2018-11-20

## 2018-11-20 VITALS
WEIGHT: 187 LBS | HEART RATE: 59 BPM | OXYGEN SATURATION: 96 % | SYSTOLIC BLOOD PRESSURE: 136 MMHG | TEMPERATURE: 96.7 F | BODY MASS INDEX: 29.35 KG/M2 | RESPIRATION RATE: 18 BRPM | HEIGHT: 67 IN | DIASTOLIC BLOOD PRESSURE: 56 MMHG

## 2018-11-20 DIAGNOSIS — I35.0 AORTIC VALVE STENOSIS, ETIOLOGY OF CARDIAC VALVE DISEASE UNSPECIFIED: ICD-10-CM

## 2018-11-20 DIAGNOSIS — I10 ESSENTIAL HYPERTENSION: Primary | ICD-10-CM

## 2018-11-20 DIAGNOSIS — E78.5 HYPERLIPIDEMIA, UNSPECIFIED HYPERLIPIDEMIA TYPE: ICD-10-CM

## 2018-11-20 DIAGNOSIS — Z95.5 PRESENCE OF STENT IN CORONARY ARTERY IN PATIENT WITH CORONARY ARTERY DISEASE: ICD-10-CM

## 2018-11-20 DIAGNOSIS — I25.10 PRESENCE OF STENT IN CORONARY ARTERY IN PATIENT WITH CORONARY ARTERY DISEASE: ICD-10-CM

## 2018-11-20 NOTE — PROGRESS NOTES
Chief Complaint Patient presents with  Hypertension I have reviewed the patient's medical history in detail and updated the computerized patient record. Health Maintenance reviewed. 1. Have you been to the ER, urgent care clinic since your last visit? Hospitalized since your last visit?no 2. Have you seen or consulted any other health care providers outside of the [address] [place] [address] [place] since your last visit? Include any pap smears or colon screening. No 
 
Pt stated he/she does have an Advance Directive Fall Risk Assessment, last 12 mths 11/20/2018 Able to walk? Yes Fall in past 12 months? Yes Fall with injury? Yes  
Number of falls in past 12 months 2 Fall Risk Score 3 PHQ over the last two weeks 11/20/2018 Little interest or pleasure in doing things Several days Feeling down, depressed, irritable, or hopeless Several days Total Score PHQ 2 2 Abuse Screening Questionnaire 5/1/2018 Do you ever feel afraid of your partner? Jose Petersen Are you in a relationship with someone who physically or mentally threatens you? Jose Petersen Is it safe for you to go home? Y  
 
 

## 2018-11-20 NOTE — PROGRESS NOTES
Subjective:  
 
Pao Vargas is a 80 y.o. male who presents for follow up of hypertension and hyperlipidemia. New concerns: feels well was sent to PM but tramadol did not help. SOme knee arthritis and N/T in fingers toes. Current Outpatient Medications Medication Sig Dispense Refill  clopidogrel (PLAVIX) 75 mg tab Take 75 mg by mouth.  atorvastatin (LIPITOR) 40 mg tablet Take 1 Tab by mouth daily. 30 Tab 12  
 metoprolol succinate (TOPROL-XL) 50 mg XL tablet Take 50 mg by mouth every evening.  amLODIPine (NORVASC) 5 mg tablet Take 5 mg by mouth every morning.  losartan (COZAAR) 100 mg tablet Take 1 Tab by mouth daily. (Patient taking differently: Take 100 mg by mouth every morning.) 90 Tab 0  
 DOCOSAHEXANOIC ACID/EPA (FISH OIL PO) Take  by mouth two (2) times a day.  multivitamin (ONE A DAY) tablet Take 1 Tab by mouth daily (after dinner). 90 Tab 3 No Known Allergies Diet and Lifestyle: nonsmoker Cardiovascular ROS: taking medications as instructed, no medication side effects noted, no TIA's, no chest pain on exertion, no dyspnea on exertion, no swelling of ankles. Review of Systems, additional: 
Pertinent items are noted in HPI. No syncope Patient Active Problem List  
 Diagnosis Date Noted  Mild asthma 11/03/2017  Presence of stent in coronary artery in patient with coronary artery disease 11/02/2017  History of throat cancer 06/24/2016  ACP (advance care planning) 12/18/2015  
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  Aortic stenosis 01/03/2014  Hypertension 08/01/2013  Hyperlipidemia 08/01/2013  DDD (degenerative disc disease), lumbar 08/01/2013  BPH (benign prostatic hyperplasia) 08/01/2013 Social History Tobacco Use  Smoking status: Former Smoker Packs/day: 1.00 Years: 26.00 Pack years: 26.00 Types: Cigarettes Last attempt to quit: 1/1/1976 Years since quittin.9  Smokeless tobacco: Never Used Substance Use Topics  Alcohol use: Yes Alcohol/week: 0.6 oz Types: 1 Shots of liquor per week Comment: 4-5x week burbon in afternoon Lab Results Component Value Date/Time Cholesterol, total 122 2018 10:40 AM  
 HDL Cholesterol 46 2018 10:40 AM  
 LDL, calculated 58 2018 10:40 AM  
 LDL-C, External 65 2017 Triglyceride 88 2018 10:40 AM  
 
Lab Results Component Value Date/Time ALT (SGPT) 22 2015 09:49 AM  
 AST (SGOT) 26 2015 09:49 AM  
 Alk. phosphatase 103 2015 09:49 AM  
 Bilirubin, total 0.3 2015 09:49 AM  
 Albumin 4.3 2015 09:49 AM  
 Protein, total 6.7 2015 09:49 AM  
 INR 1.0 2014 04:00 PM  
 Prothrombin time 10.0 2014 04:00 PM  
 PLATELET 861 47/10/0752 12:00 PM  
 
Lab Results Component Value Date/Time GFR est non-AA 55 (L) 2018 10:40 AM  
 GFR est AA 64 2018 10:40 AM  
 Creatinine 1.19 2018 10:40 AM  
 BUN 29 (H) 2018 10:40 AM  
 Sodium 138 2018 10:40 AM  
 Potassium 4.5 2018 10:40 AM  
 Chloride 102 2018 10:40 AM  
 CO2 21 2018 10:40 AM  
 
Lab Results Component Value Date/Time Glucose 99 2018 10:40 AM  
 Glucose (POC) 116 (H) 2014 08:59 PM  
   
 
 
 
Objective:  
 
Physical exam significant for the following: WNL elderly with cane Visit Vitals /56 (BP 1 Location: Left arm, BP Patient Position: Sitting) Pulse (!) 59 Temp 96.7 °F (35.9 °C) (Oral) Resp 18 Ht 5' 7\" (1.702 m) Wt 187 lb (84.8 kg) SpO2 96% BMI 29.29 kg/m² Appearance: alert, well appearing, and in no distress. General exam: CVS exam BP noted to be well controlled today in office, S1, S2 normal, no gallop, no murmur, chest clear, no JVD, no HSM, no edema. .  
Assessment/Plan:  
 
hypertension stable. ICD-10-CM ICD-9-CM 1. Essential hypertension I10 401.9 2. Hyperlipidemia, unspecified hyperlipidemia type E78.5 272.4 3. Aortic valve stenosis, etiology of cardiac valve disease unspecified I35.0 424.1 4. Presence of stent in coronary artery in patient with coronary artery disease I25.10 414.01   
 Z95.5 V45.82 Chronic Conditions Addressed Today 1. Hypertension - Primary 2. Hyperlipidemia 3. Aortic stenosis 4. Presence of stent in coronary artery in patient with coronary artery disease Above issues stable. Follow-up Disposition: 
Return in about 4 months (around 3/20/2019) for routine follow up.

## 2018-11-26 ENCOUNTER — HOSPITAL ENCOUNTER (INPATIENT)
Age: 83
LOS: 3 days | Discharge: NURSING, REHAB OR LONG-TERM CARE | DRG: 203 | End: 2018-11-29
Attending: EMERGENCY MEDICINE | Admitting: INTERNAL MEDICINE
Payer: MEDICARE

## 2018-11-26 ENCOUNTER — APPOINTMENT (OUTPATIENT)
Dept: CT IMAGING | Age: 83
DRG: 203 | End: 2018-11-26
Attending: EMERGENCY MEDICINE
Payer: MEDICARE

## 2018-11-26 ENCOUNTER — APPOINTMENT (OUTPATIENT)
Dept: GENERAL RADIOLOGY | Age: 83
DRG: 203 | End: 2018-11-26
Attending: EMERGENCY MEDICINE
Payer: MEDICARE

## 2018-11-26 DIAGNOSIS — R10.32 ABDOMINAL PAIN, LLQ (LEFT LOWER QUADRANT): ICD-10-CM

## 2018-11-26 DIAGNOSIS — R06.09 DYSPNEA ON EXERTION: ICD-10-CM

## 2018-11-26 DIAGNOSIS — J18.9 PNEUMONIA OF BOTH LOWER LOBES DUE TO INFECTIOUS ORGANISM: Primary | ICD-10-CM

## 2018-11-26 PROBLEM — R05.9 COUGH: Status: RESOLVED | Noted: 2018-11-26 | Resolved: 2018-11-26

## 2018-11-26 PROBLEM — R05.9 COUGH: Status: ACTIVE | Noted: 2018-11-26

## 2018-11-26 PROBLEM — J20.9 ACUTE BRONCHITIS: Status: ACTIVE | Noted: 2018-11-26

## 2018-11-26 LAB
ALBUMIN SERPL-MCNC: 3.4 G/DL (ref 3.5–5)
ALBUMIN/GLOB SERPL: 0.9 {RATIO} (ref 1.1–2.2)
ALP SERPL-CCNC: 119 U/L (ref 45–117)
ALT SERPL-CCNC: 29 U/L (ref 12–78)
ANION GAP SERPL CALC-SCNC: 12 MMOL/L (ref 5–15)
APPEARANCE UR: CLEAR
AST SERPL-CCNC: 24 U/L (ref 15–37)
BACTERIA URNS QL MICRO: NEGATIVE /HPF
BASOPHILS # BLD: 0.1 K/UL (ref 0–0.1)
BASOPHILS NFR BLD: 1 % (ref 0–1)
BILIRUB SERPL-MCNC: 0.3 MG/DL (ref 0.2–1)
BILIRUB UR QL: NEGATIVE
BNP SERPL-MCNC: 2313 PG/ML (ref 0–450)
BUN SERPL-MCNC: 27 MG/DL (ref 6–20)
BUN/CREAT SERPL: 21 (ref 12–20)
CALCIUM SERPL-MCNC: 9.2 MG/DL (ref 8.5–10.1)
CHLORIDE SERPL-SCNC: 104 MMOL/L (ref 97–108)
CK MB CFR SERPL CALC: 6.2 % (ref 0–2.5)
CK MB SERPL-MCNC: 4.1 NG/ML (ref 5–25)
CK SERPL-CCNC: 57 U/L (ref 39–308)
CK SERPL-CCNC: 66 U/L (ref 39–308)
CO2 SERPL-SCNC: 22 MMOL/L (ref 21–32)
COLOR UR: NORMAL
CREAT SERPL-MCNC: 1.3 MG/DL (ref 0.7–1.3)
DIFFERENTIAL METHOD BLD: NORMAL
EOSINOPHIL # BLD: 0.1 K/UL (ref 0–0.4)
EOSINOPHIL NFR BLD: 2 % (ref 0–7)
EPITH CASTS URNS QL MICRO: NORMAL /LPF
ERYTHROCYTE [DISTWIDTH] IN BLOOD BY AUTOMATED COUNT: 13.1 % (ref 11.5–14.5)
GLOBULIN SER CALC-MCNC: 3.8 G/DL (ref 2–4)
GLUCOSE SERPL-MCNC: 145 MG/DL (ref 65–100)
GLUCOSE UR STRIP.AUTO-MCNC: NEGATIVE MG/DL
HCT VFR BLD AUTO: 38.3 % (ref 36.6–50.3)
HGB BLD-MCNC: 12.7 G/DL (ref 12.1–17)
HGB UR QL STRIP: NEGATIVE
HYALINE CASTS URNS QL MICRO: NORMAL /LPF (ref 0–5)
IMM GRANULOCYTES # BLD: 0 K/UL (ref 0–0.04)
IMM GRANULOCYTES NFR BLD AUTO: 0 % (ref 0–0.5)
KETONES UR QL STRIP.AUTO: NEGATIVE MG/DL
LACTATE BLD-SCNC: 1.04 MMOL/L (ref 0.4–2)
LEUKOCYTE ESTERASE UR QL STRIP.AUTO: NEGATIVE
LIPASE SERPL-CCNC: 213 U/L (ref 73–393)
LYMPHOCYTES # BLD: 1.9 K/UL (ref 0.8–3.5)
LYMPHOCYTES NFR BLD: 27 % (ref 12–49)
MAGNESIUM SERPL-MCNC: 2 MG/DL (ref 1.6–2.4)
MCH RBC QN AUTO: 30.2 PG (ref 26–34)
MCHC RBC AUTO-ENTMCNC: 33.2 G/DL (ref 30–36.5)
MCV RBC AUTO: 91 FL (ref 80–99)
MONOCYTES # BLD: 0.5 K/UL (ref 0–1)
MONOCYTES NFR BLD: 7 % (ref 5–13)
NEUTS SEG # BLD: 4.5 K/UL (ref 1.8–8)
NEUTS SEG NFR BLD: 63 % (ref 32–75)
NITRITE UR QL STRIP.AUTO: NEGATIVE
NRBC # BLD: 0 K/UL (ref 0–0.01)
NRBC BLD-RTO: 0 PER 100 WBC
PH UR STRIP: 6 [PH] (ref 5–8)
PLATELET # BLD AUTO: 273 K/UL (ref 150–400)
PMV BLD AUTO: 10.3 FL (ref 8.9–12.9)
POTASSIUM SERPL-SCNC: 4.5 MMOL/L (ref 3.5–5.1)
PROT SERPL-MCNC: 7.2 G/DL (ref 6.4–8.2)
PROT UR STRIP-MCNC: NEGATIVE MG/DL
RBC # BLD AUTO: 4.21 M/UL (ref 4.1–5.7)
RBC #/AREA URNS HPF: NORMAL /HPF (ref 0–5)
SODIUM SERPL-SCNC: 138 MMOL/L (ref 136–145)
SP GR UR REFRACTOMETRY: 1.01 (ref 1–1.03)
TROPONIN I SERPL-MCNC: <0.05 NG/ML
TROPONIN I SERPL-MCNC: <0.05 NG/ML
TSH SERPL DL<=0.05 MIU/L-ACNC: 3.34 UIU/ML (ref 0.36–3.74)
UA: UC IF INDICATED,UAUC: NORMAL
UROBILINOGEN UR QL STRIP.AUTO: 0.2 EU/DL (ref 0.2–1)
WBC # BLD AUTO: 7.1 K/UL (ref 4.1–11.1)
WBC URNS QL MICRO: NORMAL /HPF (ref 0–4)

## 2018-11-26 PROCEDURE — 36415 COLL VENOUS BLD VENIPUNCTURE: CPT

## 2018-11-26 PROCEDURE — 74011250637 HC RX REV CODE- 250/637: Performed by: INTERNAL MEDICINE

## 2018-11-26 PROCEDURE — 84443 ASSAY THYROID STIM HORMONE: CPT

## 2018-11-26 PROCEDURE — 83605 ASSAY OF LACTIC ACID: CPT

## 2018-11-26 PROCEDURE — 81001 URINALYSIS AUTO W/SCOPE: CPT

## 2018-11-26 PROCEDURE — 87040 BLOOD CULTURE FOR BACTERIA: CPT

## 2018-11-26 PROCEDURE — 83690 ASSAY OF LIPASE: CPT

## 2018-11-26 PROCEDURE — 74176 CT ABD & PELVIS W/O CONTRAST: CPT

## 2018-11-26 PROCEDURE — 85025 COMPLETE CBC W/AUTO DIFF WBC: CPT

## 2018-11-26 PROCEDURE — 71046 X-RAY EXAM CHEST 2 VIEWS: CPT

## 2018-11-26 PROCEDURE — 96361 HYDRATE IV INFUSION ADD-ON: CPT

## 2018-11-26 PROCEDURE — 83735 ASSAY OF MAGNESIUM: CPT

## 2018-11-26 PROCEDURE — 96375 TX/PRO/DX INJ NEW DRUG ADDON: CPT

## 2018-11-26 PROCEDURE — 74011250636 HC RX REV CODE- 250/636: Performed by: INTERNAL MEDICINE

## 2018-11-26 PROCEDURE — 65270000029 HC RM PRIVATE

## 2018-11-26 PROCEDURE — 80053 COMPREHEN METABOLIC PANEL: CPT

## 2018-11-26 PROCEDURE — 99285 EMERGENCY DEPT VISIT HI MDM: CPT

## 2018-11-26 PROCEDURE — 93005 ELECTROCARDIOGRAM TRACING: CPT

## 2018-11-26 PROCEDURE — 74011000250 HC RX REV CODE- 250: Performed by: EMERGENCY MEDICINE

## 2018-11-26 PROCEDURE — 83880 ASSAY OF NATRIURETIC PEPTIDE: CPT

## 2018-11-26 PROCEDURE — 82553 CREATINE MB FRACTION: CPT

## 2018-11-26 PROCEDURE — 84484 ASSAY OF TROPONIN QUANT: CPT

## 2018-11-26 PROCEDURE — 74011250636 HC RX REV CODE- 250/636: Performed by: EMERGENCY MEDICINE

## 2018-11-26 PROCEDURE — 96374 THER/PROPH/DIAG INJ IV PUSH: CPT

## 2018-11-26 PROCEDURE — 87070 CULTURE OTHR SPECIMN AEROBIC: CPT

## 2018-11-26 RX ORDER — LOSARTAN POTASSIUM 50 MG/1
100 TABLET ORAL DAILY
Status: DISCONTINUED | OUTPATIENT
Start: 2018-11-27 | End: 2018-11-29 | Stop reason: HOSPADM

## 2018-11-26 RX ORDER — ASPIRIN 81 MG/1
81 TABLET ORAL DAILY
Status: DISCONTINUED | OUTPATIENT
Start: 2018-11-27 | End: 2018-11-29 | Stop reason: HOSPADM

## 2018-11-26 RX ORDER — THERA TABS 400 MCG
1 TAB ORAL
Status: DISCONTINUED | OUTPATIENT
Start: 2018-11-26 | End: 2018-11-29 | Stop reason: HOSPADM

## 2018-11-26 RX ORDER — ONDANSETRON 2 MG/ML
4 INJECTION INTRAMUSCULAR; INTRAVENOUS
Status: COMPLETED | OUTPATIENT
Start: 2018-11-26 | End: 2018-11-26

## 2018-11-26 RX ORDER — AMLODIPINE BESYLATE 5 MG/1
5 TABLET ORAL
Status: DISCONTINUED | OUTPATIENT
Start: 2018-11-27 | End: 2018-11-29 | Stop reason: HOSPADM

## 2018-11-26 RX ORDER — CLOPIDOGREL BISULFATE 75 MG/1
75 TABLET ORAL DAILY
Status: DISCONTINUED | OUTPATIENT
Start: 2018-11-27 | End: 2018-11-29 | Stop reason: HOSPADM

## 2018-11-26 RX ORDER — ATORVASTATIN CALCIUM 40 MG/1
40 TABLET, FILM COATED ORAL EVERY EVENING
COMMUNITY
End: 2021-01-01

## 2018-11-26 RX ORDER — METOPROLOL SUCCINATE 50 MG/1
50 TABLET, EXTENDED RELEASE ORAL EVERY EVENING
Status: DISCONTINUED | OUTPATIENT
Start: 2018-11-26 | End: 2018-11-29 | Stop reason: HOSPADM

## 2018-11-26 RX ORDER — ATORVASTATIN CALCIUM 10 MG/1
40 TABLET, FILM COATED ORAL EVERY EVENING
Status: DISCONTINUED | OUTPATIENT
Start: 2018-11-26 | End: 2018-11-29 | Stop reason: HOSPADM

## 2018-11-26 RX ORDER — GUAIFENESIN 600 MG/1
600 TABLET, EXTENDED RELEASE ORAL EVERY 12 HOURS
Status: DISCONTINUED | OUTPATIENT
Start: 2018-11-26 | End: 2018-11-29 | Stop reason: HOSPADM

## 2018-11-26 RX ORDER — SODIUM CHLORIDE 0.9 % (FLUSH) 0.9 %
5-10 SYRINGE (ML) INJECTION EVERY 8 HOURS
Status: DISCONTINUED | OUTPATIENT
Start: 2018-11-26 | End: 2018-11-29 | Stop reason: HOSPADM

## 2018-11-26 RX ORDER — ASPIRIN 81 MG/1
81 TABLET ORAL DAILY
COMMUNITY

## 2018-11-26 RX ORDER — SODIUM CHLORIDE 0.9 % (FLUSH) 0.9 %
5-10 SYRINGE (ML) INJECTION AS NEEDED
Status: DISCONTINUED | OUTPATIENT
Start: 2018-11-26 | End: 2018-11-29 | Stop reason: HOSPADM

## 2018-11-26 RX ORDER — ACETAMINOPHEN 325 MG/1
650 TABLET ORAL
Status: DISCONTINUED | OUTPATIENT
Start: 2018-11-26 | End: 2018-11-29 | Stop reason: HOSPADM

## 2018-11-26 RX ORDER — NALOXONE HYDROCHLORIDE 0.4 MG/ML
0.4 INJECTION, SOLUTION INTRAMUSCULAR; INTRAVENOUS; SUBCUTANEOUS AS NEEDED
Status: DISCONTINUED | OUTPATIENT
Start: 2018-11-26 | End: 2018-11-29 | Stop reason: HOSPADM

## 2018-11-26 RX ORDER — ACETAMINOPHEN 500 MG
1000 TABLET ORAL
COMMUNITY

## 2018-11-26 RX ORDER — ALBUTEROL SULFATE 1.25 MG/3ML
1.25 SOLUTION RESPIRATORY (INHALATION)
Status: DISCONTINUED | OUTPATIENT
Start: 2018-11-26 | End: 2018-11-29 | Stop reason: HOSPADM

## 2018-11-26 RX ORDER — ENOXAPARIN SODIUM 100 MG/ML
40 INJECTION SUBCUTANEOUS EVERY 24 HOURS
Status: DISCONTINUED | OUTPATIENT
Start: 2018-11-26 | End: 2018-11-29 | Stop reason: HOSPADM

## 2018-11-26 RX ORDER — ONDANSETRON 2 MG/ML
4 INJECTION INTRAMUSCULAR; INTRAVENOUS
Status: DISCONTINUED | OUTPATIENT
Start: 2018-11-26 | End: 2018-11-29 | Stop reason: HOSPADM

## 2018-11-26 RX ADMIN — Medication 10 ML: at 23:03

## 2018-11-26 RX ADMIN — THERA TABS 1 TABLET: TAB at 18:15

## 2018-11-26 RX ADMIN — ENOXAPARIN SODIUM 40 MG: 40 INJECTION SUBCUTANEOUS at 14:55

## 2018-11-26 RX ADMIN — FAMOTIDINE 20 MG: 10 INJECTION, SOLUTION INTRAVENOUS at 13:08

## 2018-11-26 RX ADMIN — ONDANSETRON 4 MG: 2 INJECTION INTRAMUSCULAR; INTRAVENOUS at 13:08

## 2018-11-26 RX ADMIN — SODIUM CHLORIDE 1000 ML: 900 INJECTION, SOLUTION INTRAVENOUS at 13:08

## 2018-11-26 RX ADMIN — ATORVASTATIN CALCIUM 40 MG: 10 TABLET, FILM COATED ORAL at 18:14

## 2018-11-26 RX ADMIN — CEFTRIAXONE SODIUM 1 G: 1 INJECTION, POWDER, FOR SOLUTION INTRAMUSCULAR; INTRAVENOUS at 14:37

## 2018-11-26 RX ADMIN — METOPROLOL SUCCINATE 50 MG: 50 TABLET, EXTENDED RELEASE ORAL at 18:14

## 2018-11-26 RX ADMIN — GUAIFENESIN 600 MG: 600 TABLET, EXTENDED RELEASE ORAL at 14:54

## 2018-11-26 NOTE — H&P
[address] 
[phone] Admission History and Physical 
 
 
NAME:  Darell Castano :   1931 MRN:  [id number] PCP:  Fatmata Brar MD  
 
Date/Time:  2018 Subjective: CHIEF COMPLAINT: \"I have a cough and I am weak\" HISTORY OF PRESENT ILLNESS:    
Mr. Mo Patricia is a 80 y.o.  male with PMH of HTN, XOL, CAD, AS admitted for pneumonia. Per pt, has had generalized malaise, weakness, cough productive of yellow sputum for several days. Also reports AVILA. Denies exertional chest pain or N/V/diaphoresis. Past Medical History:  
Diagnosis Date  BPH (benign prostatic hyperplasia)  CKD (chronic kidney disease) 5/28/15 pt denies kidney disease  DDD (degenerative disc disease), lumbar  Elevated PSA  Heart murmur Dr Supa Oh  Hoarse   
 x6 months as of 5/28/15; being evaluated by Dr Efren Vines  Hyperlipidemia  Hypertension Dr Lanette Castellano  Osteoarthritis, shoulder  Positive PPD, treated   
 treated with INH  Sensorineural hearing loss   
 as of 5/28/15 pt wears hearing aids  Unspecified adverse effect of anesthesia 1960s  
 delayed awaking Past Surgical History:  
Procedure Laterality Date  HX CATARACT REMOVAL Bilateral   HX COLONOSCOPY   2 [facility]  HX INTRAOCULAR LENS PROSTHESIS    
 bilateral  
 HX LUMBAR LAMINECTOMY   [facility] Dr Jenniffer Johnson  HX MOHS PROCEDURES  prior to   
 and bicep repair  TOTAL KNEE ARTHROPLASTY Right 14 Social History Tobacco Use  Smoking status: Former Smoker Packs/day: 1.00 Years: 26.00 Pack years: 26.00 Types: Cigarettes Last attempt to quit: 1976 Years since quittin.9  Smokeless tobacco: Never Used Substance Use Topics  Alcohol use: Yes Alcohol/week: 0.6 oz Types: 1 Shots of liquor per week Comment: 4-5x week burbon in afternoon Family History Problem Relation Age of Onset  Cancer Father   
     lung  Stroke Mother  Diabetes Mother  Diabetes Sister  Cancer Sister No Known Allergies Prior to Admission medications Medication Sig Start Date End Date Taking? Authorizing Provider  
atorvastatin (LIPITOR) 40 mg tablet Take 40 mg by mouth every evening. Yes Provider, Historical  
aspirin delayed-release 81 mg tablet Take 81 mg by mouth daily. Yes Provider, Historical  
acetaminophen (TYLENOL) 500 mg tablet Take 1,000 mg by mouth two (2) times daily as needed for Pain. Yes Provider, Historical  
clopidogrel (PLAVIX) 75 mg tab Take 75 mg by mouth daily. Yes Provider, Historical  
metoprolol succinate (TOPROL-XL) 50 mg XL tablet Take 50 mg by mouth every evening. Yes Provider, Historical  
amLODIPine (NORVASC) 5 mg tablet Take 5 mg by mouth every morning. Yes Provider, Historical  
losartan (COZAAR) 100 mg tablet Take 1 Tab by mouth daily. 2/27/14  Yes Angie Watters MD  
DOCOSAHEXANOIC ACID/EPA (FISH OIL PO) Take 1 Cap by mouth two (2) times a day. Yes Provider, Historical  
multivitamin (ONE A DAY) tablet Take 1 Tab by mouth daily (after dinner). 12/19/13  Yes Angie Watters MD  
 
 
 
Review of Systems: 
(bold if positive, if negative) Gen:  Eyes:  ENT:  CVS:  Pulm:  GI:   
:   
MS:  Skin:  Psych:  Endo:   
Hem:  Renal:   
Neuro:    
Cough, sputum, dyspnea on exertion Objective: VITALS:   
Vital signs reviewed; most recent are: 
 
Visit Vitals /69 Pulse 76 Temp 98.4 °F (36.9 °C) Resp 15 Ht 5' 7\" (1.702 m) Wt 83.5 kg (184 lb) SpO2 93% BMI 28.82 kg/m² SpO2 Readings from Last 6 Encounters:  
11/26/18 93% 11/20/18 96% 08/03/18 97% 08/01/18 98% 07/16/18 96% 05/14/18 95% No intake or output data in the 24 hours ending 11/26/18 5892 Exam:  
 
Physical Exam: Gen:  Well-developed, well-nourished, in no acute distress HEENT:  Pink conjunctivae, PERRL, hearing intact to voice, moist mucous membranes Neck:  Supple, without masses, thyroid non-tender Resp: Bibasilar crackles Card:  No murmurs, normal S1, S2 without thrills, bruits or peripheral edema Abd:  Soft, non-tender, non-distended, normoactive bowel sounds are present, no palpable organomegaly Lymph:  No cervical adenopathy Musc:  No cyanosis or clubbing Skin:  No rashes or ulcers, skin turgor is good Neuro:  Cranial nerves 3-12 are grossly intact,  strength is 5/5 bilaterally, dorsi / plantarflexion strength is 5/5 bilaterally, follows commands appropriately Psych:  Alert with good insight. Oriented to person, place, and time Labs: 
 
Recent Labs  
  11/26/18 
1153 WBC 7.1 HGB 12.7 HCT 38.3  Recent Labs  
  11/26/18 
1153   
K 4.5  
 CO2 22 * BUN 27* CREA 1.30 CA 9.2 MG 2.0 ALB 3.4* SGOT 24 ALT 29 No components found for: [place] No results for input(s): PH, PCO2, PO2, HCO3, FIO2 in the last 72 hours. No results for input(s): INR in the last 72 hours. No lab exists for component: INREXT 
 
CXR => bibasilar infiltrates Assessment/Plan:   
CAP - though WBC WNL and no L shift, pt does report productive cough, generalized malaise and fatigue 
-continue Ceftriaxone, azithro BPH  
-not on meds; monitor for retention Aortic stenosis - per pt, follows regularly with cardiology  
-strict I's and O's  
-cardiology eval; pt may need repeat TTE to eval for progression of AS  
 
HTN  
-continue metoprolol, amlodipine, ARB as BP's allow XOL  
-continue statin CAD  
-continue ASA, statin, beta blocker  
-trend CE's  
-does report AVILA; defer to cardiology whether a stress is needed Surrogate decision maker: Daughter Total time spent with patient: 70 Minutes Care Plan discussed with: Patient and Family Discussed:  Code Status, Care Plan and D/C Planning Prophylaxis:  Lovenox Probable Disposition:  Home w/Family 
        
___________________________________________________ Attending Physician: Kiley Ball MD

## 2018-11-26 NOTE — ED NOTES
ED EKG interpretation: 
Rhythm: sinus bradycardia; and regular . Rate (approx.): 59; Axis: normal; P wave: normal; QRS interval: prolonged; ST/T wave: non-specific changes; RBBB EKG documented by Meseret Foster MD

## 2018-11-26 NOTE — ED TRIAGE NOTES
Pt presents with family, pt reports nausea. Family reports the pt has had generalized weakness, productive cough, and loose bowel movements for approximately 1 week.

## 2018-11-26 NOTE — PROGRESS NOTES
11/26/2018 
6:40 PM 
Case management note Met with patient to discuss planning. Confirmed demographics. Patient lives alone in a single story home with 5 steps to enter. He drives and is independent with ADL's. [facility] in [place] [address] for RX needs Dr. Mino Serrato for medical managnement, nn notified. Daughter can transport on discharge. Reason for Admission:   cough RRAT Score:          8 Plan for utilizing home health: Would benefit from [facility] for skilled nursing, pt and ot Likelihood of Readmission:  Low/green Transition of Care Plan:    Home with family assistance, HH and out patient follow up Care Management Interventions PCP Verified by CM: Yes(dr. castillo nn notified) Mode of Transport at Discharge: Self Transition of Care Consult (CM Consult): Discharge Planning Current Support Network: Lives Alone Confirm Follow Up Transport: Family Plan discussed with Pt/Family/Caregiver: Yes Discharge Location Discharge Placement: Unable to determine at this time Shira Cruz, [address]

## 2018-11-26 NOTE — PROGRESS NOTES
Spoke with Dr. Gertrude Villegas. CT ab/pelvis lung bases clear of infectious process. Does not feel that x-ray is c/w infection. Due to productive cough, it is very well possible that this bronchitis, but no e.o PNA

## 2018-11-26 NOTE — PROGRESS NOTES
[facility]: MED RECONCILIATION Comments/Recommendations:  
Interview with patient and family at the bedside. Blood pressure monitored at home and is usually 140s/60s Denies recent changes to home medications Preferred pharmacy updated. Reports compliance to prescribed regimen Medications added: · Aspirin · Acetaminophen Medications removed: 
 
· None Medications adjusted: 
 
· None Allergies: Patient has no known allergies. Prior to Admission Medications:  
Prior to Admission Medications Prescriptions Last Dose Informant Patient Reported? Taking? DOCOSAHEXANOIC ACID/EPA (FISH OIL PO) 11/26/2018 at Unknown time Self Yes Yes Sig: Take 1 Cap by mouth two (2) times a day. acetaminophen (TYLENOL) 500 mg tablet 11/25/2018 at Unknown time Self Yes Yes Sig: Take 1,000 mg by mouth two (2) times daily as needed for Pain. amLODIPine (NORVASC) 5 mg tablet 11/26/2018 at am Self Yes Yes Sig: Take 5 mg by mouth every morning. aspirin delayed-release 81 mg tablet 11/26/2018 at Unknown time Self Yes Yes Sig: Take 81 mg by mouth daily. atorvastatin (LIPITOR) 40 mg tablet 11/25/2018 at Unknown time Self Yes Yes Sig: Take 40 mg by mouth every evening. clopidogrel (PLAVIX) 75 mg tab 11/26/2018 at Unknown time Self Yes Yes Sig: Take 75 mg by mouth daily. losartan (COZAAR) 100 mg tablet 11/26/2018 at am Self No Yes Sig: Take 1 Tab by mouth daily. metoprolol succinate (TOPROL-XL) 50 mg XL tablet 11/25/2018 at pm Self Yes Yes Sig: Take 50 mg by mouth every evening.  
multivitamin (ONE A DAY) tablet 11/25/2018 at pm Self Yes Yes Sig: Take 1 Tab by mouth daily (after dinner). Facility-Administered Medications: None Thank you, Graeme Mathews, PharmD, BCPS

## 2018-11-26 NOTE — ED PROVIDER NOTES
80 y.o. male with extensive past medical history, please see list, significant for HTN, HLD, CKD, BPH, DDD, OA, murmur, who presents ambulatory with family members to the ED with cc of abdominal pain. Pt reports LLQ abdominal pain x 4 days, with associated nausea and diarrhea. He states he has had \"loose bowels\" without blood in stool. He also reports cough productive of yellow sputum. He states he had coffee and a pastry for breakfast without any difficulty swallowing. Pt notes he lives alone, but has been staying with his daughter for the past 5 days. He notes he had a stent placed last year. Pt specifically denies any chest pain, blood in stool, sleep change, vomiting, fever, or pain swallowing. There are no other acute medical concerns at this time. Social Hx: former Tobacco use, yes EtOH use, denies Illicit Drug use PCP: Black Cason MD 
 
Note written by Jeff Chan, as dictated by Anahy Real NP 12:11 PM  
 
 
 
The history is provided by the patient. No  was used. 11:48 AM 
I have evaluated the patient as the Provider in Triage. I have reviewed His vital signs and the triage nurse assessment. I have talked with the patient and any available family and advised that I am the provider in triage and have ordered the appropriate study to initiate their work up based on the clinical presentation during my assessment. I have advised that the patient will be accommodated in the Main ED as soon as possible. I have also requested to contact the triage nurse or myself immediately if the patient experiences any changes in their condition during this brief waiting period. Cough, weakness and loose stools x 1 week. No fever. Has chronic leg swelling, no different today. Eleno Tovar MD 
 
 
Past Medical History:  
Diagnosis Date  BPH (benign prostatic hyperplasia)  CKD (chronic kidney disease) 5/28/15 pt denies kidney disease  DDD (degenerative disc disease), lumbar  Elevated PSA  Heart murmur Dr Rochelle Harrison  Hoarse   
 x6 months as of 5/28/15; being evaluated by Dr Jyotsna Mendez  Hyperlipidemia  Hypertension Dr [place]  Osteoarthritis, shoulder  Positive PPD, treated   
 treated with INH  Sensorineural hearing loss   
 as of 5/28/15 pt wears hearing aids  Unspecified adverse effect of anesthesia 1960s  
 delayed awaking Past Surgical History:  
Procedure Laterality Date  HX CATARACT REMOVAL Bilateral 2004  HX COLONOSCOPY  2006 [address]  HX INTRAOCULAR LENS PROSTHESIS    
 bilateral  
 HX LUMBAR LAMINECTOMY   [facility]  HX MOHS PROCEDURES  prior to   
 and bicep repair  TOTAL KNEE ARTHROPLASTY Right 14 Family History:  
Problem Relation Age of Onset  Cancer Father   
     lung  Stroke Mother  Diabetes Mother  Diabetes Sister  Cancer Sister Social History Socioeconomic History  Marital status:  Spouse name: Not on file  Number of children: 1  Years of education: Not on file  Highest education level: Not on file Social Needs  Financial resource strain: Not on file  Food insecurity - worry: Not on file  Food insecurity - inability: Not on file  Transportation needs - medical: Not on file  Transportation needs - non-medical: Not on file Occupational History  Occupation: retired Tobacco Use  Smoking status: Former Smoker Packs/day: 1.00 Years: 26.00 Pack years: 26.00 Types: Cigarettes Last attempt to quit: 1976 Years since quittin.9  Smokeless tobacco: Never Used Substance and Sexual Activity  Alcohol use: Yes Alcohol/week: 0.6 oz Types: 1 Shots of liquor per week Comment: 4-5x week burbon in afternoon  Drug use: No  
 Sexual activity: No  
Other Topics Concern  Not on file Social History Narrative  lives on his own ALLERGIES: Patient has no known allergies. Review of Systems Constitutional: Negative for fever. HENT: Negative for trouble swallowing. Respiratory: Positive for cough. Cardiovascular: Positive for leg swelling. Negative for chest pain. Chronic bilateral lower leg swelling Gastrointestinal: Positive for abdominal pain, diarrhea and nausea. Negative for vomiting. Skin: Negative for rash. Psychiatric/Behavioral: Negative for sleep disturbance. Vitals:  
 11/26/18 1149 BP: 137/63 Pulse: 62 Resp: 18 Temp: 98.4 °F (36.9 °C) SpO2: 95% Weight: 83.5 kg (184 lb) Height: 5' 7\" (1.702 m) Physical Exam  
Constitutional: He is oriented to person, place, and time. He appears well-developed and well-nourished. Elderly white male; lives alone; non smoker HENT:  
Right Ear: External ear normal.  
Left Ear: External ear normal.  
Nose: Nose normal.  
Mouth/Throat: Oropharynx is clear and moist.  
Wears a hearing aide Neck: Normal range of motion. Neck supple. Cardiovascular: Normal rate and regular rhythm. Pulmonary/Chest: Effort normal and breath sounds normal.  
Abdominal: Soft. Bowel sounds are normal. He exhibits no distension and no mass. There is tenderness. There is no rebound and no guarding. No hernia. Reports left lower abdomen tenderness Musculoskeletal: Normal range of motion. He exhibits edema. Chronic bilateral lower leg edema; symmetrical;Skin integrity is intact. There is no obvious bony deformity. Good neurovascular sensation. No apparent tendon or nerve injury. Lymphadenopathy:  
  He has no cervical adenopathy. Neurological: He is alert and oriented to person, place, and time. Skin: Skin is warm and dry. No rash noted. Psychiatric: He has a normal mood and affect. Nursing note and vitals reviewed. MDM Procedures Pt ambulated to the bathroom and reports SOB on exertion; coughed up thick yellow sputum. Tolerating po fluids well. CONSULT NOTE: 
2:25 PM Rico Raman NP  spoke with Dr. Adalgisa Samaniego, Consult for Hospitalist.  Discussed available diagnostic tests and clinical findings. Dr. Adalgisa Samaniego agrees to admit. 
 
2:26 PM 
Updated pt on plan for admission. 2:28 PM 
Patient's results and plan of care have been reviewed with him and his adult daughter. Patient and/or family have verbally conveyed their understanding and agreement of the patient's signs, symptoms, diagnosis, treatment and prognosis and additionally agree to be admitted.  Rico Raman NP

## 2018-11-27 LAB
ANION GAP SERPL CALC-SCNC: 11 MMOL/L (ref 5–15)
ATRIAL RATE: 59 BPM
BASOPHILS # BLD: 0.1 K/UL (ref 0–0.1)
BASOPHILS NFR BLD: 1 % (ref 0–1)
BUN SERPL-MCNC: 25 MG/DL (ref 6–20)
BUN/CREAT SERPL: 20 (ref 12–20)
CALCIUM SERPL-MCNC: 9.1 MG/DL (ref 8.5–10.1)
CALCULATED P AXIS, ECG09: 49 DEGREES
CALCULATED R AXIS, ECG10: 15 DEGREES
CALCULATED T AXIS, ECG11: 36 DEGREES
CHLORIDE SERPL-SCNC: 107 MMOL/L (ref 97–108)
CK SERPL-CCNC: 51 U/L (ref 39–308)
CO2 SERPL-SCNC: 24 MMOL/L (ref 21–32)
COMMENT, HOLDF: NORMAL
CREAT SERPL-MCNC: 1.27 MG/DL (ref 0.7–1.3)
DIAGNOSIS, 93000: NORMAL
DIFFERENTIAL METHOD BLD: ABNORMAL
EOSINOPHIL # BLD: 0.2 K/UL (ref 0–0.4)
EOSINOPHIL NFR BLD: 3 % (ref 0–7)
ERYTHROCYTE [DISTWIDTH] IN BLOOD BY AUTOMATED COUNT: 13 % (ref 11.5–14.5)
GLUCOSE SERPL-MCNC: 98 MG/DL (ref 65–100)
HCT VFR BLD AUTO: 37 % (ref 36.6–50.3)
HGB BLD-MCNC: 12 G/DL (ref 12.1–17)
IMM GRANULOCYTES # BLD: 0 K/UL (ref 0–0.04)
IMM GRANULOCYTES NFR BLD AUTO: 0 % (ref 0–0.5)
LYMPHOCYTES # BLD: 1.6 K/UL (ref 0.8–3.5)
LYMPHOCYTES NFR BLD: 27 % (ref 12–49)
MAGNESIUM SERPL-MCNC: 2 MG/DL (ref 1.6–2.4)
MCH RBC QN AUTO: 29.6 PG (ref 26–34)
MCHC RBC AUTO-ENTMCNC: 32.4 G/DL (ref 30–36.5)
MCV RBC AUTO: 91.4 FL (ref 80–99)
MONOCYTES # BLD: 0.6 K/UL (ref 0–1)
MONOCYTES NFR BLD: 10 % (ref 5–13)
NEUTS SEG # BLD: 3.5 K/UL (ref 1.8–8)
NEUTS SEG NFR BLD: 59 % (ref 32–75)
NRBC # BLD: 0 K/UL (ref 0–0.01)
NRBC BLD-RTO: 0 PER 100 WBC
P-R INTERVAL, ECG05: 168 MS
PLATELET # BLD AUTO: 256 K/UL (ref 150–400)
PMV BLD AUTO: 10.7 FL (ref 8.9–12.9)
POTASSIUM SERPL-SCNC: 4.2 MMOL/L (ref 3.5–5.1)
Q-T INTERVAL, ECG07: 438 MS
QRS DURATION, ECG06: 136 MS
QTC CALCULATION (BEZET), ECG08: 433 MS
RBC # BLD AUTO: 4.05 M/UL (ref 4.1–5.7)
SAMPLES BEING HELD,HOLD: NORMAL
SODIUM SERPL-SCNC: 142 MMOL/L (ref 136–145)
TROPONIN I SERPL-MCNC: 0.05 NG/ML
VENTRICULAR RATE, ECG03: 59 BPM
WBC # BLD AUTO: 5.9 K/UL (ref 4.1–11.1)

## 2018-11-27 PROCEDURE — 97116 GAIT TRAINING THERAPY: CPT

## 2018-11-27 PROCEDURE — 83735 ASSAY OF MAGNESIUM: CPT

## 2018-11-27 PROCEDURE — 74011250637 HC RX REV CODE- 250/637: Performed by: INTERNAL MEDICINE

## 2018-11-27 PROCEDURE — 74011250636 HC RX REV CODE- 250/636: Performed by: INTERNAL MEDICINE

## 2018-11-27 PROCEDURE — 97165 OT EVAL LOW COMPLEX 30 MIN: CPT

## 2018-11-27 PROCEDURE — 97530 THERAPEUTIC ACTIVITIES: CPT

## 2018-11-27 PROCEDURE — 97535 SELF CARE MNGMENT TRAINING: CPT

## 2018-11-27 PROCEDURE — 85025 COMPLETE CBC W/AUTO DIFF WBC: CPT

## 2018-11-27 PROCEDURE — 84484 ASSAY OF TROPONIN QUANT: CPT

## 2018-11-27 PROCEDURE — 65270000029 HC RM PRIVATE

## 2018-11-27 PROCEDURE — 80048 BASIC METABOLIC PNL TOTAL CA: CPT

## 2018-11-27 PROCEDURE — 93306 TTE W/DOPPLER COMPLETE: CPT

## 2018-11-27 PROCEDURE — 97162 PT EVAL MOD COMPLEX 30 MIN: CPT

## 2018-11-27 PROCEDURE — 82550 ASSAY OF CK (CPK): CPT

## 2018-11-27 PROCEDURE — 36415 COLL VENOUS BLD VENIPUNCTURE: CPT

## 2018-11-27 RX ADMIN — CLOPIDOGREL BISULFATE 75 MG: 75 TABLET ORAL at 09:53

## 2018-11-27 RX ADMIN — ASPIRIN 81 MG: 81 TABLET ORAL at 09:53

## 2018-11-27 RX ADMIN — GUAIFENESIN 600 MG: 600 TABLET, EXTENDED RELEASE ORAL at 21:20

## 2018-11-27 RX ADMIN — ATORVASTATIN CALCIUM 40 MG: 10 TABLET, FILM COATED ORAL at 17:46

## 2018-11-27 RX ADMIN — Medication 10 ML: at 06:05

## 2018-11-27 RX ADMIN — THERA TABS 1 TABLET: TAB at 17:46

## 2018-11-27 RX ADMIN — ENOXAPARIN SODIUM 40 MG: 40 INJECTION SUBCUTANEOUS at 15:19

## 2018-11-27 RX ADMIN — METOPROLOL SUCCINATE 50 MG: 50 TABLET, EXTENDED RELEASE ORAL at 17:46

## 2018-11-27 RX ADMIN — Medication 10 ML: at 21:21

## 2018-11-27 RX ADMIN — GUAIFENESIN 600 MG: 600 TABLET, EXTENDED RELEASE ORAL at 09:53

## 2018-11-27 RX ADMIN — AMLODIPINE BESYLATE 5 MG: 5 TABLET ORAL at 06:04

## 2018-11-27 RX ADMIN — AZITHROMYCIN MONOHYDRATE 500 MG: 500 INJECTION, POWDER, LYOPHILIZED, FOR SOLUTION INTRAVENOUS at 15:20

## 2018-11-27 RX ADMIN — Medication 10 ML: at 15:21

## 2018-11-27 RX ADMIN — LOSARTAN POTASSIUM 100 MG: 50 TABLET, FILM COATED ORAL at 09:53
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
## 2018-11-27 NOTE — PROGRESS NOTES
Problem: Falls - Risk of 
Goal: *Absence of Falls Document [place] Fall Risk and appropriate interventions in the flowsheet. Outcome: Progressing Towards Goal 
Fall Risk Interventions: 
Mobility Interventions: Bed/chair exit alarm, Communicate number of staff needed for ambulation/transfer, Patient to call before getting OOB, PT Consult for mobility concerns Medication Interventions: Teach patient to arise slowly, Patient to call before getting OOB, Bed/chair exit alarm Elimination Interventions: Call light in reach, Urinal in reach, Patient to call for help with toileting needs History of Falls Interventions: Investigate reason for fall, Door open when patient unattended

## 2018-11-27 NOTE — CONSULTS
Yeison Ordaz MD, Viky Newell [address] 
[phone] Date of  Admission: 11/26/2018 11:51 AM  
 
 
 
IMPRESSION and RECOMMENDATIONS 1.  CP:  Pt denies anginal-sounding CP with unremarkable enzymes and EKG. Doubt further ischemia w/u is warranted at this time. S/P EZRA 6/7/17. Continue ASA/plavix, lipitor. 2.  AS:  Moderate by last echo (5/12/17) and by exam.  Seems reasonable to repeat as it's been > 1 yr. I have discussed this plan with the patient. He appears to understand this plan and wishes to proceed ahead. (Louisa, [address] [facility]) Problem List  Date Reviewed: 11/27/2018 Codes Class Noted Acute bronchitis ICD-10-CM: J20.9 ICD-9-CM: 466.0  11/26/2018 * (Principal) AVILA (dyspnea on exertion) ICD-10-CM: R06.09 
ICD-9-CM: 786.09  11/26/2018 Mild asthma ICD-10-CM: W28.379 ICD-9-CM: 493.90  11/3/2017 Presence of stent in coronary artery in patient with coronary artery disease ICD-10-CM: I25.10, Z95.5 ICD-9-CM: 414.01, V45.82  11/2/2017 History of throat cancer ICD-10-CM: Z85.819 ICD-9-CM: V10.02  6/24/2016 ACP (advance care planning) ICD-10-CM: Z71.89 ICD-9-CM: V65.49  12/18/2015 Overview Signed 12/18/2015 11:55 AM by Emmett Sarmiento MD  
  Yes, on file Right knee DJD ICD-10-CM: M17.11 ICD-9-CM: 715.96  8/4/2014 Aortic stenosis ICD-10-CM: I35.0 ICD-9-CM: 424.1  1/3/2014 Hypertension ICD-10-CM: I10 
ICD-9-CM: 401.9  8/1/2013 Hyperlipidemia ICD-10-CM: E78.5 ICD-9-CM: 272.4  8/1/2013 DDD (degenerative disc disease), lumbar ICD-10-CM: M51.36 
ICD-9-CM: 722.52  8/1/2013 BPH (benign prostatic hyperplasia) ICD-10-CM: N40.0 ICD-9-CM: 600.00  8/1/2013 History of Present Illness:  
 
Ryan Saldana is a 80 y.o. male with the above problem list who was admitted for Cough. Pt presented with cough productive of yellow sputum, fatigue, and weakness. This has been evolving over several days. Daughter suggested coming to ER. Admitted with pneumonia. He denies anginal-sounding chest pain/discomfort, shortness of breath, dyspnea on exertion, orthopnea, paroxysmal noctural dyspnea, lower extremity edema, palpitations, syncope, or near-syncope. Current Facility-Administered Medications Medication Dose Route Frequency  guaiFENesin ER (MUCINEX) tablet 600 mg  600 mg Oral Q12H  
 sodium chloride (NS) flush 5-10 mL  5-10 mL IntraVENous Q8H  
 sodium chloride (NS) flush 5-10 mL  5-10 mL IntraVENous PRN  
 acetaminophen (TYLENOL) tablet 650 mg  650 mg Oral Q4H PRN  
 naloxone (NARCAN) injection 0.4 mg  0.4 mg IntraVENous PRN  
 ondansetron (ZOFRAN) injection 4 mg  4 mg IntraVENous Q4H PRN  
 enoxaparin (LOVENOX) injection 40 mg  40 mg SubCUTAneous Q24H  
 azithromycin (ZITHROMAX) 500 mg in 0.9% sodium chloride (MBP/ADV) 250 mL  500 mg IntraVENous Q24H  
 amLODIPine (NORVASC) tablet 5 mg  5 mg Oral 7am  
 aspirin delayed-release tablet 81 mg  81 mg Oral DAILY  atorvastatin (LIPITOR) tablet 40 mg  40 mg Oral QPM  
 clopidogrel (PLAVIX) tablet 75 mg  75 mg Oral DAILY  losartan (COZAAR) tablet 100 mg  100 mg Oral DAILY  metoprolol succinate (TOPROL-XL) XL tablet 50 mg  50 mg Oral QPM  
 therapeutic multivitamin (THERAGRAN) tablet 1 Tab  1 Tab Oral PCD  albuterol (ACCUNEB) nebulizer solution 1.25 mg  1.25 mg Nebulization Q6H PRN No Known Allergies Family History Problem Relation Age of Onset  Cancer Father   
     lung  Stroke Mother  Diabetes Mother  Diabetes Sister  Cancer Sister Social History Socioeconomic History  Marital status:  Spouse name: Not on file  Number of children: 1  Years of education: Not on file  Highest education level: Not on file Social Needs  Financial resource strain: Not on file  Food insecurity - worry: Not on file  Food insecurity - inability: Not on file  Transportation needs - medical: Not on file  Transportation needs - non-medical: Not on file Occupational History  Occupation: retired Tobacco Use  Smoking status: Former Smoker Packs/day: 1.00 Years: 26.00 Pack years: 26.00 Types: Cigarettes Last attempt to quit: 1976 Years since quittin.9  Smokeless tobacco: Never Used Substance and Sexual Activity  Alcohol use: Yes Alcohol/week: 0.6 oz Types: 1 Shots of liquor per week Comment: 4-5x week burbon in afternoon  Drug use: No  
 Sexual activity: No  
Other Topics Concern  Not on file Social History Narrative  lives on his own Physical Exam:  
 
Patient Vitals for the past 16 hrs: 
 BP Temp Pulse Resp SpO2  
18 0821 138/63 98.6 °F (37 °C) (!) 57 16 93 % 18 0417 129/76 98.5 °F (36.9 °C) 61 16 95 % 18 2331 136/73 98.9 °F (37.2 °C) 61 16 94 % 18 2144 146/75 98 °F (36.7 °C) 65 16 92 % 18 2000 132/69 98.1 °F (36.7 °C) 90 16 94 % 18 1900 144/78  85 15 95 % 18 1800   84 22  HEENT Exam:   
 Normocephalic, atraumatic. EOMI. Oropharynx negative. Neck supple. No lymphadenopathy. Lung Exam:   
 The patient is not dyspneic. There is no cough. Breath sounds are heard equally in all lung fields. There are no wheezes, rales, rhonchi, or rubs heard on auscultation. Mild coarse BS. Heart Exam: The rhythm is regular. The PMI is in the 5th intercostal space of the MCL. Apical impulse is normal. S1 is regular. S2 is physiologic. There is no S3, S4 gallop, click, or rub. 3/6 mid-peaking SM @ RUSB with diffuse radiation. Abdomen Exam:   
 Bowel sounds are normoactive. Abdomen soft in all quadrants. No tenderness. No palpable masses. No organomegaly. No hernias noted.  No bruits or pulsatile mass. Extremities Exam: The extremities are atraumatic appearing. There is no clubbing, cyanosis, edema, ulcers, varicose veins, rash, erythemia noted in the extremities. The neurovascular status is grossly intact with normal distal sensation and pulses. Vascular Exam: The radial, brachial, dorsalis pedis, posterior tibial, are equal and strong bilaterally The carotids are equal bilaterally without bruits. Labs:  
 
Lab Results Component Value Date/Time Glucose 98 11/27/2018 12:14 AM  
 Sodium 142 11/27/2018 12:14 AM  
 Potassium 4.2 11/27/2018 12:14 AM  
 Chloride 107 11/27/2018 12:14 AM  
 CO2 24 11/27/2018 12:14 AM  
 BUN 25 (H) 11/27/2018 12:14 AM  
 Creatinine 1.27 11/27/2018 12:14 AM  
 Calcium 9.1 11/27/2018 12:14 AM  
 
Recent Labs  
  11/27/18 
0014 11/26/18 
1153 WBC 5.9 7.1 HGB 12.0* 12.7 HCT 37.0 38.3  273 Recent Labs  
  11/26/18 
1153 SGOT 24 ALT 29  
* TBILI 0.3 TP 7.2 ALB 3.4*  
GLOB 3.8 No results for input(s): INR, PTP, APTT in the last 72 hours. No lab exists for component: INREXT Recent Labs  
  11/26/18 
1153 CPK 66 CKMB 4.1* Recent Labs  
  11/27/18 
0014 TROIQ 0.05* Lab Results Component Value Date/Time Cholesterol, total 122 05/01/2018 10:40 AM  
 HDL Cholesterol 46 05/01/2018 10:40 AM  
 LDL, calculated 58 05/01/2018 10:40 AM  
 Triglyceride 88 05/01/2018 10:40 AM  
 
 
EKG:  SB @ 59, IRBBB. Echo (5/12/17):  EF 65-70%. Mod AS (PG 44, MG 28).

## 2018-11-27 NOTE — PROGRESS NOTES
Per chart review, Pt experiencing weakness. discharge recommendation for rehab. Pt lives in the [facility] area. Pt lives alone. CM will continue to follow for discharge planning. Allie Tafoya, BS, [facility]

## 2018-11-27 NOTE — PROGRESS NOTES
Problem: Self Care Deficits Care Plan (Adult) Goal: *Acute Goals and Plan of Care (Insert Text) Occupational Therapy Goals Initiated 11/27/2018 1. Patient will perform lower body dressing with minimal assistance/contact guard assist within 7 day(s). 2.  Patient will perform upper body dressing with supervision/set-up within 7 day(s). 3.  Patient will perform grooming, standing at sink, with supervision/set-up within 7 day(s). 4.  Patient will perform toilet transfers with supervision/set-up within 7 day(s). 5.  Patient will perform all aspects of toileting with supervision/set-up within 7 day(s). 6.  Patient will participate in upper extremity therapeutic exercise/activities with supervision/set-up for 10 minutes within 7 day(s). Occupational Therapy EVALUATION Patient: Jayce Bundy (75 y.o. male) Date: 11/27/2018 Primary Diagnosis: Cough Precautions:   Fall ASSESSMENT : 
Based on the objective data described below, the patient presents with hospital admission secondary to cough and weakness. Patient received supine in bed, calling for assist for toileting. Per patient and family present in room, patient independent at home but with increased difficulty over recent weeks. Patient lives alone and secondary to noted difficulty by daughter, patient has been staying with her. Patient requires mod assist for bed mobility, min assist for sit to stand and transfer to commode. Patient able to lower pants for toileting but required assist to pull up. Patient requires repetition of instruction at times secondary to [facility]. Patient with decreased activity tolerance, decreased functional mobility, decreased strength and balance. Patient will benefit from continued OT services to increase safety and independence with ADL tasks. Recommend patient discharge to rehab setting at discharge. Patient will benefit from skilled intervention to address the above impairments. Patients rehabilitation potential is considered to be Good Factors which may influence rehabilitation potential include:  
[x]             None noted []             Mental ability/status []             Medical condition []             Home/family situation and support systems []             Safety awareness []             Pain tolerance/management 
[]             Other: PLAN : 
Recommendations and Planned Interventions: 
[x]               Self Care Training                  [x]        Therapeutic Activities [x]               Functional Mobility Training    []        Cognitive Retraining 
[x]               Therapeutic Exercises           [x]        Endurance Activities [x]               Balance Training                   []        Neuromuscular Re-Education []               Visual/Perceptual Training     [x]   Home Safety Training 
[x]               Patient Education                 [x]        Family Training/Education []               Other (comment): Frequency/Duration: Patient will be followed by occupational therapy 5 times a week to address goals. Discharge Recommendations: Rehab Further Equipment Recommendations for Discharge: TBD SUBJECTIVE:  
Patient stated I can't figure out how to use that sock puller my niece got me.  OBJECTIVE DATA SUMMARY:  
HISTORY:  
Past Medical History:  
Diagnosis Date  BPH (benign prostatic hyperplasia)  CKD (chronic kidney disease) 5/28/15 pt denies kidney disease  DDD (degenerative disc disease), lumbar  Elevated PSA  Heart murmur Dr Andrews Cecy  Hoarse   
 x6 months as of 5/28/15; being evaluated by Dr Rasheeda Campbell  Hyperlipidemia  Hypertension Dr Patricia Mata  Osteoarthritis, shoulder  Positive PPD, treated 1990  
 treated with INH  Sensorineural hearing loss   
 as of 5/28/15 pt wears hearing aids  Unspecified adverse effect of anesthesia 1960s  
 delayed awaking Past Surgical History: Procedure Laterality Date  HX CATARACT REMOVAL Bilateral 2004  HX COLONOSCOPY  2006 [address]  HX INTRAOCULAR LENS PROSTHESIS    
 bilateral  
 HX LUMBAR LAMINECTOMY  2006 Lidia Brady  HX MOHS PROCEDURES  prior to 2014  
 and bicep repair  TOTAL KNEE ARTHROPLASTY Right 8/4/14 Prior Level of Function/Environment/Context: independent in recent past  
Occupations in which the patient is/was successful, what are the barriers preventing that success:  
Performance Patterns (routines, roles, habits, and rituals):  
Personal Interests and/or values:  
Expanded or extensive additional review of patient history:  
 
Home Situation Home Environment: Private residence # Steps to Enter: 5 Rails to Enter: Yes One/Two Story Residence: One story Living Alone: Yes Support Systems: Child(hamlet) Patient Expects to be Discharged to[de-identified] Rehabilitation facility Current DME Used/Available at Home: Philip Styles, kiran, [place], straight Tub or Shower Type: Shower Hand dominance: RightEXAMINATION OF PERFORMANCE DEFICITS: 
Cognitive/Behavioral Status: 
Neurologic State: Alert Orientation Level: Oriented X4 Cognition: Follows commands([employer]) Perception: Appears intact Perseveration: No perseveration noted Safety/Judgement: Awareness of environment Skin: intact as seen Edema: none noted Hearing: Auditory Auditory Impairment: None Vision/Perceptual:   
    
    
    
  
    
    
  
Range of Motion: 
AROM: Within functional limits Strength: 
Strength: Generally decreased, functional 
  
  
  
  
Coordination: 
Coordination: Within functional limits Tone & Sensation: 
Tone: Normal 
Sensation: Intact Balance: 
Sitting: Intact Standing: With support Standing - Static: Good Standing - Dynamic : Fair Functional Mobility and Transfers for ADLs:Bed Mobility: 
Supine to Sit: Additional time;Minimum assistance Scooting: Additional time;Contact guard assistance Transfers: 
Sit to Stand: Additional time;Minimum assistance; Moderate assistance(cues for UE placement 3/3 trials) Stand to Sit: Additional time;Minimum assistance Toilet Transfer : Minimum assistance ADL Assessment: 
Feeding: Supervision Oral Facial Hygiene/Grooming: Minimum assistance(standing ) Bathing: Moderate assistance Upper Body Dressing: Contact guard assistance Lower Body Dressing: Moderate assistance Toileting: Minimum assistance ADL Intervention and task modifications: 
  
 
  
 
Cognitive Retraining Safety/Judgement: Awareness of environment Therapeutic Exercise: 
  
Functional Measure: 
Barthel Index: 
 
Bathin Bladder: 10 Bowels: 10 
Groomin Dressin Feedin Mobility: 5 Stairs: 5 Toilet Use: 5 Transfer (Bed to Chair and Back): 5 Total: 55 Barthel and G-code impairment scale: 
Percentage of impairment CH 
0% CI 
1-19% CJ 
20-39% CK 
40-59% CL 
60-79% CM 
80-99% CN 
100% Barthel Score 0-100 100 99-80 79-60 59-40 20-39 1-19 
 0 Barthel Score 0-20 20 17-19 13-16 9-12 5-8 1-4 0 The Barthel ADL Index: Guidelines 1. The index should be used as a record of what a patient does, not as a record of what a patient could do. 2. The main aim is to establish degree of independence from any help, physical or verbal, however minor and for whatever reason. 3. The need for supervision renders the patient not independent. 4. A patient's performance should be established using the best available evidence. Asking the patient, friends/relatives and nurses are the usual sources, but direct observation and common sense are also important. However direct testing is not needed. 5. Usually the patient's performance over the preceding 24-48 hours is important, but occasionally longer periods will be relevant. 6. Middle categories imply that the patient supplies over 50 per cent of the effort. 7. Use of aids to be independent is allowed. Jamal Izquierdo., Barthel, D.W. (4364). Functional evaluation: the Barthel Index. [address] (14)2. GREGORIA Carbone, Namrata Roland., Quincy Winter., Leena Keatingjosue, [address] [facility] (1999). Measuring the change indisability after inpatient rehabilitation; comparison of the responsiveness of the Barthel Index and Functional [place] Measure. Journal of Neurology, Neurosurgery, and Psychiatry, 66(4), 630-217. NORTH Portillo, YOEL Esparza, & Shin Armendariz MERIKA. (2004.) Assessment of post-stroke quality of life in cost-effectiveness studies: The usefulness of the Barthel Index and the EuroQoL-5D. [facility], 13, 813-00 G codes: In compliance with CMSs Claims Based Outcome Reporting, the following G-code set was chosen for this patient based on their primary functional limitation being treated: The outcome measure chosen to determine the severity of the functional limitation was the Barthel Index with a score of 55/100 which was correlated with the impairment scale. ? Self Care:  
  - CURRENT STATUS: CK - 40%-59% impaired, limited or restricted  - GOAL STATUS: CJ - 20%-39% impaired, limited or restricted  - D/C STATUS:  ---------------To be determined--------------- Occupational Therapy Evaluation Charge Determination History Examination Decision-Making LOW Complexity : Brief history review  LOW Complexity : 1-3 performance deficits relating to physical, cognitive , or psychosocial skils that result in activity limitations and / or participation restrictions  LOW Complexity : No comorbidities that affect functional and no verbal or physical assistance needed to complete eval tasks Based on the above components, the patient evaluation is determined to be of the following complexity level: LOW Pain: 
Pain Scale 1: Numeric (0 - 10) Pain Intensity 1: 0 Activity Tolerance: VSS  
 Please refer to the flowsheet for vital signs taken during this treatment. After treatment:  
[x] Patient left in no apparent distress sitting up in chair 
[] Patient left in no apparent distress in bed 
[x] Call bell left within reach [x] Nursing notified 
[x] Caregiver present [x] Bed alarm activated COMMUNICATION/EDUCATION:  
The patients plan of care was discussed with: Physical Therapist and Registered Nurse. [x] Home safety education was provided and the patient/caregiver indicated understanding. [x] Patient/family have participated as able in goal setting and plan of care. [x] Patient/family agree to work toward stated goals and plan of care. [] Patient understands intent and goals of therapy, but is neutral about his/her participation. [] Patient is unable to participate in goal setting and plan of care. This patients plan of care is appropriate for delegation to [facility] [place]. Thank you for this referral. 
Jamal Light OTR/L Time Calculation: 24 mins

## 2018-11-27 NOTE — ED NOTES
Verbal/Bedside report was given to Daniel Rodriguez RN who will assume care of patient at this time. SBAR report consisted of ED summary, MAR, recent results, and additional pertinent information. Receiving nurse given opportunity to ask questions and seek clarifications. Receiving nurse aware of pending lab results and orders that are to be completed.

## 2018-11-27 NOTE — PROGRESS NOTES
Kai Na[address]u [address] [place] [address] 
[address] [facility], [place], [address] [facility] 
[phone] Medical Progress Note NAME:         Susan Fallon :        1931 MRM:        [id number] Date:          2018 Subjective: Patient has been seen and examined as a follow up for suspected acute bronchitis. Chart, labs, diagnostics reviewed. He feels well but weak. He denies any chest pain or SOB. No fever or chills. Objective: 
 
Vital Signs: 
 
Visit Vitals /59 (BP 1 Location: Left arm, BP Patient Position: At rest) Pulse (!) 57 Comment: Notified RN Temp 98.5 °F (36.9 °C) Resp 16 Ht 5' 7\" (1.702 m) Wt 83.5 kg (184 lb) SpO2 92% BMI 28.82 kg/m² Intake/Output Summary (Last 24 hours) at 2018 1412 Last data filed at 2018 0649 Gross per 24 hour Intake 240 ml Output 650 ml Net -410 ml Physical Examination: 
General:   Weak looking and not in much distress Eyes:   pink conjunctivae, PERRLA with no discharge. ENT:   no ottorrhea or rhinorrhea with dry mucous membranes Neck: no masses, thyroid non-tender and trachea central. 
Pulm:  no accessory muscle use, decreased breath sounds without crackles or wheezes Card:  no JVD or murmurs, has regular and normal S1, S2 without thrills, bruits or peripheral edema Abd:  Soft, non-tender, non-distended, normoactive bowel sounds with no palpable organomegaly Musc:  No cyanosis, clubbing, atrophy or deformities. Skin:  No rashes, bruising or ulcers. Neuro: Awake and alert. Generally a non focal exam. Follows commands appropriately Psych:  Has a fair insight and is oriented x 3 Current Facility-Administered Medications Medication Dose Route Frequency  guaiFENesin ER (MUCINEX) tablet 600 mg  600 mg Oral Q12H  
 sodium chloride (NS) flush 5-10 mL  5-10 mL IntraVENous Q8H  
  sodium chloride (NS) flush 5-10 mL  5-10 mL IntraVENous PRN  
 acetaminophen (TYLENOL) tablet 650 mg  650 mg Oral Q4H PRN  
 naloxone (NARCAN) injection 0.4 mg  0.4 mg IntraVENous PRN  
 ondansetron (ZOFRAN) injection 4 mg  4 mg IntraVENous Q4H PRN  
 enoxaparin (LOVENOX) injection 40 mg  40 mg SubCUTAneous Q24H  
 azithromycin (ZITHROMAX) 500 mg in 0.9% sodium chloride (MBP/ADV) 250 mL  500 mg IntraVENous Q24H  
 amLODIPine (NORVASC) tablet 5 mg  5 mg Oral 7am  
 aspirin delayed-release tablet 81 mg  81 mg Oral DAILY  atorvastatin (LIPITOR) tablet 40 mg  40 mg Oral QPM  
 clopidogrel (PLAVIX) tablet 75 mg  75 mg Oral DAILY  losartan (COZAAR) tablet 100 mg  100 mg Oral DAILY  metoprolol succinate (TOPROL-XL) XL tablet 50 mg  50 mg Oral QPM  
 therapeutic multivitamin (THERAGRAN) tablet 1 Tab  1 Tab Oral PCD  albuterol (ACCUNEB) nebulizer solution 1.25 mg  1.25 mg Nebulization Q6H PRN Laboratory data and review: 
 
Recent Labs  
  11/27/18 
0014 11/26/18 
1153 WBC 5.9 7.1 HGB 12.0* 12.7 HCT 37.0 38.3  273 Recent Labs  
  11/27/18 
0014 11/26/18 
1153  138  
K 4.2 4.5  
 104 CO2 24 22 GLU 98 145* BUN 25* 27* CREA 1.27 1.30 CA 9.1 9.2 MG 2.0 2.0 ALB  --  3.4* SGOT  --  24 ALT  --  29 No components found for: [place] Other Diagnostics: 
 
CT abdomen, CXR - reviewed Assessment and Plan: 
 
Acute bronchitis (11/26/2018): very unlikely pneumonia. He remains afebrile. No SIRS criteria. Respiratory viral screen neg. Continue empiric IV antibiotics for now but discontinue in AM if he remains afebrile CAD / Aortic stenosis POA: currently no anginal symptoms. Seen by cardiology. Troponin neg. Follow Echocardiogram. Continue Asprin, Lipitor, Plavix and Metoprolol. HTN: BP stable. Continue Metoprolol, Losartan Dyslipidemia POA: Continue Lipitor BPH : No symptoms. Total time spent for the patient's care: 35 Minutes Care Plan discussed with: Patient, Nursing Staff and Consultant/Specialist 
 
Discussed:  Care Plan and D/C Planning Prophylaxis:  Lovenox Anticipated Disposition:  Home w/Family 
        
___________________________________________________ Attending Physician:   Yordy Alvarez MD

## 2018-11-27 NOTE — ED NOTES
5th floor charge nurse- Gregg Davis RN called regarding room assignment received, this relayed to Tristan Vo- ED hold nurse.

## 2018-11-27 NOTE — PROGRESS NOTES
Occupational therapy note: 
Orders received, chart reviewed. Attempted to see patient for OT evaluation. Patient currently receiving ECHO at bedside. Will follow up with with patient at later time. Antonieta Coto MS OTR/L

## 2018-11-27 NOTE — PROGRESS NOTES
Bedside and Verbal shift change report given to Ioana Madrid RN (oncoming nurse) by Sha Carrillo RN (offgoing nurse). Report included the following information SBAR, Kardex, ED Summary, Recent Results, Med Rec Status and Cardiac Rhythm NSR.  
 
1950  Initial assessment completed. Introduced self as primary RN. VSS.  Pt denies additional complaints at this time. Plan for the evening discussed with pt and verbalized understanding. Bed locked and in low position with call bell within reach.  Instructed him to press call villarreal when needed. White board updated with this RN's name. 2057  TRANSFER - OUT REPORT: 
 
Verbal report given to Emeterio Jarquin RN (name) on Brenda Locks  being transferred to Med/Surg (unit) for routine progression of care Report consisted of patients Situation, Background, Assessment and  
Recommendations(SBAR). Information from the following report(s) SBAR, Kardex, Recent Results, Med Rec Status and Cardiac Rhythm NSR was reviewed with the receiving nurse. Lines:  
Peripheral IV 11/26/18 Left Antecubital (Active) Site Assessment Clean, dry, & intact 11/26/2018  8:00 PM  
Phlebitis Assessment 0 11/26/2018  8:00 PM  
Infiltration Assessment 0 11/26/2018  8:00 PM  
Dressing Status Clean, dry, & intact 11/26/2018  8:00 PM  
Dressing Type Transparent;Tape 11/26/2018  8:00 PM  
Hub Color/Line Status Pink;Capped;Flushed 11/26/2018  8:00 PM  
Action Taken Open ports on tubing capped 11/26/2018  8:00 PM  
Alcohol Cap Used Yes 11/26/2018  8:00 PM  
  
 
Opportunity for questions and clarification was provided. Patient transported with: 
 [employer]

## 2018-11-27 NOTE — PROGRESS NOTES
Bedside shift change report given to [employer], RN (oncoming nurse) by Emma Kay RN (offgoing nurse). Report included the following information SBAR, Kardex, Intake/Output, MAR, Accordion and Recent Results.

## 2018-11-28 LAB
BACTERIA SPEC CULT: NORMAL
GRAM STN SPEC: NORMAL
SERVICE CMNT-IMP: NORMAL
SERVICE CMNT-IMP: NORMAL

## 2018-11-28 PROCEDURE — 97530 THERAPEUTIC ACTIVITIES: CPT

## 2018-11-28 PROCEDURE — 74011250637 HC RX REV CODE- 250/637: Performed by: INTERNAL MEDICINE

## 2018-11-28 PROCEDURE — 65270000029 HC RM PRIVATE

## 2018-11-28 PROCEDURE — 97535 SELF CARE MNGMENT TRAINING: CPT

## 2018-11-28 PROCEDURE — 74011250636 HC RX REV CODE- 250/636: Performed by: INTERNAL MEDICINE

## 2018-11-28 RX ORDER — AZITHROMYCIN 250 MG/1
500 TABLET, FILM COATED ORAL DAILY
Status: DISCONTINUED | OUTPATIENT
Start: 2018-11-28 | End: 2018-11-28

## 2018-11-28 RX ADMIN — METOPROLOL SUCCINATE 50 MG: 50 TABLET, EXTENDED RELEASE ORAL at 17:28

## 2018-11-28 RX ADMIN — GUAIFENESIN 600 MG: 600 TABLET, EXTENDED RELEASE ORAL at 08:28

## 2018-11-28 RX ADMIN — LOSARTAN POTASSIUM 100 MG: 50 TABLET, FILM COATED ORAL at 08:28

## 2018-11-28 RX ADMIN — Medication 10 ML: at 13:59

## 2018-11-28 RX ADMIN — THERA TABS 1 TABLET: TAB at 17:28

## 2018-11-28 RX ADMIN — Medication 10 ML: at 06:10

## 2018-11-28 RX ADMIN — GUAIFENESIN 600 MG: 600 TABLET, EXTENDED RELEASE ORAL at 21:13

## 2018-11-28 RX ADMIN — ENOXAPARIN SODIUM 40 MG: 40 INJECTION SUBCUTANEOUS at 15:56

## 2018-11-28 RX ADMIN — Medication 10 ML: at 21:14

## 2018-11-28 RX ADMIN — ATORVASTATIN CALCIUM 40 MG: 10 TABLET, FILM COATED ORAL at 17:28

## 2018-11-28 RX ADMIN — CLOPIDOGREL BISULFATE 75 MG: 75 TABLET ORAL at 08:28

## 2018-11-28 RX ADMIN — ASPIRIN 81 MG: 81 TABLET ORAL at 08:28

## 2018-11-28 RX ADMIN — AMLODIPINE BESYLATE 5 MG: 5 TABLET ORAL at 06:10

## 2018-11-28 NOTE — PROGRESS NOTES
CM met with Pt and daughter to discuss discharge recommendation for rehab. Pt and daughter were receptive. Pt 1st option was [facility]; 2nd [address] [place] [address]; 3rd [facility].  
 
 
3:20pm: CM was contacted by [address] [place] [address] liaison and was informed that Pt has been accepted will likely be able to take Pt tomorrow contingent on bed availability. CM submitted referral via allscripts. Allie Tafoya, BS, [facility]

## 2018-11-28 NOTE — PROGRESS NOTES
Bedside shift change report given to Samreen Mitchell RN (oncoming nurse) by Neale Schilder, RN (offgoing nurse). Report included the following information SBAR, Kardex, Procedure Summary, Intake/Output and Recent Results.

## 2018-11-28 NOTE — PROGRESS NOTES
Kai Naidu [facility] [address] 
[address] [facility], [address] [place] [address] 
[phone] Medical Progress Note NAME:         Aaron Keating :        1931 MRM:        [id number] Date:          2018 Subjective: Patient has been seen and examined as a follow up for suspected acute bronchitis. Chart, labs, diagnostics reviewed. He feels well but remains very weak. Has no chest pain or SOB. No fever or chills. Objective: 
 
Vital Signs: 
 
Visit Vitals /78 (BP 1 Location: Left arm, BP Patient Position: Sitting) Pulse 70 Temp 98.4 °F (36.9 °C) Resp 16 Ht 5' 7\" (1.702 m) Wt 83.5 kg (184 lb) SpO2 95% BMI 28.82 kg/m² Intake/Output Summary (Last 24 hours) at 2018 1044 Last data filed at 2018 7778 Gross per 24 hour Intake 730 ml Output  Net 730 ml Physical Examination: 
General:   Weak looking and not in much distress Eyes:   pink conjunctivae, PERRLA with no discharge. ENT:   no ottorrhea or rhinorrhea with dry mucous membranes Pulm:  Clear reath sounds without crackles or wheezes Card:  no JVD or murmurs, has regular and normal S1, S2 without thrills, bruits or peripheral edema Abd:  Soft, non-tender, non-distended, normoactive bowel sounds Musc:  No cyanosis, clubbing, atrophy or deformities. Skin:  No rashes, bruising or ulcers. Neuro: Awake and alert. Generally a non focal exam. Follows commands appropriately Psych:  Has a fair insight and is oriented x 3 Current Facility-Administered Medications Medication Dose Route Frequency  guaiFENesin ER (MUCINEX) tablet 600 mg  600 mg Oral Q12H  
 sodium chloride (NS) flush 5-10 mL  5-10 mL IntraVENous Q8H  
 sodium chloride (NS) flush 5-10 mL  5-10 mL IntraVENous PRN  
 acetaminophen (TYLENOL) tablet 650 mg  650 mg Oral Q4H PRN  
 naloxone (NARCAN) injection 0.4 mg  0.4 mg IntraVENous PRN  
  ondansetron (ZOFRAN) injection 4 mg  4 mg IntraVENous Q4H PRN  
 enoxaparin (LOVENOX) injection 40 mg  40 mg SubCUTAneous Q24H  
 amLODIPine (NORVASC) tablet 5 mg  5 mg Oral 7am  
 aspirin delayed-release tablet 81 mg  81 mg Oral DAILY  atorvastatin (LIPITOR) tablet 40 mg  40 mg Oral QPM  
 clopidogrel (PLAVIX) tablet 75 mg  75 mg Oral DAILY  losartan (COZAAR) tablet 100 mg  100 mg Oral DAILY  metoprolol succinate (TOPROL-XL) XL tablet 50 mg  50 mg Oral QPM  
 therapeutic multivitamin (THERAGRAN) tablet 1 Tab  1 Tab Oral PCD  albuterol (ACCUNEB) nebulizer solution 1.25 mg  1.25 mg Nebulization Q6H PRN Laboratory data and review: 
 
Recent Labs  
  11/27/18 
0014 11/26/18 
1153 WBC 5.9 7.1 HGB 12.0* 12.7 HCT 37.0 38.3  273 Recent Labs  
  11/27/18 
0014 11/26/18 
1153  138  
K 4.2 4.5  
 104 CO2 24 22 GLU 98 145* BUN 25* 27* CREA 1.27 1.30 CA 9.1 9.2 MG 2.0 2.0 ALB  --  3.4* SGOT  --  24 ALT  --  29 No components found for: [place] Other Diagnostics: 
 
CT abdomen, CXR - reviewed Assessment and Plan: 
 
Acute bronchitis (11/26/2018): very unlikely pneumonia. He has remained. No SIRS criteria. Respiratory viral screen neg. Discontinue IV antibiotics. Supportive care. Mobilize. CAD / Aortic stenosis POA: currently no anginal symptoms. Seen by cardiology. Troponin neg. Echocardiogram showed a normal LV function. AS moderate. No further testing at this time. Continue Asprin, Lipitor, Plavix and Metoprolol. Out patient follow up Physical debility/ generalized weakness POA: seems worse likely from current illness. Seen by PT, OT. He lives alone. Needs rehab. CM working on [employer] HTN: BP stable. Continue Metoprolol, Losartan Dyslipidemia POA: Continue Lipitor BPH : No symptoms. Total time spent for the patient's care: 35 Minutes Care Plan discussed with: Patient, Nursing Staff and Consultant/Specialist 
 
 Discussed:  Care Plan and D/C Planning Prophylaxis:  Lovenox Anticipated Disposition:  Home w/Family 
        
___________________________________________________ Attending Physician:   Chasidy Aguilar MD

## 2018-11-28 NOTE — PROGRESS NOTES
Problem: Mobility Impaired (Adult and Pediatric) Goal: *Acute Goals and Plan of Care (Insert Text) Physical Therapy Goals Initiated 11/27/2018 1. Patient will move from supine to sit and sit to supine  in bed with supervision/set-up within 7 day(s). 2.  Patient will transfer from bed to chair and chair to bed with supervision/set-up using the least restrictive device within 7 day(s). 3.  Patient will perform sit to stand with supervision/set-up within 7 day(s). 4.  Patient will ambulate with supervision/set-up for 150 feet with the least restrictive device within 7 day(s). 5.  Patient will ascend/descend 5 stairs with handrail(s) with minimal assistance/contact guard assist within 7 day(s). physical Therapy TREATMENT Patient: Susan Fallon (10 y.o. male) Date: 11/28/2018 Diagnosis: Cough AVILA (dyspnea on exertion) Precautions: Fall Chart, physical therapy assessment, plan of care and goals were reviewed. ASSESSMENT: 
Pt Frankie Lopez presents with limited activity tolerance associated with hypertension. He denies associated symptoms and performs functional mobility with minimal assistance and cueing. Pt continues to offer good participation and appears eager to improve mobility and independence. Progression toward goals: 
[]    Improving appropriately and progressing toward goals [x]    Improving slowly and progressing toward goals 
[]    Not making progress toward goals and plan of care will be adjusted PLAN: 
Patient continues to benefit from skilled intervention to address the above impairments. Continue treatment per established plan of care. Discharge Recommendations:  Rehab Further Equipment Recommendations for Discharge:  Defer to rehab SUBJECTIVE:  
Patient stated I sat in the chair for about three hours earlier today.  Pt received supine, agreeable to PT and cleared by RN. OBJECTIVE DATA SUMMARY:  
Critical Behavior: 
Neurologic State: Alert Orientation Level: Oriented X4 Cognition: Follows commands Safety/Judgement: Awareness of environment Functional Mobility Training: 
Bed Mobility: 
Rolling: Additional time;Contact guard assistance Supine to Sit: Additional time;Minimum assistance Sit to Supine: Additional time;Minimum assistance Scooting: Additional time;Supervision Transfers: 
Sit to Stand: Additional time;Minimum assistance Stand to Sit: Additional time;Minimum assistance Balance: 
Sitting: Intact Standing: With support Standing - Static: Good Standing - Dynamic : FairAmbulation/Gait Training: 
  
  
  
  
  
  
  
 deferred due to elevated BP Pain: 
Pain Scale 1: Numeric (0 - 10) Pain Intensity 1: 0 Activity Tolerance: No pt complaints. Please refer to the flowsheet for vital signs taken during this treatment. After treatment:  
[]    Patient left in no apparent distress sitting up in chair 
[x]    Patient left in no apparent distress in bed 
[x]    Call bell left within reach [x]    Nursing notified 
[x]    Caregiver present [x]    Bed alarm activated Pt educated regarding safety precautions including proper footwear and need to contact staff for assistance with all mobility. Pt verbalizes understanding. l 
 
COMMUNICATION/COLLABORATION:  
The patients plan of care was discussed with: MARTINEZ Manning, PT, DPT Time Calculation: 20 mins

## 2018-11-28 NOTE — PROGRESS NOTES
[facility] Dosing Services: IV to PO Conversion The pharmacist has determined that this patient meets P & T approved criteria for conversion from IV to oral therapy for the following medication:Azithromycin The pharmacist has written the following order for the patient: azithromycin 500mg PO daily The pharmacist will continue to monitor the patient's status and advise the physician if conversion back to IV therapy is recommended. Signed Janelle Vizcarra Contact information:  [phone]

## 2018-11-28 NOTE — PROGRESS NOTES
Danielle Turner MD, [facility] 
[phone] IMPRESSION and RECOMMENDATIONS 1. AS:  Still moderate on echo with normal LVF. I don't think any specific cardiac interventions are warranted at this time. F/U at some point with his primary cardiologist, Dr. Mateusz Garduno. 2.  HTN:  BP trending up. If continues to remain elevated, could increase norvasc to 10mg every day. This can be reassessed after he recuperates from his bronchitis as an outpt. Stable for discharge from a cardiac standpoint. Subjective:  
 
 
Pt feels better. No specific complaints. Objective:  
 
Patient Vitals for the past 16 hrs: 
 BP Temp Pulse Resp SpO2  
11/28/18 0805 176/78 98.4 °F (36.9 °C) 70 16 95 % 11/28/18 0449 145/75 99 °F (37.2 °C) 68 16 90 % 11/27/18 2317 140/65 98 °F (36.7 °C) 77 16 95 % 11/27/18 1947 135/68 98.8 °F (37.1 °C) 63 16 91 % 11/27/18 194 [facility] Exam:   
 WNL Lung Exam:   
 The patient is not dyspneic. Breath sounds are heard equally in all lung fields. There are no wheezes, rales, rhonchi, or rubs heard on auscultation. Heart Exam: The rhythm is regular. The PMI is in the 5th intercostal space of the MCL. Apical impulse is normal. S1 is regular. S2 is physiologic. There is no S3, S4 gallop, click, or rub. 3/6 mid-peaking SM @ RUSB with diffuse radiation. Abdomen Exam:   
 Benign. Extremities Exam: No cyanosis, clubbing, edema. Distal pulses intact. Lab Results Component Value Date/Time Glucose 98 11/27/2018 12:14 AM  
 Sodium 142 11/27/2018 12:14 AM  
 Potassium 4.2 11/27/2018 12:14 AM  
 Chloride 107 11/27/2018 12:14 AM  
 CO2 24 11/27/2018 12:14 AM  
 BUN 25 (H) 11/27/2018 12:14 AM  
 Creatinine 1.27 11/27/2018 12:14 AM  
 Calcium 9.1 11/27/2018 12:14 AM  
 
Recent Labs  
  11/27/18 
0014 11/26/18 
1153 WBC 5.9 7.1 HGB 12.0* 12.7 HCT 37.0 38.3  273 Recent Labs  
  11/26/18 
1153 SGOT 24 ALT 29  
* TBILI 0.3 TP 7.2 ALB 3.4*  
GLOB 3.8 No results for input(s): INR, PTP, APTT in the last 72 hours. No lab exists for component: INREXT Recent Labs  
  11/26/18 
1153 CPK 66 CKMB 4.1* Recent Labs  
  11/27/18 
0014 TROIQ 0.05* Lab Results Component Value Date/Time Cholesterol, total 122 05/01/2018 10:40 AM  
 HDL Cholesterol 46 05/01/2018 10:40 AM  
 LDL, calculated 58 05/01/2018 10:40 AM  
 Triglyceride 88 05/01/2018 10:40 AM  
 
 
 
Echo: SUMMARY: 
Left ventricle: Systolic function was normal. Ejection fraction was 
estimated to be 60 %. There were no regional wall motion abnormalities. Wall thickness was markedly increased. There was severe concentric 
hypertrophy. Doppler parameters were consistent with abnormal left 
ventricular relaxation (grade 1 diastolic dysfunction). Left atrium: The atrium was mildly dilated. Mitral valve: There was moderate annular calcification. Aortic valve: The valve was trileaflet. Leaflets exhibited moderately 
increased thickness, mild calcification, moderately reduced cuspal 
separation, and reduced mobility. There was moderate stenosis. Valve peak 
gradient was 69 mmHg. Valve mean gradient was 37 mmHg. Aortic valve area 
was 1 cmï¾² by the continuity equation.

## 2018-11-28 NOTE — PROGRESS NOTES
Bedside shift change report given to Pedro Zavala (oncoming nurse) by Freddy Ross (offgoing nurse). Report included the following information SBAR, Kardex, MAR and Recent Results.

## 2018-11-28 NOTE — PROGRESS NOTES
Problem: Self Care Deficits Care Plan (Adult) Goal: *Acute Goals and Plan of Care (Insert Text) Occupational Therapy Goals Initiated 11/27/2018 1. Patient will perform lower body dressing with minimal assistance/contact guard assist within 7 day(s). 2.  Patient will perform upper body dressing with supervision/set-up within 7 day(s). 3.  Patient will perform grooming, standing at sink, with supervision/set-up within 7 day(s). 4.  Patient will perform toilet transfers with supervision/set-up within 7 day(s). 5.  Patient will perform all aspects of toileting with supervision/set-up within 7 day(s). 6.  Patient will participate in upper extremity therapeutic exercise/activities with supervision/set-up for 10 minutes within 7 day(s). Occupational Therapy TREATMENT Patient: Steffany Soto (52 y.o. male) Date: 11/28/2018 Diagnosis: Cough AVILA (dyspnea on exertion) Precautions: Fall Chart, occupational therapy assessment, plan of care, and goals were reviewed. ASSESSMENT: 
Patient received supine in bed. Patient agreeable to activity. Patient requires mod assistance for bed mobility using log roll technique. Once sitting, patient denies dizziness or lightheadedness and agreeable for activity with AE. Patient introduced to dressing stick, sock aid, and reacher. 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 Patient manages with increased time. Patient perform sit to stand at end of session with moderate assistance, and verbal cues for hand placement. Sit steps to [facility] and returned to supine with mod assist using log roll. Recommend patient discharge to rehab setting. Progression toward goals: 
[x]       Improving appropriately and progressing toward goals 
[]       Improving slowly and progressing toward goals []       Not making progress toward goals and plan of care will be adjusted PLAN: 
Patient continues to benefit from skilled intervention to address the above impairments. Continue treatment per established plan of care. Discharge Recommendations:  Rehab Further Equipment Recommendations for Discharge:  TBD SUBJECTIVE:  
Patient stated I like this.  OBJECTIVE DATA SUMMARY:  
Cognitive/Behavioral Status: 
Neurologic State: Alert Orientation Level: Oriented X4 Cognition: Follows commands Perception: Appears intact Perseveration: No perseveration noted Safety/Judgement: Awareness of environment Functional Mobility and Transfers for ADLs:Bed Mobility: 
Rolling: Minimum assistance Supine to Sit: Additional time; Moderate assistance Transfers: 
Sit to Stand: Moderate assistance Balance: 
Sitting: Intact Standing: With support Standing - Static: Good Standing - Dynamic : Fair ADL Intervention: Lower Body Dressing Assistance Socks: Minimum assistance(additional time) Leg Crossed Method Used: No 
Position Performed: Seated edge of bed Cues: Physical assistance;Verbal cues provided Adaptive Equipment Used: Sock aid;Reacher;Dressing stick Cognitive Retraining Safety/Judgement: Awareness of environment Neuro Re-Education: 
  
  
  
Therapeutic Exercises:  
Pain: 
Pain Scale 1: Numeric (0 - 10) Pain Intensity 1: 0 Activity Tolerance: VSS Please refer to the flowsheet for vital signs taken during this treatment. After treatment:  
[] Patient left in no apparent distress sitting up in chair 
[x] Patient left in no apparent distress in bed 
[x] Call bell left within reach [x] Nursing notified 
[x] Caregiver present [x] Bed alarm activated COMMUNICATION/COLLABORATION:  
The patients plan of care was discussed with: Physical Therapist and Registered Nurse SIRENA Brasher/L Time Calculation: 33 mins

## 2018-11-29 ENCOUNTER — PATIENT OUTREACH (OUTPATIENT)
Dept: INTERNAL MEDICINE CLINIC | Age: 83
End: 2018-11-29

## 2018-11-29 ENCOUNTER — HOSPITAL ENCOUNTER (OUTPATIENT)
Dept: REHABILITATION | Age: 83
End: 2018-12-13
Attending: PHYSICAL MEDICINE & REHABILITATION | Admitting: PHYSICAL MEDICINE & REHABILITATION

## 2018-11-29 VITALS
RESPIRATION RATE: 17 BRPM | HEIGHT: 67 IN | BODY MASS INDEX: 28.88 KG/M2 | SYSTOLIC BLOOD PRESSURE: 172 MMHG | WEIGHT: 184 LBS | DIASTOLIC BLOOD PRESSURE: 81 MMHG | HEART RATE: 66 BPM | OXYGEN SATURATION: 93 % | TEMPERATURE: 98.5 F

## 2018-11-29 PROCEDURE — 74011000250 HC RX REV CODE- 250: Performed by: INTERNAL MEDICINE

## 2018-11-29 PROCEDURE — 74011250637 HC RX REV CODE- 250/637: Performed by: INTERNAL MEDICINE

## 2018-11-29 RX ORDER — POLYETHYLENE GLYCOL 3350 17 G/17G
17 POWDER, FOR SOLUTION ORAL DAILY
Status: DISCONTINUED | OUTPATIENT
Start: 2018-11-29 | End: 2018-11-29 | Stop reason: HOSPADM

## 2018-11-29 RX ADMIN — CLOPIDOGREL BISULFATE 75 MG: 75 TABLET ORAL at 08:54

## 2018-11-29 RX ADMIN — GUAIFENESIN 600 MG: 600 TABLET, EXTENDED RELEASE ORAL at 08:54

## 2018-11-29 RX ADMIN — POLYETHYLENE GLYCOL 3350 17 G: 17 POWDER, FOR SOLUTION ORAL at 10:12

## 2018-11-29 RX ADMIN — ASPIRIN 81 MG: 81 TABLET ORAL at 08:54

## 2018-11-29 RX ADMIN — LOSARTAN POTASSIUM 100 MG: 50 TABLET, FILM COATED ORAL at 08:54

## 2018-11-29 RX ADMIN — AMLODIPINE BESYLATE 5 MG: 5 TABLET ORAL at 06:20

## 2018-11-29 RX ADMIN — Medication 10 ML: at 06:20

## 2018-11-29 NOTE — DISCHARGE SUMMARY
[address], [address] [facility] Tel: [phone] Physician Discharge Summary Patient ID:    Greer Lazar Age:              80 y.o.    : 1931 MRN:             [id number] PCP: Gabi Lindsey MD  
 
Admit date: 2018 Discharge date: 2018 Principal admission Diagnosis: 
 Cough Discharge Diagnoses: 
Principal Problem: 
  AVILA (dyspnea on exertion) (2018) Acute bronchitis (2018) Hypertension (2013) Hyperlipidemia (2013) BPH (benign prostatic hyperplasia) (2013) Aortic stenosis (1/3/2014) Presence of stent in coronary artery in patient with coronary artery disease (2017) Consults: Cardiology Hospital Course: Mr. Erin Gayle is a 80 y.o. admitted to [facility] and treated for the following: 
 
Acute bronchitis (2018): very unlikely pneumonia. He has remained. No SIRS criteria. Respiratory viral screen neg. He has remained stable with supportive care. Diet as tolerated   
  
CAD / Aortic stenosis POA: currently no anginal symptoms. Seen by cardiology. Troponin neg. Echocardiogram showed a normal LV function. AS moderate. No further testing at this time. Continue Asprin, Lipitor, Plavix and Metoprolol. Out patient follow up 
  
Physical debility/ generalized weakness POA: seems worse likely from current illness. Seen by PT, OT. He lives alone. Being discharged to rehab.  
  
HTN: BP stable. Continue Metoprolol, Losartan 
  
Dyslipidemia POA: Continue Lipitor 
  
BPH : No symptoms. Discharge Exam:   
Visit Vitals /72 (BP 1 Location: Right arm, BP Patient Position: At rest) Pulse 67 Temp 98.5 °F (36.9 °C) Resp 17 Ht 5' 7\" (1.702 m) Wt 83.5 kg (184 lb) SpO2 91% BMI 28.82 kg/m² General: well looking and stable patient in no acute distress Pulm: clear breath sounds without wheezes Card: no murmurs, normal S1, S2 without thrills, bruits Abd:    soft, non-tender, normoactive bowel sounds Skin: no rashes or ulcers, skin turgor is good Neuro: awake, alert and has a non focal  
 
Activity: Activity as tolerated Diet: Regular Diet Current Discharge Medication List  
  
CONTINUE these medications which have NOT CHANGED Details  
atorvastatin (LIPITOR) 40 mg tablet Take 40 mg by mouth every evening. aspirin delayed-release 81 mg tablet Take 81 mg by mouth daily. acetaminophen (TYLENOL) 500 mg tablet Take 1,000 mg by mouth two (2) times daily as needed for Pain. clopidogrel (PLAVIX) 75 mg tab Take 75 mg by mouth daily. metoprolol succinate (TOPROL-XL) 50 mg XL tablet Take 50 mg by mouth every evening. amLODIPine (NORVASC) 5 mg tablet Take 5 mg by mouth every morning. losartan (COZAAR) 100 mg tablet Take 1 Tab by mouth daily. Qty: 90 Tab, Refills: 0  
  
DOCOSAHEXANOIC ACID/EPA (FISH OIL PO) Take 1 Cap by mouth two (2) times a day. Associated Diagnoses: Lipid disorder  
  
multivitamin (ONE A DAY) tablet Take 1 Tab by mouth daily (after dinner). Qty: 90 Tab, Refills: 3 Associated Diagnoses: Cold extremity without peripheral vascular disease Follow-up Information Follow up With Specialties Details Why Contact Info Mitch Miller MD Family Practice Schedule an appointment as soon as possible for a visit to schedule follow up [address] [place] [address] 
[phone] Hallie Paniagua MD Cardiology Schedule an appointment as soon as possible for a visit to schedule your cardiology follow up [address] [facility] 
[phone] Follow-up tests or labs: None Discharge Condition: Stable Disposition: [facility] Time taken to arrange discharge:  35 minutes. Signed: 
Viktor Felipe MD     [facility] [place] [facility] 11/29/2018   7:24 AM

## 2018-11-29 NOTE — PROGRESS NOTES
Bedside and Verbal shift change report given to Ariella Shelley (oncoming nurse) by Kody Gutierrez (offgoing nurse). Report included the following information SBAR, Kardex, ED Summary, Intake/Output, MAR, Accordion and Recent Results.

## 2018-11-29 NOTE — PROGRESS NOTES
Transition of Care Coordination/Hospital to Post Acute Facility: 
  
Date/Time:  2018 4:41 PM 
 
Patient was admitted to [facility] - DX AVILA,   d/c to [facility] for continuation of care. Inpatient RRAT score: 8 Method of communication with care team :chart routing Nurse Navigator(NN) spoke with MARK ANTHONY Fernández (AINSLEY) to provide introduction to self and explanation of the Nurse Navigator Role. Verified name and  as patient identifiers; verified pt.admission. ACP:  
Does the patient have a current ACP (including DDNR):  Pt.has Advanced Directive Does the post acute facility have a copy of the patients ACP:  yes PCP/Specialist follow up:  
Future Appointments Date Time Provider Kyree Jacobson 3/20/2019  9:15 AM Easton Baker MD [facility] Opportunity to ask questions was provided. Contact information was provided for future reference or further questions. Will continue to monitor.

## 2018-11-29 NOTE — PROGRESS NOTES
Pharmacist Discharge Medication Reconciliation Discharge Provider:  Dr. Francisco Oakley Discharge Medications: My Medications CONTINUE taking these medications Instructions Each Dose to Equal Morning Noon Evening Bedtime  
acetaminophen 500 mg tablet Commonly known as:  TYLENOL Your last dose was: Your next dose is: Take 1,000 mg by mouth two (2) times daily as needed for Pain. 1000 mg 
  
  
  
  
  
aspirin delayed-release 81 mg tablet Your last dose was: Your next dose is: Take 81 mg by mouth daily. 81 mg FISH OIL PO Your last dose was: Your next dose is: Take 1 Cap by mouth two (2) times a day. 1 Cap LIPITOR 40 mg tablet Generic drug:  atorvastatin Your last dose was: Your next dose is: Take 40 mg by mouth every evening. 40 mg 
  
  
  
  
  
losartan 100 mg tablet Commonly known as:  COZAAR Your last dose was: Your next dose is: Take 1 Tab by mouth daily. 100 mg 
  
  
  
  
  
metoprolol succinate 50 mg XL tablet Commonly known as:  TOPROL-XL Your last dose was: Your next dose is: Take 50 mg by mouth every evening. 50 mg 
  
  
  
  
  
multivitamin tablet Commonly known as:  ONE A DAY Your last dose was: Your next dose is: Take 1 Tab by mouth daily (after dinner). 1 Tab NORVASC 5 mg tablet Generic drug:  amLODIPine Your last dose was: Your next dose is: Take 5 mg by mouth every morning. 5 mg PLAVIX 75 mg Tab Generic drug:  clopidogrel Your last dose was: Your next dose is: Take 75 mg by mouth daily. 75 mg The patient's chart, MAR, and AVS were reviewed by Meredith Bowers PharmD Contact: [phone]

## 2018-11-29 NOTE — DISCHARGE INSTRUCTIONS
TRANSFER  INSTRUCTIONS    NAME:            Christy Meraz   :  1931   MRN:  [id number]     Date/Time:  2018 7:23 AM    ADMIT DATE: 2018     TRANSFER DATE: 2018     DISPOSITION: [facility]    DISCHARGE DIAGNOSIS:  Principal Problem:    AVILA (dyspnea on exertion) (2018)    Acute bronchitis (2018)    Hypertension (2013)    Hyperlipidemia (2013)    BPH (benign prostatic hyperplasia) (2013)    Aortic stenosis (1/3/2014)    Presence of stent in coronary artery in patient with coronary artery disease (2017)    MEDICATIONS:    Refer to current medication profile. Recommended diet:  Regular Diet    Recommended activity: Activity as tolerated    Follow Up:    Facility physician and then Dr. Destinee Forbes MD: call to schedule follow up upon discharge from rehab    Information obtained by :  I understand that if any problems occur once I am at home I am to contact my physician. I understand and acknowledge receipt of the instructions indicated above.                                                                                                                                            Physician's or R.N.'s Signature                                                                  Date/Time                                                                                                                                              Patient or Representative Signature                                                          Date/Time

## 2018-11-29 NOTE — PROGRESS NOTES
Patient and patient's daughter given discharge instructions, PIV removed. Patient and daughter verbalize understanding of plan of care to go to [facility] when bed available. Patient's L arm skin tear dressing removed, skin tear found to be healing well. Mepilex applied to skin tear. Patient packed and ready for transport to [facility] in wheelchair when bed ready.

## 2018-11-29 NOTE — PROGRESS NOTES
CM notified by [facility] liaison that Pt has been accepted. CM will receive follow-up on bed availability. 10:30am: CM was informed that [facility] has an available bed today. Please call report to: [phone]. NN notified of discharge Care Management Interventions PCP Verified by CM: Yes(dr. castillo nn notified) Mode of Transport at Discharge: Self Transition of Care Consult (CM Consult): Discharge Planning Physical Therapy Consult: Yes Occupational Therapy Consult: Yes Speech Therapy Consult: No 
Current Support Network: Lives Alone Confirm Follow Up Transport: Family Plan discussed with Pt/Family/Caregiver: Yes Discharge Location Discharge Placement: Rehab Unit [facility]) Roselle Dubin, BS, [facility]

## 2018-11-29 NOTE — PROGRESS NOTES
Bedside and Verbal shift change report given to MARTINEZ Simmons (oncoming nurse) by Latisha Guillaume RN (offgoing nurse). Report included the following information SBAR, Kardex, Intake/Output, MAR, Accordion and Recent Results.

## 2018-11-30 LAB
25(OH)D3 SERPL-MCNC: 26.2 NG/ML (ref 30–100)
ALBUMIN SERPL-MCNC: 3.2 G/DL (ref 3.5–5)
ALBUMIN/GLOB SERPL: 0.9 {RATIO} (ref 1.1–2.2)
ALP SERPL-CCNC: 110 U/L (ref 45–117)
ALT SERPL-CCNC: 27 U/L (ref 12–78)
ANION GAP SERPL CALC-SCNC: 10 MMOL/L (ref 5–15)
AST SERPL-CCNC: 26 U/L (ref 15–37)
BILIRUB SERPL-MCNC: 0.4 MG/DL (ref 0.2–1)
BUN SERPL-MCNC: 30 MG/DL (ref 6–20)
BUN/CREAT SERPL: 23 (ref 12–20)
CALCIUM SERPL-MCNC: 9.5 MG/DL (ref 8.5–10.1)
CHLORIDE SERPL-SCNC: 106 MMOL/L (ref 97–108)
CO2 SERPL-SCNC: 23 MMOL/L (ref 21–32)
CREAT SERPL-MCNC: 1.32 MG/DL (ref 0.7–1.3)
ERYTHROCYTE [DISTWIDTH] IN BLOOD BY AUTOMATED COUNT: 13.2 % (ref 11.5–14.5)
GLOBULIN SER CALC-MCNC: 3.7 G/DL (ref 2–4)
GLUCOSE SERPL-MCNC: 96 MG/DL (ref 65–100)
HCT VFR BLD AUTO: 38 % (ref 36.6–50.3)
HGB BLD-MCNC: 12.5 G/DL (ref 12.1–17)
MCH RBC QN AUTO: 30 PG (ref 26–34)
MCHC RBC AUTO-ENTMCNC: 32.9 G/DL (ref 30–36.5)
MCV RBC AUTO: 91.3 FL (ref 80–99)
NRBC # BLD: 0 K/UL (ref 0–0.01)
NRBC BLD-RTO: 0 PER 100 WBC
PLATELET # BLD AUTO: 226 K/UL (ref 150–400)
PMV BLD AUTO: 11.3 FL (ref 8.9–12.9)
POTASSIUM SERPL-SCNC: 4.1 MMOL/L (ref 3.5–5.1)
PROT SERPL-MCNC: 6.9 G/DL (ref 6.4–8.2)
RBC # BLD AUTO: 4.16 M/UL (ref 4.1–5.7)
SODIUM SERPL-SCNC: 139 MMOL/L (ref 136–145)
WBC # BLD AUTO: 7.6 K/UL (ref 4.1–11.1)

## 2018-11-30 PROCEDURE — 85027 COMPLETE CBC AUTOMATED: CPT

## 2018-11-30 PROCEDURE — 36415 COLL VENOUS BLD VENIPUNCTURE: CPT

## 2018-11-30 PROCEDURE — 82306 VITAMIN D 25 HYDROXY: CPT

## 2018-11-30 PROCEDURE — 80053 COMPREHEN METABOLIC PANEL: CPT

## 2018-12-02 LAB
BACTERIA SPEC CULT: NORMAL
SERVICE CMNT-IMP: NORMAL

## 2018-12-03 LAB
ANION GAP SERPL CALC-SCNC: 13 MMOL/L (ref 5–15)
BUN SERPL-MCNC: 52 MG/DL (ref 6–20)
BUN/CREAT SERPL: 33 (ref 12–20)
CALCIUM SERPL-MCNC: 9.3 MG/DL (ref 8.5–10.1)
CHLORIDE SERPL-SCNC: 106 MMOL/L (ref 97–108)
CO2 SERPL-SCNC: 20 MMOL/L (ref 21–32)
CREAT SERPL-MCNC: 1.6 MG/DL (ref 0.7–1.3)
ERYTHROCYTE [DISTWIDTH] IN BLOOD BY AUTOMATED COUNT: 13.2 % (ref 11.5–14.5)
GLUCOSE SERPL-MCNC: 109 MG/DL (ref 65–100)
HCT VFR BLD AUTO: 35.8 % (ref 36.6–50.3)
HGB BLD-MCNC: 11.9 G/DL (ref 12.1–17)
MCH RBC QN AUTO: 30.1 PG (ref 26–34)
MCHC RBC AUTO-ENTMCNC: 33.2 G/DL (ref 30–36.5)
MCV RBC AUTO: 90.4 FL (ref 80–99)
NRBC # BLD: 0 K/UL (ref 0–0.01)
NRBC BLD-RTO: 0 PER 100 WBC
PLATELET # BLD AUTO: 195 K/UL (ref 150–400)
PMV BLD AUTO: 11.4 FL (ref 8.9–12.9)
POTASSIUM SERPL-SCNC: 4.2 MMOL/L (ref 3.5–5.1)
RBC # BLD AUTO: 3.96 M/UL (ref 4.1–5.7)
SODIUM SERPL-SCNC: 139 MMOL/L (ref 136–145)
WBC # BLD AUTO: 5.8 K/UL (ref 4.1–11.1)

## 2018-12-03 PROCEDURE — 36415 COLL VENOUS BLD VENIPUNCTURE: CPT

## 2018-12-03 PROCEDURE — 80048 BASIC METABOLIC PNL TOTAL CA: CPT

## 2018-12-03 PROCEDURE — 85027 COMPLETE CBC AUTOMATED: CPT

## 2018-12-06 LAB
ANION GAP SERPL CALC-SCNC: 13 MMOL/L (ref 5–15)
BUN SERPL-MCNC: 42 MG/DL (ref 6–20)
BUN/CREAT SERPL: 32 (ref 12–20)
CALCIUM SERPL-MCNC: 9.6 MG/DL (ref 8.5–10.1)
CHLORIDE SERPL-SCNC: 109 MMOL/L (ref 97–108)
CO2 SERPL-SCNC: 20 MMOL/L (ref 21–32)
CREAT SERPL-MCNC: 1.32 MG/DL (ref 0.7–1.3)
ERYTHROCYTE [DISTWIDTH] IN BLOOD BY AUTOMATED COUNT: 13.5 % (ref 11.5–14.5)
GLUCOSE SERPL-MCNC: 97 MG/DL (ref 65–100)
HCT VFR BLD AUTO: 37.8 % (ref 36.6–50.3)
HGB BLD-MCNC: 12.3 G/DL (ref 12.1–17)
MCH RBC QN AUTO: 29.9 PG (ref 26–34)
MCHC RBC AUTO-ENTMCNC: 32.5 G/DL (ref 30–36.5)
MCV RBC AUTO: 92 FL (ref 80–99)
NRBC # BLD: 0 K/UL (ref 0–0.01)
NRBC BLD-RTO: 0 PER 100 WBC
PLATELET # BLD AUTO: 226 K/UL (ref 150–400)
PMV BLD AUTO: 11.8 FL (ref 8.9–12.9)
POTASSIUM SERPL-SCNC: 4.3 MMOL/L (ref 3.5–5.1)
RBC # BLD AUTO: 4.11 M/UL (ref 4.1–5.7)
SODIUM SERPL-SCNC: 142 MMOL/L (ref 136–145)
WBC # BLD AUTO: 6.6 K/UL (ref 4.1–11.1)

## 2018-12-06 PROCEDURE — 36415 COLL VENOUS BLD VENIPUNCTURE: CPT

## 2018-12-06 PROCEDURE — 80048 BASIC METABOLIC PNL TOTAL CA: CPT

## 2018-12-06 PROCEDURE — 85027 COMPLETE CBC AUTOMATED: CPT

## 2018-12-10 LAB
ANION GAP SERPL CALC-SCNC: 10 MMOL/L (ref 5–15)
BUN SERPL-MCNC: 49 MG/DL (ref 6–20)
BUN/CREAT SERPL: 33 (ref 12–20)
CALCIUM SERPL-MCNC: 9.1 MG/DL (ref 8.5–10.1)
CHLORIDE SERPL-SCNC: 109 MMOL/L (ref 97–108)
CO2 SERPL-SCNC: 22 MMOL/L (ref 21–32)
CREAT SERPL-MCNC: 1.49 MG/DL (ref 0.7–1.3)
ERYTHROCYTE [DISTWIDTH] IN BLOOD BY AUTOMATED COUNT: 13.4 % (ref 11.5–14.5)
GLUCOSE SERPL-MCNC: 90 MG/DL (ref 65–100)
HCT VFR BLD AUTO: 35.1 % (ref 36.6–50.3)
HGB BLD-MCNC: 11.2 G/DL (ref 12.1–17)
MCH RBC QN AUTO: 29.2 PG (ref 26–34)
MCHC RBC AUTO-ENTMCNC: 31.9 G/DL (ref 30–36.5)
MCV RBC AUTO: 91.6 FL (ref 80–99)
NRBC # BLD: 0 K/UL (ref 0–0.01)
NRBC BLD-RTO: 0 PER 100 WBC
PLATELET # BLD AUTO: 200 K/UL (ref 150–400)
PMV BLD AUTO: 11.7 FL (ref 8.9–12.9)
POTASSIUM SERPL-SCNC: 4.2 MMOL/L (ref 3.5–5.1)
RBC # BLD AUTO: 3.83 M/UL (ref 4.1–5.7)
SODIUM SERPL-SCNC: 141 MMOL/L (ref 136–145)
WBC # BLD AUTO: 4.8 K/UL (ref 4.1–11.1)

## 2018-12-10 PROCEDURE — 36415 COLL VENOUS BLD VENIPUNCTURE: CPT

## 2018-12-10 PROCEDURE — 80048 BASIC METABOLIC PNL TOTAL CA: CPT

## 2018-12-10 PROCEDURE — 85027 COMPLETE CBC AUTOMATED: CPT

## 2018-12-11 ENCOUNTER — PATIENT OUTREACH (OUTPATIENT)
Dept: INTERNAL MEDICINE CLINIC | Age: 83
End: 2018-12-11

## 2018-12-11 LAB
ALBUMIN SERPL-MCNC: 3 G/DL (ref 3.5–5)
ALBUMIN/GLOB SERPL: 0.8 {RATIO} (ref 1.1–2.2)
ALP SERPL-CCNC: 115 U/L (ref 45–117)
ALT SERPL-CCNC: 31 U/L (ref 12–78)
ANION GAP SERPL CALC-SCNC: 12 MMOL/L (ref 5–15)
AST SERPL-CCNC: 25 U/L (ref 15–37)
BASOPHILS # BLD: 0.1 K/UL (ref 0–0.1)
BASOPHILS NFR BLD: 1 % (ref 0–1)
BILIRUB SERPL-MCNC: 0.3 MG/DL (ref 0.2–1)
BUN SERPL-MCNC: 39 MG/DL (ref 6–20)
BUN/CREAT SERPL: 28 (ref 12–20)
CALCIUM SERPL-MCNC: 8.9 MG/DL (ref 8.5–10.1)
CHLORIDE SERPL-SCNC: 108 MMOL/L (ref 97–108)
CO2 SERPL-SCNC: 20 MMOL/L (ref 21–32)
CREAT SERPL-MCNC: 1.41 MG/DL (ref 0.7–1.3)
DIFFERENTIAL METHOD BLD: ABNORMAL
EOSINOPHIL # BLD: 0.2 K/UL (ref 0–0.4)
EOSINOPHIL NFR BLD: 3 % (ref 0–7)
ERYTHROCYTE [DISTWIDTH] IN BLOOD BY AUTOMATED COUNT: 13.5 % (ref 11.5–14.5)
GLOBULIN SER CALC-MCNC: 3.7 G/DL (ref 2–4)
GLUCOSE SERPL-MCNC: 156 MG/DL (ref 65–100)
HCT VFR BLD AUTO: 34.5 % (ref 36.6–50.3)
HGB BLD-MCNC: 11.4 G/DL (ref 12.1–17)
IMM GRANULOCYTES # BLD: 0 K/UL (ref 0–0.04)
IMM GRANULOCYTES NFR BLD AUTO: 0 % (ref 0–0.5)
LYMPHOCYTES # BLD: 1.4 K/UL (ref 0.8–3.5)
LYMPHOCYTES NFR BLD: 27 % (ref 12–49)
MCH RBC QN AUTO: 30.3 PG (ref 26–34)
MCHC RBC AUTO-ENTMCNC: 33 G/DL (ref 30–36.5)
MCV RBC AUTO: 91.8 FL (ref 80–99)
MONOCYTES # BLD: 0.4 K/UL (ref 0–1)
MONOCYTES NFR BLD: 8 % (ref 5–13)
NEUTS SEG # BLD: 3.2 K/UL (ref 1.8–8)
NEUTS SEG NFR BLD: 61 % (ref 32–75)
NRBC # BLD: 0 K/UL (ref 0–0.01)
NRBC BLD-RTO: 0 PER 100 WBC
PLATELET # BLD AUTO: 208 K/UL (ref 150–400)
PMV BLD AUTO: 11.1 FL (ref 8.9–12.9)
POTASSIUM SERPL-SCNC: 4.1 MMOL/L (ref 3.5–5.1)
PROT SERPL-MCNC: 6.7 G/DL (ref 6.4–8.2)
RBC # BLD AUTO: 3.76 M/UL (ref 4.1–5.7)
SODIUM SERPL-SCNC: 140 MMOL/L (ref 136–145)
WBC # BLD AUTO: 5.3 K/UL (ref 4.1–11.1)

## 2018-12-11 PROCEDURE — 36415 COLL VENOUS BLD VENIPUNCTURE: CPT

## 2018-12-11 PROCEDURE — 85025 COMPLETE CBC W/AUTO DIFF WBC: CPT

## 2018-12-11 PROCEDURE — 80053 COMPREHEN METABOLIC PANEL: CPT

## 2018-12-11 NOTE — PROGRESS NOTES
NN called [facility]. [facility]) f/u pt. Planned d/c home; spoke with MARK ANTHONY Dumas (AINSLEY) reports pt. Possible d/c on 12/13 to [facility]. NN will f/u 12/13. -[address]

## 2018-12-13 ENCOUNTER — PATIENT OUTREACH (OUTPATIENT)
Dept: INTERNAL MEDICINE CLINIC | Age: 83
End: 2018-12-13

## 2018-12-13 LAB
ANION GAP SERPL CALC-SCNC: 13 MMOL/L (ref 5–15)
BUN SERPL-MCNC: 39 MG/DL (ref 6–20)
BUN/CREAT SERPL: 28 (ref 12–20)
CALCIUM SERPL-MCNC: 9.4 MG/DL (ref 8.5–10.1)
CHLORIDE SERPL-SCNC: 111 MMOL/L (ref 97–108)
CO2 SERPL-SCNC: 20 MMOL/L (ref 21–32)
CREAT SERPL-MCNC: 1.37 MG/DL (ref 0.7–1.3)
ERYTHROCYTE [DISTWIDTH] IN BLOOD BY AUTOMATED COUNT: 13.4 % (ref 11.5–14.5)
GLUCOSE SERPL-MCNC: 103 MG/DL (ref 65–100)
HCT VFR BLD AUTO: 33.2 % (ref 36.6–50.3)
HGB BLD-MCNC: 10.7 G/DL (ref 12.1–17)
MCH RBC QN AUTO: 29.6 PG (ref 26–34)
MCHC RBC AUTO-ENTMCNC: 32.2 G/DL (ref 30–36.5)
MCV RBC AUTO: 91.7 FL (ref 80–99)
NRBC # BLD: 0 K/UL (ref 0–0.01)
NRBC BLD-RTO: 0 PER 100 WBC
PLATELET # BLD AUTO: 189 K/UL (ref 150–400)
PMV BLD AUTO: 11.3 FL (ref 8.9–12.9)
POTASSIUM SERPL-SCNC: 4.3 MMOL/L (ref 3.5–5.1)
RBC # BLD AUTO: 3.62 M/UL (ref 4.1–5.7)
SODIUM SERPL-SCNC: 144 MMOL/L (ref 136–145)
WBC # BLD AUTO: 4.7 K/UL (ref 4.1–11.1)

## 2018-12-13 PROCEDURE — 85027 COMPLETE CBC AUTOMATED: CPT

## 2018-12-13 PROCEDURE — 80048 BASIC METABOLIC PNL TOTAL CA: CPT

## 2018-12-13 PROCEDURE — 36415 COLL VENOUS BLD VENIPUNCTURE: CPT

## 2018-12-13 NOTE — PROGRESS NOTES
NN spoke with Mr. Favian Morales, reports pt.d/c  to [facility]; NN provide Mr. Favian Morales with fax # to send d/c summary and med. list/orders. -    Transition of Care Coordination/Hospital to Post Acute Facility:     Date/Time:  2018 4:21 PM    Patient was admitted to [facility] on   and discharged on  for  DX. LATESHA. Patient was transferred to [facility] for continuation of care    Method of communication with care team : phone     Nurse Navigator(NN) spoke with Terri Ponce to provide introduction to self and explanation of the Nurse Navigator Role. Verified name and  as patient identifiers. [facility] staff Doroteo Dubon) ([phone]) verified admission. PCP/Specialist follow up:   Future Appointments   Date Time Provider Kyree Jacobson   3/20/2019  9:15 AM Mikel Rodríguez MD [address]        Opportunity to ask questions was provided. Contact information was provided for future reference or further questions. Will continue to monitor.

## 2018-12-14 ENCOUNTER — PATIENT OUTREACH (OUTPATIENT)
Dept: INTERNAL MEDICINE CLINIC | Age: 83
End: 2018-12-14

## 2018-12-14 NOTE — PROGRESS NOTES
NN received call from Ariadne Last (DON), reports pt. Admission for continue with Rehab, under the care of Dr. Alexander Nevarez. NN contact information and fax provide to send discharge information for NN f/u with pt.transition home. NN will f/u with pt.progress weekly. -[address]

## 2018-12-27 ENCOUNTER — PATIENT OUTREACH (OUTPATIENT)
Dept: INTERNAL MEDICINE CLINIC | Age: 83
End: 2018-12-27

## 2018-12-27 NOTE — PROGRESS NOTES
NN contacted SNF, [facility] ([phone]) nursing staff, spoke with Ms. Gomez,JOSE, reports pt. is doing very well at he facility.  reports she is fairly new with taking care of pt.and not aware of LOS for rehab.at this time. NN contact information provided to be notified when pt. D/c home. NN will f/u with pt.in 1 week. -[address]

## 2019-01-03 ENCOUNTER — PATIENT OUTREACH (OUTPATIENT)
Dept: INTERNAL MEDICINE CLINIC | Age: 84
End: 2019-01-03

## 2019-01-03 NOTE — PROGRESS NOTES
NN spoke with Ok Santoyo at . Citlaly Cedillo at the center room [facility]). NN will f/u with pt.progress and LOS. -[address]

## 2019-07-02 ENCOUNTER — HOSPITAL ENCOUNTER (OUTPATIENT)
Dept: GENERAL RADIOLOGY | Age: 84
Discharge: HOME | End: 2019-07-02
Payer: MEDICARE

## 2019-07-02 DIAGNOSIS — I35.0 AORTIC STENOSIS: ICD-10-CM

## 2019-07-02 DIAGNOSIS — I25.118 CORONARY ARTERY DISEASE WITH STABLE ANGINA PECTORIS (HCC): ICD-10-CM

## 2019-07-02 PROCEDURE — 71046 X-RAY EXAM CHEST 2 VIEWS: CPT

## 2019-07-10 ENCOUNTER — HOSPITAL ENCOUNTER (OUTPATIENT)
Age: 84
Discharge: HOME | End: 2019-07-10
Attending: INTERNAL MEDICINE | Admitting: INTERNAL MEDICINE
Payer: MEDICARE

## 2019-07-10 VITALS
OXYGEN SATURATION: 95 % | HEIGHT: 67 IN | DIASTOLIC BLOOD PRESSURE: 54 MMHG | TEMPERATURE: 97.6 F | BODY MASS INDEX: 29.82 KG/M2 | SYSTOLIC BLOOD PRESSURE: 112 MMHG | WEIGHT: 190 LBS | HEART RATE: 54 BPM

## 2019-07-10 DIAGNOSIS — R07.9 CHEST PAIN, UNSPECIFIED TYPE: ICD-10-CM

## 2019-07-10 PROCEDURE — 77030029065 HC DRSG HEMO QCLOT ZMED -B: Performed by: INTERNAL MEDICINE

## 2019-07-10 PROCEDURE — 99153 MOD SED SAME PHYS/QHP EA: CPT | Performed by: INTERNAL MEDICINE

## 2019-07-10 PROCEDURE — C1894 INTRO/SHEATH, NON-LASER: HCPCS | Performed by: INTERNAL MEDICINE

## 2019-07-10 PROCEDURE — 74011250636 HC RX REV CODE- 250/636

## 2019-07-10 PROCEDURE — C1760 CLOSURE DEV, VASC: HCPCS | Performed by: INTERNAL MEDICINE

## 2019-07-10 PROCEDURE — C1769 GUIDE WIRE: HCPCS | Performed by: INTERNAL MEDICINE

## 2019-07-10 PROCEDURE — 74011636320 HC RX REV CODE- 636/320: Performed by: INTERNAL MEDICINE

## 2019-07-10 PROCEDURE — 74011250636 HC RX REV CODE- 250/636: Performed by: INTERNAL MEDICINE

## 2019-07-10 PROCEDURE — 93458 L HRT ARTERY/VENTRICLE ANGIO: CPT | Performed by: INTERNAL MEDICINE

## 2019-07-10 PROCEDURE — 99152 MOD SED SAME PHYS/QHP 5/>YRS: CPT | Performed by: INTERNAL MEDICINE

## 2019-07-10 RX ORDER — MIDAZOLAM HYDROCHLORIDE 1 MG/ML
INJECTION, SOLUTION INTRAMUSCULAR; INTRAVENOUS AS NEEDED
Status: DISCONTINUED | OUTPATIENT
Start: 2019-07-10 | End: 2019-07-10 | Stop reason: HOSPADM

## 2019-07-10 RX ORDER — GABAPENTIN 300 MG/1
300 CAPSULE ORAL 2 TIMES DAILY
COMMUNITY

## 2019-07-10 RX ORDER — SODIUM CHLORIDE 0.9 % (FLUSH) 0.9 %
5-40 SYRINGE (ML) INJECTION EVERY 8 HOURS
Status: DISCONTINUED | OUTPATIENT
Start: 2019-07-10 | End: 2019-07-10 | Stop reason: HOSPADM

## 2019-07-10 RX ORDER — SODIUM CHLORIDE 9 MG/ML
125 INJECTION, SOLUTION INTRAVENOUS CONTINUOUS
Status: DISPENSED | OUTPATIENT
Start: 2019-07-10 | End: 2019-07-10

## 2019-07-10 RX ORDER — HEPARIN SODIUM 200 [USP'U]/100ML
INJECTION, SOLUTION INTRAVENOUS
Status: COMPLETED | OUTPATIENT
Start: 2019-07-10 | End: 2019-07-10

## 2019-07-10 RX ORDER — LIDOCAINE HYDROCHLORIDE 10 MG/ML
INJECTION, SOLUTION EPIDURAL; INFILTRATION; INTRACAUDAL; PERINEURAL AS NEEDED
Status: DISCONTINUED | OUTPATIENT
Start: 2019-07-10 | End: 2019-07-10 | Stop reason: HOSPADM

## 2019-07-10 RX ORDER — FENTANYL CITRATE 50 UG/ML
INJECTION, SOLUTION INTRAMUSCULAR; INTRAVENOUS AS NEEDED
Status: DISCONTINUED | OUTPATIENT
Start: 2019-07-10 | End: 2019-07-10 | Stop reason: HOSPADM

## 2019-07-10 RX ORDER — SODIUM CHLORIDE 0.9 % (FLUSH) 0.9 %
5-40 SYRINGE (ML) INJECTION AS NEEDED
Status: DISCONTINUED | OUTPATIENT
Start: 2019-07-10 | End: 2019-07-10 | Stop reason: HOSPADM

## 2019-07-10 RX ORDER — NALOXONE HYDROCHLORIDE 0.4 MG/ML
0.4 INJECTION, SOLUTION INTRAMUSCULAR; INTRAVENOUS; SUBCUTANEOUS AS NEEDED
Status: DISCONTINUED | OUTPATIENT
Start: 2019-07-10 | End: 2019-07-10 | Stop reason: HOSPADM

## 2019-07-10 RX ORDER — LANOLIN ALCOHOL/MO/W.PET/CERES
3 CREAM (GRAM) TOPICAL
COMMUNITY
End: 2021-01-01

## 2019-07-10 RX ORDER — DIPHENHYDRAMINE HYDROCHLORIDE 50 MG/ML
25 INJECTION, SOLUTION INTRAMUSCULAR; INTRAVENOUS
Status: DISCONTINUED | OUTPATIENT
Start: 2019-07-10 | End: 2019-07-10 | Stop reason: HOSPADM

## 2019-07-10 RX ADMIN — SODIUM CHLORIDE 125 ML/HR: 900 INJECTION, SOLUTION INTRAVENOUS at 14:00

## 2019-07-10 NOTE — Clinical Note
Single view of the left ventricle obtained using power injection. Total volume = 24 mL. Rate = 8 mL/sec. Pressure = 900 PSI.

## 2019-07-10 NOTE — PROGRESS NOTES
1331: Pt has returned to [facility]. Site is [facility]. No complaints at this time.

## 2019-07-10 NOTE — DISCHARGE INSTRUCTIONS
[address], [address]    [phone]  [place], [place] [address]    [email or web address]    Patient Discharge Instructions    Angie Ross / [id number] : 1931    Admitted 7/10/2019 Discharged 7/10/2019     · It is important that you take the medication exactly as they are prescribed. · Keep your medication in the bottles provided by the pharmacist and keep a list of the medication names, dosages, and times to be taken in your wallet. · Do not take other medications without consulting your doctor. BRING ALL OF YOUR MEDICINES or a list of medicines with dosages TO YOUR OFFICE VISIT with Dr. Elvin Mccollum. Cardiac Catheterization  Discharge Instructions     Do not drive, operate any machinery, or sign any legal documents for 24 hours after your procedure. You must have someone to drive you home.  You may take a shower 24 hours after your cardiac catheterization. Be sure to get the dressing wet and then remove it; gently wash the area with warm soapy water. Pat dry and leave open to air. To help prevent infections, be sure to keep the cath site clean and dry. No lotions, creams, powders, ointments, etc. in the cath site for approximately 1 week.  Do not take a tub bath, get in a hot tub or swimming pool for approximately 5 days or until the cath site is completely healed.  No strenuous activity or heavy lifting over 10 lbs. for 7 days.  Drink plenty of fluids for 24-48 hours after your cath to flush the contrast dye from your kidneys. No alcoholic beverages for 24 hours. You may resume your previous diet (low fat, low cholesterol) after your cath.  After your cath, some bruising or discomfort is common during the healing process. Tylenol, 1-2 tablets every 6 hours as needed, is recommended if you experience any discomfort.   If you experience any signs or symptoms of infection such as fever, chills, or poorly healing incision, persistent tenderness or swelling in the groin, redness and/or warmth to the touch, numbness, significant tingling or pain at the groin site or affected extremity, rash, drainage from the cath site, or if the leg feels tight or swollen, call your physician right away.  If bleeding at the cath site occurs, take a clean gauze pad and apply direct pressure to the groin just above the puncture site. Call 911 immediately, and continue to apply direct pressure until an ambulance gets to your location. If you are smoking, please stop. Smoking Cessation Program is a free, phone/text/email based, smoking cessation program. The program is individualized to meet each patient's needs. To enroll use this link [email or web address]      Follow-up:   Follow-up Information     Follow up With Specialties Details Why Contact Info    As directed by VCS              Information obtained by :  I understand that if any problems occur once I am at home I am to contact my physician. I understand and acknowledge receipt of the instructions indicated above. R.N.'s Signature                                                                  Date/Time                                                                                                                                              Patient or Representative Signature                                                          Date/Time      Denise Hewitt MD      [address]    [phone]  [place], [address] [facility]    [email or web address]

## 2019-07-10 NOTE — PROGRESS NOTES
7/10/2019 1:40 PM  Patient without complaints. Last VS:   Visit Vitals  /73 (BP 1 Location: Left arm, BP Patient Position: Supine)   Pulse 75   Temp 97.6 °F (36.4 °C)   Ht 5' 7\" (1.702 m)   Wt 86.2 kg (190 lb)   BMI 29.76 kg/m²     Cath site without hematoma, bleeding or new bruit. Distal pulses at baseline. Continue current plan of care. Results of cath discussed with patient and available family members.

## 2019-07-10 NOTE — Clinical Note
TRANSFER - OUT REPORT:  
 
Verbal report given to: Kane Walker RN. Report consisted of patient's Situation, Background, Assessment and  
Recommendations(SBAR). Opportunity for questions and clarification was provided. Patient transported with a Registered Nurse and [address] [place] [address] / [facility]. Patient transported to: I[facility].

## 2021-01-01 ENCOUNTER — APPOINTMENT (OUTPATIENT)
Dept: NON INVASIVE DIAGNOSTICS | Age: 86
DRG: 260 | End: 2021-01-01
Attending: NURSE PRACTITIONER
Payer: MEDICARE

## 2021-01-01 ENCOUNTER — APPOINTMENT (OUTPATIENT)
Dept: CT IMAGING | Age: 86
DRG: 260 | End: 2021-01-01
Attending: INTERNAL MEDICINE
Payer: MEDICARE

## 2021-01-01 ENCOUNTER — APPOINTMENT (OUTPATIENT)
Dept: CT IMAGING | Age: 86
DRG: 260 | End: 2021-01-01
Attending: NURSE PRACTITIONER
Payer: MEDICARE

## 2021-01-01 ENCOUNTER — APPOINTMENT (OUTPATIENT)
Dept: GENERAL RADIOLOGY | Age: 86
DRG: 260 | End: 2021-01-01
Attending: INTERNAL MEDICINE
Payer: MEDICARE

## 2021-01-01 ENCOUNTER — HOSPITAL ENCOUNTER (INPATIENT)
Age: 86
LOS: 9 days | DRG: 260 | End: 2021-04-30
Attending: EMERGENCY MEDICINE | Admitting: HOSPITALIST
Payer: MEDICARE

## 2021-01-01 ENCOUNTER — APPOINTMENT (OUTPATIENT)
Dept: GENERAL RADIOLOGY | Age: 86
DRG: 260 | End: 2021-01-01
Attending: EMERGENCY MEDICINE
Payer: MEDICARE

## 2021-01-01 ENCOUNTER — APPOINTMENT (OUTPATIENT)
Dept: ULTRASOUND IMAGING | Age: 86
DRG: 260 | End: 2021-01-01
Attending: INTERNAL MEDICINE
Payer: MEDICARE

## 2021-01-01 ENCOUNTER — APPOINTMENT (OUTPATIENT)
Dept: GENERAL RADIOLOGY | Age: 86
DRG: 260 | End: 2021-01-01
Attending: NURSE PRACTITIONER
Payer: MEDICARE

## 2021-01-01 VITALS
WEIGHT: 199.08 LBS | TEMPERATURE: 98.6 F | HEART RATE: 117 BPM | DIASTOLIC BLOOD PRESSURE: 71 MMHG | HEIGHT: 67 IN | SYSTOLIC BLOOD PRESSURE: 148 MMHG | OXYGEN SATURATION: 94 % | RESPIRATION RATE: 28 BRPM | BODY MASS INDEX: 31.25 KG/M2

## 2021-01-01 DIAGNOSIS — D72.829 LEUKOCYTOSIS, UNSPECIFIED TYPE: ICD-10-CM

## 2021-01-01 DIAGNOSIS — I44.2 CHB (COMPLETE HEART BLOCK) (HCC): ICD-10-CM

## 2021-01-01 DIAGNOSIS — B95.5 BACTEREMIA DUE TO STREPTOCOCCUS: ICD-10-CM

## 2021-01-01 DIAGNOSIS — R77.8 ELEVATED TROPONIN: ICD-10-CM

## 2021-01-01 DIAGNOSIS — R74.8 ELEVATED LIVER ENZYMES: ICD-10-CM

## 2021-01-01 DIAGNOSIS — R78.81 BACTEREMIA DUE TO STREPTOCOCCUS: ICD-10-CM

## 2021-01-01 DIAGNOSIS — R57.0 CARDIOGENIC SHOCK (HCC): ICD-10-CM

## 2021-01-01 DIAGNOSIS — I44.2 HEART BLOCK AV THIRD DEGREE (HCC): ICD-10-CM

## 2021-01-01 DIAGNOSIS — I44.2 THIRD DEGREE HEART BLOCK (HCC): Primary | ICD-10-CM

## 2021-01-01 DIAGNOSIS — Z71.89 GOALS OF CARE, COUNSELING/DISCUSSION: ICD-10-CM

## 2021-01-01 DIAGNOSIS — I46.9 CARDIAC ARREST (HCC): ICD-10-CM

## 2021-01-01 DIAGNOSIS — R00.1 BRADYCARDIA: ICD-10-CM

## 2021-01-01 DIAGNOSIS — N17.9 AKI (ACUTE KIDNEY INJURY) (HCC): ICD-10-CM

## 2021-01-01 DIAGNOSIS — Z71.89 DNR (DO NOT RESUSCITATE) DISCUSSION: ICD-10-CM

## 2021-01-01 DIAGNOSIS — J96.01 ACUTE RESPIRATORY FAILURE WITH HYPOXIA (HCC): ICD-10-CM

## 2021-01-01 LAB
ALBUMIN SERPL-MCNC: 2.1 G/DL (ref 3.5–5)
ALBUMIN SERPL-MCNC: 2.2 G/DL (ref 3.5–5)
ALBUMIN SERPL-MCNC: 2.5 G/DL (ref 3.5–5)
ALBUMIN SERPL-MCNC: 3 G/DL (ref 3.5–5)
ALBUMIN SERPL-MCNC: 3.5 G/DL (ref 3.5–5)
ALBUMIN/GLOB SERPL: 0.5 {RATIO} (ref 1.1–2.2)
ALBUMIN/GLOB SERPL: 0.5 {RATIO} (ref 1.1–2.2)
ALBUMIN/GLOB SERPL: 0.7 {RATIO} (ref 1.1–2.2)
ALBUMIN/GLOB SERPL: 0.8 {RATIO} (ref 1.1–2.2)
ALBUMIN/GLOB SERPL: 0.9 {RATIO} (ref 1.1–2.2)
ALP SERPL-CCNC: 114 U/L (ref 45–117)
ALP SERPL-CCNC: 130 U/L (ref 45–117)
ALP SERPL-CCNC: 141 U/L (ref 45–117)
ALP SERPL-CCNC: 154 U/L (ref 45–117)
ALP SERPL-CCNC: 323 U/L (ref 45–117)
ALT SERPL-CCNC: 122 U/L (ref 12–78)
ALT SERPL-CCNC: 34 U/L (ref 12–78)
ALT SERPL-CCNC: 39 U/L (ref 12–78)
ALT SERPL-CCNC: 42 U/L (ref 12–78)
ALT SERPL-CCNC: 48 U/L (ref 12–78)
ANION GAP SERPL CALC-SCNC: 4 MMOL/L (ref 5–15)
ANION GAP SERPL CALC-SCNC: 5 MMOL/L (ref 5–15)
ANION GAP SERPL CALC-SCNC: 6 MMOL/L (ref 5–15)
ANION GAP SERPL CALC-SCNC: 7 MMOL/L (ref 5–15)
ANION GAP SERPL CALC-SCNC: 7 MMOL/L (ref 5–15)
ANION GAP SERPL CALC-SCNC: 8 MMOL/L (ref 5–15)
ANION GAP SERPL CALC-SCNC: 8 MMOL/L (ref 5–15)
ANION GAP SERPL CALC-SCNC: 9 MMOL/L (ref 5–15)
ANION GAP SERPL CALC-SCNC: 9 MMOL/L (ref 5–15)
APPEARANCE UR: CLEAR
APTT PPP: 27.5 SEC (ref 22.1–31)
ARTERIAL PATENCY WRIST A: ABNORMAL
ARTERIAL PATENCY WRIST A: ABNORMAL
ARTERIAL PATENCY WRIST A: NO
ARTERIAL PATENCY WRIST A: YES
AST SERPL-CCNC: 107 U/L (ref 15–37)
AST SERPL-CCNC: 30 U/L (ref 15–37)
AST SERPL-CCNC: 38 U/L (ref 15–37)
AST SERPL-CCNC: 39 U/L (ref 15–37)
AST SERPL-CCNC: 46 U/L (ref 15–37)
ATRIAL RATE: 157 BPM
ATRIAL RATE: 34 BPM
ATRIAL RATE: 54 BPM
ATRIAL RATE: 70 BPM
ATRIAL RATE: 98 BPM
BACTERIA SPEC CULT: ABNORMAL
BACTERIA SPEC CULT: ABNORMAL
BACTERIA SPEC CULT: NORMAL
BACTERIA URNS QL MICRO: NEGATIVE /HPF
BASE DEFICIT BLDA-SCNC: 0.8 MMOL/L
BASE DEFICIT BLDA-SCNC: 10.5 MMOL/L
BASE DEFICIT BLDA-SCNC: 4 MMOL/L
BASE DEFICIT BLDA-SCNC: 4.2 MMOL/L
BASE DEFICIT BLDA-SCNC: 4.8 MMOL/L
BASE DEFICIT BLDA-SCNC: 5.3 MMOL/L
BASE EXCESS BLDA CALC-SCNC: 1.9 MMOL/L
BASE EXCESS BLDA CALC-SCNC: 3.4 MMOL/L
BASOPHILS # BLD: 0 K/UL (ref 0–0.1)
BASOPHILS # BLD: 0.1 K/UL (ref 0–0.1)
BASOPHILS NFR BLD: 0 % (ref 0–1)
BASOPHILS NFR BLD: 1 % (ref 0–1)
BDY SITE: ABNORMAL
BILIRUB DIRECT SERPL-MCNC: 0.2 MG/DL (ref 0–0.2)
BILIRUB SERPL-MCNC: 0.2 MG/DL (ref 0.2–1)
BILIRUB SERPL-MCNC: 0.4 MG/DL (ref 0.2–1)
BILIRUB SERPL-MCNC: 0.5 MG/DL (ref 0.2–1)
BILIRUB UR QL CFM: NEGATIVE
BNP SERPL-MCNC: 8779 PG/ML
BUN SERPL-MCNC: 38 MG/DL (ref 6–20)
BUN SERPL-MCNC: 38 MG/DL (ref 6–20)
BUN SERPL-MCNC: 45 MG/DL (ref 6–20)
BUN SERPL-MCNC: 45 MG/DL (ref 6–20)
BUN SERPL-MCNC: 46 MG/DL (ref 6–20)
BUN SERPL-MCNC: 48 MG/DL (ref 6–20)
BUN SERPL-MCNC: 55 MG/DL (ref 6–20)
BUN SERPL-MCNC: 57 MG/DL (ref 6–20)
BUN SERPL-MCNC: 58 MG/DL (ref 6–20)
BUN/CREAT SERPL: 21 (ref 12–20)
BUN/CREAT SERPL: 22 (ref 12–20)
BUN/CREAT SERPL: 25 (ref 12–20)
BUN/CREAT SERPL: 26 (ref 12–20)
BUN/CREAT SERPL: 27 (ref 12–20)
BUN/CREAT SERPL: 33 (ref 12–20)
BUN/CREAT SERPL: 40 (ref 12–20)
BUN/CREAT SERPL: 41 (ref 12–20)
BUN/CREAT SERPL: 41 (ref 12–20)
CALCIUM SERPL-MCNC: 8.1 MG/DL (ref 8.5–10.1)
CALCIUM SERPL-MCNC: 8.2 MG/DL (ref 8.5–10.1)
CALCIUM SERPL-MCNC: 8.2 MG/DL (ref 8.5–10.1)
CALCIUM SERPL-MCNC: 8.4 MG/DL (ref 8.5–10.1)
CALCIUM SERPL-MCNC: 8.4 MG/DL (ref 8.5–10.1)
CALCIUM SERPL-MCNC: 8.5 MG/DL (ref 8.5–10.1)
CALCIUM SERPL-MCNC: 8.7 MG/DL (ref 8.5–10.1)
CALCIUM SERPL-MCNC: 9 MG/DL (ref 8.5–10.1)
CALCIUM SERPL-MCNC: 9.1 MG/DL (ref 8.5–10.1)
CALCULATED P AXIS, ECG09: 0 DEGREES
CALCULATED P AXIS, ECG09: 1 DEGREES
CALCULATED R AXIS, ECG10: -20 DEGREES
CALCULATED R AXIS, ECG10: -23 DEGREES
CALCULATED R AXIS, ECG10: -27 DEGREES
CALCULATED R AXIS, ECG10: -42 DEGREES
CALCULATED R AXIS, ECG10: 11 DEGREES
CALCULATED T AXIS, ECG11: -64 DEGREES
CALCULATED T AXIS, ECG11: 152 DEGREES
CALCULATED T AXIS, ECG11: 68 DEGREES
CALCULATED T AXIS, ECG11: 68 DEGREES
CALCULATED T AXIS, ECG11: 72 DEGREES
CHLORIDE SERPL-SCNC: 105 MMOL/L (ref 97–108)
CHLORIDE SERPL-SCNC: 106 MMOL/L (ref 97–108)
CHLORIDE SERPL-SCNC: 107 MMOL/L (ref 97–108)
CHLORIDE SERPL-SCNC: 108 MMOL/L (ref 97–108)
CHLORIDE SERPL-SCNC: 108 MMOL/L (ref 97–108)
CK SERPL-CCNC: 38 U/L (ref 39–308)
CO2 SERPL-SCNC: 21 MMOL/L (ref 21–32)
CO2 SERPL-SCNC: 22 MMOL/L (ref 21–32)
CO2 SERPL-SCNC: 23 MMOL/L (ref 21–32)
CO2 SERPL-SCNC: 26 MMOL/L (ref 21–32)
COLOR UR: ABNORMAL
COMMENT, HOLDF: NORMAL
COMMENT, HOLDF: NORMAL
CREAT SERPL-MCNC: 1.34 MG/DL (ref 0.7–1.3)
CREAT SERPL-MCNC: 1.38 MG/DL (ref 0.7–1.3)
CREAT SERPL-MCNC: 1.4 MG/DL (ref 0.7–1.3)
CREAT SERPL-MCNC: 1.4 MG/DL (ref 0.7–1.3)
CREAT SERPL-MCNC: 1.44 MG/DL (ref 0.7–1.3)
CREAT SERPL-MCNC: 1.5 MG/DL (ref 0.7–1.3)
CREAT SERPL-MCNC: 1.72 MG/DL (ref 0.7–1.3)
CREAT SERPL-MCNC: 2.16 MG/DL (ref 0.7–1.3)
CREAT SERPL-MCNC: 2.17 MG/DL (ref 0.7–1.3)
DIAGNOSIS, 93000: NORMAL
DIFFERENTIAL METHOD BLD: ABNORMAL
ECHO AV AREA PEAK VELOCITY: 0.83 CM2
ECHO AV AREA/BSA PEAK VELOCITY: 0.4 CM2/M2
ECHO AV MEAN GRADIENT: 22.33 MMHG
ECHO AV MEAN GRADIENT: 40.76 MMHG
ECHO AV PEAK GRADIENT: 44.89 MMHG
ECHO AV PEAK GRADIENT: 64.42 MMHG
ECHO AV PEAK VELOCITY: 335.02 CM/S
ECHO AV PEAK VELOCITY: 401.31 CM/S
ECHO AV VTI: 58.33 CM
ECHO AV VTI: 79.99 CM
ECHO IVC PROX: 1.84 CM
ECHO LA AREA 4C: 14.69 CM2
ECHO LA VOL 2C: 40.11 ML (ref 18–58)
ECHO LA VOL 4C: 32.73 ML (ref 18–58)
ECHO LA VOL BP: 40.99 ML (ref 18–58)
ECHO LA VOL/BSA BIPLANE: 20.27 ML/M2 (ref 16–28)
ECHO LA VOLUME INDEX A2C: 19.84 ML/M2 (ref 16–28)
ECHO LA VOLUME INDEX A4C: 16.19 ML/M2 (ref 16–28)
ECHO LVOT DIAM: 1.86 CM
ECHO LVOT PEAK GRADIENT: 6.09 MMHG
ECHO LVOT PEAK VELOCITY: 123.38 CM/S
ECHO MV A VELOCITY: 113.56 CENTIMETER/SECOND
ECHO MV AREA PHT: 3.3 CM2
ECHO MV E DECELERATION TIME (DT): 229.69 MS
ECHO MV E VELOCITY: 97.62 CENTIMETER/SECOND
ECHO MV PRESSURE HALF TIME (PHT): 66.61 MS
ECHO RV INTERNAL DIMENSION: 3.32 CM
ECHO RV TAPSE: 1.78 CM (ref 1.5–2)
ECHO TV REGURGITANT MAX VELOCITY: 288.32 CM/S
ECHO TV REGURGITANT PEAK GRADIENT: 33.25 MMHG
EOSINOPHIL # BLD: 0 K/UL (ref 0–0.4)
EOSINOPHIL # BLD: 0.1 K/UL (ref 0–0.4)
EOSINOPHIL # BLD: 0.2 K/UL (ref 0–0.4)
EOSINOPHIL NFR BLD: 0 % (ref 0–7)
EOSINOPHIL NFR BLD: 1 % (ref 0–7)
EOSINOPHIL NFR BLD: 2 % (ref 0–7)
EPITH CASTS URNS QL MICRO: ABNORMAL /LPF
ERYTHROCYTE [DISTWIDTH] IN BLOOD BY AUTOMATED COUNT: 13.7 % (ref 11.5–14.5)
ERYTHROCYTE [DISTWIDTH] IN BLOOD BY AUTOMATED COUNT: 13.7 % (ref 11.5–14.5)
ERYTHROCYTE [DISTWIDTH] IN BLOOD BY AUTOMATED COUNT: 13.9 % (ref 11.5–14.5)
ERYTHROCYTE [DISTWIDTH] IN BLOOD BY AUTOMATED COUNT: 14 % (ref 11.5–14.5)
ERYTHROCYTE [DISTWIDTH] IN BLOOD BY AUTOMATED COUNT: 14.1 % (ref 11.5–14.5)
FERRITIN SERPL-MCNC: 388 NG/ML (ref 26–388)
FIO2 ON VENT: 100 %
FIO2 ON VENT: 40 %
FIO2 ON VENT: 50 %
FIO2 ON VENT: 70 %
FIO2 ON VENT: 80 %
FIO2 ON VENT: ABNORMAL %
FOLATE SERPL-MCNC: 26.1 NG/ML (ref 5–21)
GAS FLOW.O2 SETTING OXYMISER: 14 L/MIN
GAS FLOW.O2 SETTING OXYMISER: 18 L/MIN
GAS FLOW.O2 SETTING OXYMISER: 20 L/MIN
GLOBULIN SER CALC-MCNC: 3.6 G/DL (ref 2–4)
GLOBULIN SER CALC-MCNC: 3.8 G/DL (ref 2–4)
GLOBULIN SER CALC-MCNC: 3.8 G/DL (ref 2–4)
GLOBULIN SER CALC-MCNC: 4.1 G/DL (ref 2–4)
GLOBULIN SER CALC-MCNC: 4.5 G/DL (ref 2–4)
GLUCOSE SERPL-MCNC: 109 MG/DL (ref 65–100)
GLUCOSE SERPL-MCNC: 114 MG/DL (ref 65–100)
GLUCOSE SERPL-MCNC: 117 MG/DL (ref 65–100)
GLUCOSE SERPL-MCNC: 123 MG/DL (ref 65–100)
GLUCOSE SERPL-MCNC: 123 MG/DL (ref 65–100)
GLUCOSE SERPL-MCNC: 129 MG/DL (ref 65–100)
GLUCOSE SERPL-MCNC: 132 MG/DL (ref 65–100)
GLUCOSE SERPL-MCNC: 134 MG/DL (ref 65–100)
GLUCOSE SERPL-MCNC: 149 MG/DL (ref 65–100)
GLUCOSE UR STRIP.AUTO-MCNC: NEGATIVE MG/DL
HAPTOGLOB SERPL-MCNC: 478 MG/DL (ref 30–200)
HCO3 BLDA-SCNC: 17 MMOL/L (ref 22–26)
HCO3 BLDA-SCNC: 19 MMOL/L (ref 22–26)
HCO3 BLDA-SCNC: 19 MMOL/L (ref 22–26)
HCO3 BLDA-SCNC: 20 MMOL/L (ref 22–26)
HCO3 BLDA-SCNC: 21 MMOL/L (ref 22–26)
HCO3 BLDA-SCNC: 24 MMOL/L (ref 22–26)
HCO3 BLDA-SCNC: 26 MMOL/L (ref 22–26)
HCO3 BLDA-SCNC: 27 MMOL/L (ref 22–26)
HCT VFR BLD AUTO: 27.9 % (ref 36.6–50.3)
HCT VFR BLD AUTO: 28.2 % (ref 36.6–50.3)
HCT VFR BLD AUTO: 29.2 % (ref 36.6–50.3)
HCT VFR BLD AUTO: 29.8 % (ref 36.6–50.3)
HCT VFR BLD AUTO: 30.8 % (ref 36.6–50.3)
HCT VFR BLD AUTO: 31.3 % (ref 36.6–50.3)
HCT VFR BLD AUTO: 31.5 % (ref 36.6–50.3)
HCT VFR BLD AUTO: 33.2 % (ref 36.6–50.3)
HCT VFR BLD AUTO: 35.1 % (ref 36.6–50.3)
HGB BLD-MCNC: 10 G/DL (ref 12.1–17)
HGB BLD-MCNC: 10.4 G/DL (ref 12.1–17)
HGB BLD-MCNC: 11.3 G/DL (ref 12.1–17)
HGB BLD-MCNC: 8.7 G/DL (ref 12.1–17)
HGB BLD-MCNC: 8.8 G/DL (ref 12.1–17)
HGB BLD-MCNC: 9.2 G/DL (ref 12.1–17)
HGB BLD-MCNC: 9.5 G/DL (ref 12.1–17)
HGB BLD-MCNC: 9.8 G/DL (ref 12.1–17)
HGB BLD-MCNC: 9.9 G/DL (ref 12.1–17)
HGB UR QL STRIP: ABNORMAL
IMM GRANULOCYTES # BLD AUTO: 0 K/UL (ref 0–0.04)
IMM GRANULOCYTES # BLD AUTO: 0.1 K/UL (ref 0–0.04)
IMM GRANULOCYTES NFR BLD AUTO: 0 % (ref 0–0.5)
IMM GRANULOCYTES NFR BLD AUTO: 1 % (ref 0–0.5)
INR PPP: 1 (ref 0.9–1.1)
INR PPP: 1.1 (ref 0.9–1.1)
IRON SATN MFR SERPL: 14 % (ref 20–50)
IRON SERPL-MCNC: 24 UG/DL (ref 35–150)
KETONES UR QL STRIP.AUTO: ABNORMAL MG/DL
LA VOL DISK BP: 38.16 ML (ref 18–58)
LACTATE SERPL-SCNC: 0.8 MMOL/L (ref 0.4–2)
LDH SERPL L TO P-CCNC: 219 U/L (ref 85–241)
LEUKOCYTE ESTERASE UR QL STRIP.AUTO: NEGATIVE
LYMPHOCYTES # BLD: 0.8 K/UL (ref 0.8–3.5)
LYMPHOCYTES # BLD: 0.8 K/UL (ref 0.8–3.5)
LYMPHOCYTES # BLD: 1 K/UL (ref 0.8–3.5)
LYMPHOCYTES # BLD: 1.1 K/UL (ref 0.8–3.5)
LYMPHOCYTES # BLD: 1.2 K/UL (ref 0.8–3.5)
LYMPHOCYTES # BLD: 1.2 K/UL (ref 0.8–3.5)
LYMPHOCYTES # BLD: 1.3 K/UL (ref 0.8–3.5)
LYMPHOCYTES # BLD: 1.3 K/UL (ref 0.8–3.5)
LYMPHOCYTES # BLD: 1.5 K/UL (ref 0.8–3.5)
LYMPHOCYTES NFR BLD: 13 % (ref 12–49)
LYMPHOCYTES NFR BLD: 13 % (ref 12–49)
LYMPHOCYTES NFR BLD: 14 % (ref 12–49)
LYMPHOCYTES NFR BLD: 15 % (ref 12–49)
LYMPHOCYTES NFR BLD: 15 % (ref 12–49)
LYMPHOCYTES NFR BLD: 16 % (ref 12–49)
LYMPHOCYTES NFR BLD: 19 % (ref 12–49)
MAGNESIUM SERPL-MCNC: 2.2 MG/DL (ref 1.6–2.4)
MAGNESIUM SERPL-MCNC: 2.3 MG/DL (ref 1.6–2.4)
MAGNESIUM SERPL-MCNC: 2.3 MG/DL (ref 1.6–2.4)
MAGNESIUM SERPL-MCNC: 2.4 MG/DL (ref 1.6–2.4)
MAGNESIUM SERPL-MCNC: 2.5 MG/DL (ref 1.6–2.4)
MCH RBC QN AUTO: 29.2 PG (ref 26–34)
MCH RBC QN AUTO: 29.3 PG (ref 26–34)
MCH RBC QN AUTO: 29.4 PG (ref 26–34)
MCH RBC QN AUTO: 29.5 PG (ref 26–34)
MCH RBC QN AUTO: 29.5 PG (ref 26–34)
MCH RBC QN AUTO: 29.6 PG (ref 26–34)
MCHC RBC AUTO-ENTMCNC: 31.1 G/DL (ref 30–36.5)
MCHC RBC AUTO-ENTMCNC: 31.2 G/DL (ref 30–36.5)
MCHC RBC AUTO-ENTMCNC: 31.2 G/DL (ref 30–36.5)
MCHC RBC AUTO-ENTMCNC: 31.3 G/DL (ref 30–36.5)
MCHC RBC AUTO-ENTMCNC: 31.5 G/DL (ref 30–36.5)
MCHC RBC AUTO-ENTMCNC: 31.9 G/DL (ref 30–36.5)
MCHC RBC AUTO-ENTMCNC: 31.9 G/DL (ref 30–36.5)
MCHC RBC AUTO-ENTMCNC: 32.1 G/DL (ref 30–36.5)
MCHC RBC AUTO-ENTMCNC: 32.2 G/DL (ref 30–36.5)
MCV RBC AUTO: 91.6 FL (ref 80–99)
MCV RBC AUTO: 91.8 FL (ref 80–99)
MCV RBC AUTO: 91.9 FL (ref 80–99)
MCV RBC AUTO: 92.3 FL (ref 80–99)
MCV RBC AUTO: 92.7 FL (ref 80–99)
MCV RBC AUTO: 93.6 FL (ref 80–99)
MCV RBC AUTO: 93.7 FL (ref 80–99)
MCV RBC AUTO: 93.8 FL (ref 80–99)
MCV RBC AUTO: 94.1 FL (ref 80–99)
MONOCYTES # BLD: 0.6 K/UL (ref 0–1)
MONOCYTES # BLD: 0.7 K/UL (ref 0–1)
MONOCYTES # BLD: 0.7 K/UL (ref 0–1)
MONOCYTES # BLD: 0.8 K/UL (ref 0–1)
MONOCYTES # BLD: 0.9 K/UL (ref 0–1)
MONOCYTES # BLD: 1 K/UL (ref 0–1)
MONOCYTES # BLD: 1 K/UL (ref 0–1)
MONOCYTES NFR BLD: 10 % (ref 5–13)
MONOCYTES NFR BLD: 11 % (ref 5–13)
MONOCYTES NFR BLD: 11 % (ref 5–13)
MONOCYTES NFR BLD: 12 % (ref 5–13)
MONOCYTES NFR BLD: 13 % (ref 5–13)
MONOCYTES NFR BLD: 9 % (ref 5–13)
MONOCYTES NFR BLD: 9 % (ref 5–13)
NEUTS SEG # BLD: 4.6 K/UL (ref 1.8–8)
NEUTS SEG # BLD: 5 K/UL (ref 1.8–8)
NEUTS SEG # BLD: 5.4 K/UL (ref 1.8–8)
NEUTS SEG # BLD: 5.5 K/UL (ref 1.8–8)
NEUTS SEG # BLD: 5.8 K/UL (ref 1.8–8)
NEUTS SEG # BLD: 6 K/UL (ref 1.8–8)
NEUTS SEG # BLD: 6.4 K/UL (ref 1.8–8)
NEUTS SEG NFR BLD: 69 % (ref 32–75)
NEUTS SEG NFR BLD: 69 % (ref 32–75)
NEUTS SEG NFR BLD: 71 % (ref 32–75)
NEUTS SEG NFR BLD: 71 % (ref 32–75)
NEUTS SEG NFR BLD: 72 % (ref 32–75)
NEUTS SEG NFR BLD: 73 % (ref 32–75)
NEUTS SEG NFR BLD: 74 % (ref 32–75)
NITRITE UR QL STRIP.AUTO: NEGATIVE
NRBC # BLD: 0 K/UL (ref 0–0.01)
NRBC BLD-RTO: 0 PER 100 WBC
P-R INTERVAL, ECG05: 132 MS
P-R INTERVAL, ECG05: 148 MS
PCO2 BLDA: 30 MMHG (ref 35–45)
PCO2 BLDA: 31 MMHG (ref 35–45)
PCO2 BLDA: 36 MMHG (ref 35–45)
PCO2 BLDA: 39 MMHG (ref 35–45)
PCO2 BLDA: 40 MMHG (ref 35–45)
PCO2 BLDA: 41 MMHG (ref 35–45)
PCO2 BLDA: 42 MMHG (ref 35–45)
PCO2 BLDA: 46 MMHG (ref 35–45)
PEEP RESPIRATORY: 6 CM[H2O]
PEEP RESPIRATORY: 8 CM[H2O]
PH BLDA: 7.2 [PH] (ref 7.35–7.45)
PH BLDA: 7.32 [PH] (ref 7.35–7.45)
PH BLDA: 7.33 [PH] (ref 7.35–7.45)
PH BLDA: 7.4 [PH] (ref 7.35–7.45)
PH BLDA: 7.41 [PH] (ref 7.35–7.45)
PH BLDA: 7.42 [PH] (ref 7.35–7.45)
PH BLDA: 7.43 [PH] (ref 7.35–7.45)
PH BLDA: 7.49 [PH] (ref 7.35–7.45)
PH UR STRIP: 5 [PH] (ref 5–8)
PHOSPHATE SERPL-MCNC: 3.2 MG/DL (ref 2.6–4.7)
PLATELET # BLD AUTO: 169 K/UL (ref 150–400)
PLATELET # BLD AUTO: 191 K/UL (ref 150–400)
PLATELET # BLD AUTO: 193 K/UL (ref 150–400)
PLATELET # BLD AUTO: 197 K/UL (ref 150–400)
PLATELET # BLD AUTO: 225 K/UL (ref 150–400)
PLATELET # BLD AUTO: 240 K/UL (ref 150–400)
PLATELET # BLD AUTO: 271 K/UL (ref 150–400)
PLATELET # BLD AUTO: 305 K/UL (ref 150–400)
PLATELET # BLD AUTO: 363 K/UL (ref 150–400)
PMV BLD AUTO: 10.2 FL (ref 8.9–12.9)
PMV BLD AUTO: 10.2 FL (ref 8.9–12.9)
PMV BLD AUTO: 10.6 FL (ref 8.9–12.9)
PMV BLD AUTO: 10.7 FL (ref 8.9–12.9)
PMV BLD AUTO: 10.7 FL (ref 8.9–12.9)
PMV BLD AUTO: 10.9 FL (ref 8.9–12.9)
PMV BLD AUTO: 10.9 FL (ref 8.9–12.9)
PMV BLD AUTO: 11 FL (ref 8.9–12.9)
PMV BLD AUTO: 9.8 FL (ref 8.9–12.9)
PO2 BLDA: 120 MMHG (ref 80–100)
PO2 BLDA: 149 MMHG (ref 80–100)
PO2 BLDA: 194 MMHG (ref 80–100)
PO2 BLDA: 68 MMHG (ref 80–100)
PO2 BLDA: 71 MMHG (ref 80–100)
PO2 BLDA: 86 MMHG (ref 80–100)
PO2 BLDA: 88 MMHG (ref 80–100)
PO2 BLDA: 94 MMHG (ref 80–100)
POTASSIUM SERPL-SCNC: 3.8 MMOL/L (ref 3.5–5.1)
POTASSIUM SERPL-SCNC: 3.9 MMOL/L (ref 3.5–5.1)
POTASSIUM SERPL-SCNC: 4.2 MMOL/L (ref 3.5–5.1)
POTASSIUM SERPL-SCNC: 4.3 MMOL/L (ref 3.5–5.1)
POTASSIUM SERPL-SCNC: 4.5 MMOL/L (ref 3.5–5.1)
POTASSIUM SERPL-SCNC: 4.6 MMOL/L (ref 3.5–5.1)
POTASSIUM SERPL-SCNC: 4.7 MMOL/L (ref 3.5–5.1)
PROT SERPL-MCNC: 6.3 G/DL (ref 6.4–8.2)
PROT SERPL-MCNC: 6.3 G/DL (ref 6.4–8.2)
PROT SERPL-MCNC: 6.6 G/DL (ref 6.4–8.2)
PROT SERPL-MCNC: 6.6 G/DL (ref 6.4–8.2)
PROT SERPL-MCNC: 7.3 G/DL (ref 6.4–8.2)
PROT UR STRIP-MCNC: 30 MG/DL
PROTHROMBIN TIME: 10.8 SEC (ref 9–11.1)
PROTHROMBIN TIME: 11.1 SEC (ref 9–11.1)
Q-T INTERVAL, ECG07: 314 MS
Q-T INTERVAL, ECG07: 372 MS
Q-T INTERVAL, ECG07: 378 MS
Q-T INTERVAL, ECG07: 464 MS
Q-T INTERVAL, ECG07: 486 MS
QRS DURATION, ECG06: 128 MS
QRS DURATION, ECG06: 138 MS
QRS DURATION, ECG06: 142 MS
QRS DURATION, ECG06: 150 MS
QRS DURATION, ECG06: 160 MS
QTC CALCULATION (BEZET), ECG08: 365 MS
QTC CALCULATION (BEZET), ECG08: 367 MS
QTC CALCULATION (BEZET), ECG08: 474 MS
QTC CALCULATION (BEZET), ECG08: 486 MS
QTC CALCULATION (BEZET), ECG08: 507 MS
RBC # BLD AUTO: 2.98 M/UL (ref 4.1–5.7)
RBC # BLD AUTO: 3.01 M/UL (ref 4.1–5.7)
RBC # BLD AUTO: 3.15 M/UL (ref 4.1–5.7)
RBC # BLD AUTO: 3.23 M/UL (ref 4.1–5.7)
RBC # BLD AUTO: 3.35 M/UL (ref 4.1–5.7)
RBC # BLD AUTO: 3.36 M/UL (ref 4.1–5.7)
RBC # BLD AUTO: 3.41 M/UL (ref 4.1–5.7)
RBC # BLD AUTO: 3.53 M/UL (ref 4.1–5.7)
RBC # BLD AUTO: 3.83 M/UL (ref 4.1–5.7)
RBC #/AREA URNS HPF: ABNORMAL /HPF (ref 0–5)
RBC MORPH BLD: ABNORMAL
RETICS # AUTO: 0.07 M/UL (ref 0.03–0.1)
RETICS/RBC NFR AUTO: 2.1 % (ref 0.7–2.1)
SAMPLES BEING HELD,HOLD: NORMAL
SAMPLES BEING HELD,HOLD: NORMAL
SAO2 % BLD: 94 % (ref 92–97)
SAO2 % BLD: 95 % (ref 92–97)
SAO2 % BLD: 96 % (ref 92–97)
SAO2 % BLD: 97 % (ref 92–97)
SAO2 % BLD: 97 % (ref 92–97)
SAO2 % BLD: 98 % (ref 92–97)
SAO2 % BLD: 98 % (ref 92–97)
SAO2 % BLD: 99 % (ref 92–97)
SAO2% DEVICE SAO2% SENSOR NAME: ABNORMAL
SERVICE CMNT-IMP: ABNORMAL
SERVICE CMNT-IMP: NORMAL
SODIUM SERPL-SCNC: 135 MMOL/L (ref 136–145)
SODIUM SERPL-SCNC: 135 MMOL/L (ref 136–145)
SODIUM SERPL-SCNC: 136 MMOL/L (ref 136–145)
SODIUM SERPL-SCNC: 136 MMOL/L (ref 136–145)
SODIUM SERPL-SCNC: 137 MMOL/L (ref 136–145)
SODIUM SERPL-SCNC: 138 MMOL/L (ref 136–145)
SODIUM SERPL-SCNC: 138 MMOL/L (ref 136–145)
SODIUM SERPL-SCNC: 140 MMOL/L (ref 136–145)
SODIUM SERPL-SCNC: 140 MMOL/L (ref 136–145)
SP GR UR REFRACTOMETRY: 1.02 (ref 1–1.03)
SPECIMEN SITE: ABNORMAL
THERAPEUTIC RANGE,PTTT: NORMAL SECS (ref 58–77)
TIBC SERPL-MCNC: 175 UG/DL (ref 250–450)
TRIGL SERPL-MCNC: 248 MG/DL (ref ?–150)
TROPONIN I BLD-MCNC: 0.21 NG/ML (ref 0–0.08)
TROPONIN I SERPL-MCNC: 0.16 NG/ML
TROPONIN I SERPL-MCNC: 0.36 NG/ML
TROPONIN I SERPL-MCNC: 0.62 NG/ML
TSH SERPL DL<=0.05 MIU/L-ACNC: 3.01 UIU/ML (ref 0.36–3.74)
UA: UC IF INDICATED,UAUC: ABNORMAL
UROBILINOGEN UR QL STRIP.AUTO: 1 EU/DL (ref 0.2–1)
VENTILATION MODE VENT: ABNORMAL
VENTRICULAR RATE, ECG03: 157 BPM
VENTRICULAR RATE, ECG03: 34 BPM
VENTRICULAR RATE, ECG03: 57 BPM
VENTRICULAR RATE, ECG03: 66 BPM
VENTRICULAR RATE, ECG03: 98 BPM
VIT B12 SERPL-MCNC: 924 PG/ML (ref 193–986)
VT SETTING VENT: 400 ML
VT SETTING VENT: 500 ML
VT SETTING VENT: 510 ML
WBC # BLD AUTO: 6.3 K/UL (ref 4.1–11.1)
WBC # BLD AUTO: 6.6 K/UL (ref 4.1–11.1)
WBC # BLD AUTO: 7.6 K/UL (ref 4.1–11.1)
WBC # BLD AUTO: 7.9 K/UL (ref 4.1–11.1)
WBC # BLD AUTO: 7.9 K/UL (ref 4.1–11.1)
WBC # BLD AUTO: 8 K/UL (ref 4.1–11.1)
WBC # BLD AUTO: 8.1 K/UL (ref 4.1–11.1)
WBC # BLD AUTO: 8.1 K/UL (ref 4.1–11.1)
WBC # BLD AUTO: 8.9 K/UL (ref 4.1–11.1)
WBC URNS QL MICRO: ABNORMAL /HPF (ref 0–4)

## 2021-01-01 PROCEDURE — 74011000250 HC RX REV CODE- 250: Performed by: INTERNAL MEDICINE

## 2021-01-01 PROCEDURE — 65610000006 HC RM INTENSIVE CARE

## 2021-01-01 PROCEDURE — 82803 BLOOD GASES ANY COMBINATION: CPT

## 2021-01-01 PROCEDURE — 82728 ASSAY OF FERRITIN: CPT

## 2021-01-01 PROCEDURE — 74011250636 HC RX REV CODE- 250/636: Performed by: EMERGENCY MEDICINE

## 2021-01-01 PROCEDURE — 71045 X-RAY EXAM CHEST 1 VIEW: CPT

## 2021-01-01 PROCEDURE — 87040 BLOOD CULTURE FOR BACTERIA: CPT

## 2021-01-01 PROCEDURE — 93312 ECHO TRANSESOPHAGEAL: CPT | Performed by: SPECIALIST

## 2021-01-01 PROCEDURE — 83540 ASSAY OF IRON: CPT

## 2021-01-01 PROCEDURE — 74011250637 HC RX REV CODE- 250/637: Performed by: HOSPITALIST

## 2021-01-01 PROCEDURE — 74011000250 HC RX REV CODE- 250: Performed by: EMERGENCY MEDICINE

## 2021-01-01 PROCEDURE — 93005 ELECTROCARDIOGRAM TRACING: CPT

## 2021-01-01 PROCEDURE — 2709999900 HC NON-CHARGEABLE SUPPLY

## 2021-01-01 PROCEDURE — 74011000258 HC RX REV CODE- 258: Performed by: EMERGENCY MEDICINE

## 2021-01-01 PROCEDURE — 36600 WITHDRAWAL OF ARTERIAL BLOOD: CPT

## 2021-01-01 PROCEDURE — 74011250637 HC RX REV CODE- 250/637: Performed by: INTERNAL MEDICINE

## 2021-01-01 PROCEDURE — 85025 COMPLETE CBC W/AUTO DIFF WBC: CPT

## 2021-01-01 PROCEDURE — 77030029684 HC NEB SM VOL KT MONA -A

## 2021-01-01 PROCEDURE — 74011250636 HC RX REV CODE- 250/636: Performed by: INTERNAL MEDICINE

## 2021-01-01 PROCEDURE — 36415 COLL VENOUS BLD VENIPUNCTURE: CPT

## 2021-01-01 PROCEDURE — 85610 PROTHROMBIN TIME: CPT

## 2021-01-01 PROCEDURE — C9113 INJ PANTOPRAZOLE SODIUM, VIA: HCPCS | Performed by: INTERNAL MEDICINE

## 2021-01-01 PROCEDURE — 94003 VENT MGMT INPAT SUBQ DAY: CPT

## 2021-01-01 PROCEDURE — 74011250636 HC RX REV CODE- 250/636: Performed by: HOSPITALIST

## 2021-01-01 PROCEDURE — 94640 AIRWAY INHALATION TREATMENT: CPT

## 2021-01-01 PROCEDURE — C1894 INTRO/SHEATH, NON-LASER: HCPCS | Performed by: INTERNAL MEDICINE

## 2021-01-01 PROCEDURE — 84478 ASSAY OF TRIGLYCERIDES: CPT

## 2021-01-01 PROCEDURE — 74011000258 HC RX REV CODE- 258: Performed by: INTERNAL MEDICINE

## 2021-01-01 PROCEDURE — 77030018729 HC ELECTRD DEFIB PAD CARD -B

## 2021-01-01 PROCEDURE — 77030004532 HC CATH ANGI DX IMP BSC -A: Performed by: INTERNAL MEDICINE

## 2021-01-01 PROCEDURE — C1769 GUIDE WIRE: HCPCS | Performed by: INTERNAL MEDICINE

## 2021-01-01 PROCEDURE — 99233 SBSQ HOSP IP/OBS HIGH 50: CPT | Performed by: STUDENT IN AN ORGANIZED HEALTH CARE EDUCATION/TRAINING PROGRAM

## 2021-01-01 PROCEDURE — 82550 ASSAY OF CK (CPK): CPT

## 2021-01-01 PROCEDURE — 80048 BASIC METABOLIC PNL TOTAL CA: CPT

## 2021-01-01 PROCEDURE — 93320 DOPPLER ECHO COMPLETE: CPT | Performed by: SPECIALIST

## 2021-01-01 PROCEDURE — 80053 COMPREHEN METABOLIC PANEL: CPT

## 2021-01-01 PROCEDURE — 74011000636 HC RX REV CODE- 636: Performed by: RADIOLOGY

## 2021-01-01 PROCEDURE — B2151ZZ FLUOROSCOPY OF LEFT HEART USING LOW OSMOLAR CONTRAST: ICD-10-PCS | Performed by: INTERNAL MEDICINE

## 2021-01-01 PROCEDURE — 77030005319 HC CATH PACE FEM BL STJU -C: Performed by: INTERNAL MEDICINE

## 2021-01-01 PROCEDURE — 4A023N7 MEASUREMENT OF CARDIAC SAMPLING AND PRESSURE, LEFT HEART, PERCUTANEOUS APPROACH: ICD-10-PCS | Performed by: INTERNAL MEDICINE

## 2021-01-01 PROCEDURE — 71250 CT THORAX DX C-: CPT

## 2021-01-01 PROCEDURE — 84484 ASSAY OF TROPONIN QUANT: CPT

## 2021-01-01 PROCEDURE — 83735 ASSAY OF MAGNESIUM: CPT

## 2021-01-01 PROCEDURE — 33210 INSERT ELECTRD/PM CATH SNGL: CPT | Performed by: INTERNAL MEDICINE

## 2021-01-01 PROCEDURE — 84443 ASSAY THYROID STIM HORMONE: CPT

## 2021-01-01 PROCEDURE — 99285 EMERGENCY DEPT VISIT HI MDM: CPT

## 2021-01-01 PROCEDURE — 99152 MOD SED SAME PHYS/QHP 5/>YRS: CPT | Performed by: INTERNAL MEDICINE

## 2021-01-01 PROCEDURE — 77030005513 HC CATH URETH FOL11 MDII -B

## 2021-01-01 PROCEDURE — 99153 MOD SED SAME PHYS/QHP EA: CPT | Performed by: INTERNAL MEDICINE

## 2021-01-01 PROCEDURE — 74018 RADEX ABDOMEN 1 VIEW: CPT

## 2021-01-01 PROCEDURE — B24BZZ4 ULTRASONOGRAPHY OF HEART WITH AORTA, TRANSESOPHAGEAL: ICD-10-PCS | Performed by: INTERNAL MEDICINE

## 2021-01-01 PROCEDURE — 77030011229 HC DIL VESL COON COOK -A: Performed by: INTERNAL MEDICINE

## 2021-01-01 PROCEDURE — 96374 THER/PROPH/DIAG INJ IV PUSH: CPT

## 2021-01-01 PROCEDURE — 99291 CRITICAL CARE FIRST HOUR: CPT | Performed by: INTERNAL MEDICINE

## 2021-01-01 PROCEDURE — 82607 VITAMIN B-12: CPT

## 2021-01-01 PROCEDURE — 31500 INSERT EMERGENCY AIRWAY: CPT

## 2021-01-01 PROCEDURE — 99233 SBSQ HOSP IP/OBS HIGH 50: CPT | Performed by: INTERNAL MEDICINE

## 2021-01-01 PROCEDURE — 99223 1ST HOSP IP/OBS HIGH 75: CPT | Performed by: INTERNAL MEDICINE

## 2021-01-01 PROCEDURE — 0BH17EZ INSERTION OF ENDOTRACHEAL AIRWAY INTO TRACHEA, VIA NATURAL OR ARTIFICIAL OPENING: ICD-10-PCS | Performed by: EMERGENCY MEDICINE

## 2021-01-01 PROCEDURE — 87186 SC STD MICRODIL/AGAR DIL: CPT

## 2021-01-01 PROCEDURE — 82746 ASSAY OF FOLIC ACID SERUM: CPT

## 2021-01-01 PROCEDURE — C1751 CATH, INF, PER/CENT/MIDLINE: HCPCS

## 2021-01-01 PROCEDURE — 80076 HEPATIC FUNCTION PANEL: CPT

## 2021-01-01 PROCEDURE — 83880 ASSAY OF NATRIURETIC PEPTIDE: CPT

## 2021-01-01 PROCEDURE — 74011000636 HC RX REV CODE- 636

## 2021-01-01 PROCEDURE — 74011250636 HC RX REV CODE- 250/636

## 2021-01-01 PROCEDURE — 85730 THROMBOPLASTIN TIME PARTIAL: CPT

## 2021-01-01 PROCEDURE — 99232 SBSQ HOSP IP/OBS MODERATE 35: CPT | Performed by: INTERNAL MEDICINE

## 2021-01-01 PROCEDURE — 93312 ECHO TRANSESOPHAGEAL: CPT

## 2021-01-01 PROCEDURE — 77030018831 HC SOL IRR H20 BAXT -A

## 2021-01-01 PROCEDURE — 81001 URINALYSIS AUTO W/SCOPE: CPT

## 2021-01-01 PROCEDURE — 93458 L HRT ARTERY/VENTRICLE ANGIO: CPT | Performed by: INTERNAL MEDICINE

## 2021-01-01 PROCEDURE — 94762 N-INVAS EAR/PLS OXIMTRY CONT: CPT

## 2021-01-01 PROCEDURE — 77030041244 HC CBL PACE EXT TEMP REMG -B: Performed by: INTERNAL MEDICINE

## 2021-01-01 PROCEDURE — B2111ZZ FLUOROSCOPY OF MULTIPLE CORONARY ARTERIES USING LOW OSMOLAR CONTRAST: ICD-10-PCS | Performed by: INTERNAL MEDICINE

## 2021-01-01 PROCEDURE — 5A1223Z PERFORMANCE OF CARDIAC PACING, CONTINUOUS: ICD-10-PCS | Performed by: INTERNAL MEDICINE

## 2021-01-01 PROCEDURE — 99356 PR PROLONGED SVC I/P OR OBS SETTING 1ST HOUR: CPT | Performed by: INTERNAL MEDICINE

## 2021-01-01 PROCEDURE — 74011250636 HC RX REV CODE- 250/636: Performed by: STUDENT IN AN ORGANIZED HEALTH CARE EDUCATION/TRAINING PROGRAM

## 2021-01-01 PROCEDURE — 77030029065 HC DRSG HEMO QCLOT ZMED -B: Performed by: INTERNAL MEDICINE

## 2021-01-01 PROCEDURE — 77030018798 HC PMP KT ENTRL FED COVD -A

## 2021-01-01 PROCEDURE — 87077 CULTURE AEROBIC IDENTIFY: CPT

## 2021-01-01 PROCEDURE — 77030002996 HC SUT SLK J&J -A: Performed by: INTERNAL MEDICINE

## 2021-01-01 PROCEDURE — 99232 SBSQ HOSP IP/OBS MODERATE 35: CPT | Performed by: STUDENT IN AN ORGANIZED HEALTH CARE EDUCATION/TRAINING PROGRAM

## 2021-01-01 PROCEDURE — 76700 US EXAM ABDOM COMPLETE: CPT

## 2021-01-01 PROCEDURE — 83010 ASSAY OF HAPTOGLOBIN QUANT: CPT

## 2021-01-01 PROCEDURE — 5A1955Z RESPIRATORY VENTILATION, GREATER THAN 96 CONSECUTIVE HOURS: ICD-10-PCS | Performed by: EMERGENCY MEDICINE

## 2021-01-01 PROCEDURE — 77030013038 HC MSK CPAP FISP -A

## 2021-01-01 PROCEDURE — 94664 DEMO&/EVAL PT USE INHALER: CPT

## 2021-01-01 PROCEDURE — 74176 CT ABD & PELVIS W/O CONTRAST: CPT

## 2021-01-01 PROCEDURE — 83615 LACTATE (LD) (LDH) ENZYME: CPT

## 2021-01-01 PROCEDURE — 77010033678 HC OXYGEN DAILY

## 2021-01-01 PROCEDURE — 93306 TTE W/DOPPLER COMPLETE: CPT

## 2021-01-01 PROCEDURE — 85045 AUTOMATED RETICULOCYTE COUNT: CPT

## 2021-01-01 PROCEDURE — 77030005320 HC CATH PACE TEMP STJU -B: Performed by: INTERNAL MEDICINE

## 2021-01-01 PROCEDURE — 76937 US GUIDE VASCULAR ACCESS: CPT | Performed by: INTERNAL MEDICINE

## 2021-01-01 PROCEDURE — 74011000636 HC RX REV CODE- 636: Performed by: INTERNAL MEDICINE

## 2021-01-01 PROCEDURE — B24BZZ4 ULTRASONOGRAPHY OF HEART WITH AORTA, TRANSESOPHAGEAL: ICD-10-PCS | Performed by: SPECIALIST

## 2021-01-01 PROCEDURE — 93325 DOPPLER ECHO COLOR FLOW MAPG: CPT | Performed by: SPECIALIST

## 2021-01-01 PROCEDURE — 83605 ASSAY OF LACTIC ACID: CPT

## 2021-01-01 PROCEDURE — 94002 VENT MGMT INPAT INIT DAY: CPT

## 2021-01-01 PROCEDURE — 84100 ASSAY OF PHOSPHORUS: CPT

## 2021-01-01 PROCEDURE — 71275 CT ANGIOGRAPHY CHEST: CPT

## 2021-01-01 PROCEDURE — 02HK3JZ INSERTION OF PACEMAKER LEAD INTO RIGHT VENTRICLE, PERCUTANEOUS APPROACH: ICD-10-PCS | Performed by: INTERNAL MEDICINE

## 2021-01-01 PROCEDURE — 93320 DOPPLER ECHO COMPLETE: CPT

## 2021-01-01 PROCEDURE — 70450 CT HEAD/BRAIN W/O DYE: CPT

## 2021-01-01 DEVICE — BIPOLAR PACING CATHETER
Type: IMPLANTABLE DEVICE | Status: FUNCTIONAL
Brand: PACEL™

## 2021-01-01 DEVICE — FLOW DIRECTED PACING CATHETER
Type: IMPLANTABLE DEVICE | Status: FUNCTIONAL
Brand: PACEL™

## 2021-01-01 RX ORDER — PANTOPRAZOLE SODIUM 40 MG/1
40 TABLET, DELAYED RELEASE ORAL DAILY
COMMUNITY

## 2021-01-01 RX ORDER — AMIODARONE HYDROCHLORIDE 150 MG/3ML
INJECTION, SOLUTION INTRAVENOUS
Status: COMPLETED | OUTPATIENT
Start: 2021-01-01 | End: 2021-01-01

## 2021-01-01 RX ORDER — NOREPINEPHRINE BITARTRATE/D5W 8 MG/250ML
.5-16 PLASTIC BAG, INJECTION (ML) INTRAVENOUS
Status: DISCONTINUED | OUTPATIENT
Start: 2021-01-01 | End: 2021-01-01

## 2021-01-01 RX ORDER — DOPAMINE HYDROCHLORIDE 320 MG/100ML
INJECTION, SOLUTION INTRAVENOUS
Status: COMPLETED
Start: 2021-01-01 | End: 2021-01-01

## 2021-01-01 RX ORDER — DOPAMINE HYDROCHLORIDE 320 MG/100ML
0-20 INJECTION, SOLUTION INTRAVENOUS
Status: DISCONTINUED | OUTPATIENT
Start: 2021-01-01 | End: 2021-01-01

## 2021-01-01 RX ORDER — ATROPINE SULFATE 0.1 MG/ML
INJECTION INTRAVENOUS
Status: COMPLETED | OUTPATIENT
Start: 2021-01-01 | End: 2021-01-01

## 2021-01-01 RX ORDER — POLYETHYLENE GLYCOL 3350 17 G/17G
17 POWDER, FOR SOLUTION ORAL DAILY PRN
Status: DISCONTINUED | OUTPATIENT
Start: 2021-01-01 | End: 2021-05-01 | Stop reason: HOSPADM

## 2021-01-01 RX ORDER — EPINEPHRINE 0.1 MG/ML
INJECTION INTRACARDIAC; INTRAVENOUS
Status: COMPLETED | OUTPATIENT
Start: 2021-01-01 | End: 2021-01-01

## 2021-01-01 RX ORDER — LORAZEPAM 2 MG/ML
1 INJECTION INTRAMUSCULAR
Status: DISCONTINUED | OUTPATIENT
Start: 2021-01-01 | End: 2021-05-01 | Stop reason: HOSPADM

## 2021-01-01 RX ORDER — ETOMIDATE 2 MG/ML
INJECTION INTRAVENOUS
Status: COMPLETED | OUTPATIENT
Start: 2021-01-01 | End: 2021-01-01

## 2021-01-01 RX ORDER — IPRATROPIUM BROMIDE AND ALBUTEROL SULFATE 2.5; .5 MG/3ML; MG/3ML
3 SOLUTION RESPIRATORY (INHALATION)
Status: DISCONTINUED | OUTPATIENT
Start: 2021-01-01 | End: 2021-01-01

## 2021-01-01 RX ORDER — HYDROMORPHONE HYDROCHLORIDE 1 MG/ML
INJECTION, SOLUTION INTRAMUSCULAR; INTRAVENOUS; SUBCUTANEOUS
Status: COMPLETED
Start: 2021-01-01 | End: 2021-01-01

## 2021-01-01 RX ORDER — PROMETHAZINE HYDROCHLORIDE 25 MG/1
12.5 TABLET ORAL
Status: DISCONTINUED | OUTPATIENT
Start: 2021-01-01 | End: 2021-01-01

## 2021-01-01 RX ORDER — HYDROMORPHONE HYDROCHLORIDE 1 MG/ML
0.5 INJECTION, SOLUTION INTRAMUSCULAR; INTRAVENOUS; SUBCUTANEOUS
Status: DISCONTINUED | OUTPATIENT
Start: 2021-01-01 | End: 2021-01-01

## 2021-01-01 RX ORDER — PROPOFOL 10 MG/ML
0-50 VIAL (ML) INTRAVENOUS
Status: DISCONTINUED | OUTPATIENT
Start: 2021-01-01 | End: 2021-01-01

## 2021-01-01 RX ORDER — FUROSEMIDE 10 MG/ML
40 INJECTION INTRAMUSCULAR; INTRAVENOUS 2 TIMES DAILY
Status: DISCONTINUED | OUTPATIENT
Start: 2021-01-01 | End: 2021-01-01

## 2021-01-01 RX ORDER — FUROSEMIDE 10 MG/ML
40 INJECTION INTRAMUSCULAR; INTRAVENOUS ONCE
Status: COMPLETED | OUTPATIENT
Start: 2021-01-01 | End: 2021-01-01

## 2021-01-01 RX ORDER — GLYCOPYRROLATE 0.2 MG/ML
0.2 INJECTION INTRAMUSCULAR; INTRAVENOUS
Status: DISCONTINUED | OUTPATIENT
Start: 2021-01-01 | End: 2021-05-01 | Stop reason: HOSPADM

## 2021-01-01 RX ORDER — ACETAMINOPHEN 325 MG/1
650 TABLET ORAL
Status: DISCONTINUED | OUTPATIENT
Start: 2021-01-01 | End: 2021-05-01 | Stop reason: HOSPADM

## 2021-01-01 RX ORDER — SODIUM CHLORIDE, SODIUM LACTATE, POTASSIUM CHLORIDE, CALCIUM CHLORIDE 600; 310; 30; 20 MG/100ML; MG/100ML; MG/100ML; MG/100ML
75 INJECTION, SOLUTION INTRAVENOUS CONTINUOUS
Status: DISCONTINUED | OUTPATIENT
Start: 2021-01-01 | End: 2021-01-01

## 2021-01-01 RX ORDER — FENTANYL CITRATE 50 UG/ML
100 INJECTION, SOLUTION INTRAMUSCULAR; INTRAVENOUS ONCE
Status: DISCONTINUED | OUTPATIENT
Start: 2021-01-01 | End: 2021-01-01 | Stop reason: HOSPADM

## 2021-01-01 RX ORDER — HYDROMORPHONE HYDROCHLORIDE 1 MG/ML
1 INJECTION, SOLUTION INTRAMUSCULAR; INTRAVENOUS; SUBCUTANEOUS
Status: DISCONTINUED | OUTPATIENT
Start: 2021-01-01 | End: 2021-05-01 | Stop reason: HOSPADM

## 2021-01-01 RX ORDER — TETRAHYDROZOLINE HCL 0.5 OZ
2 EYE DROP SOLUTION
COMMUNITY

## 2021-01-01 RX ORDER — LEVOTHYROXINE SODIUM 50 UG/1
50 TABLET ORAL
COMMUNITY

## 2021-01-01 RX ORDER — MIDAZOLAM HYDROCHLORIDE 1 MG/ML
INJECTION, SOLUTION INTRAMUSCULAR; INTRAVENOUS
Status: DISPENSED
Start: 2021-01-01 | End: 2021-01-01

## 2021-01-01 RX ORDER — ADENOSINE 3 MG/ML
6 INJECTION, SOLUTION INTRAVENOUS ONCE
Status: DISCONTINUED | OUTPATIENT
Start: 2021-01-01 | End: 2021-01-01 | Stop reason: ALTCHOICE

## 2021-01-01 RX ORDER — DICLOFENAC SODIUM 10 MG/G
1 GEL TOPICAL
COMMUNITY

## 2021-01-01 RX ORDER — ADENOSINE 3 MG/ML
6 INJECTION, SOLUTION INTRAVENOUS ONCE
Status: COMPLETED | OUTPATIENT
Start: 2021-01-01 | End: 2021-01-01

## 2021-01-01 RX ORDER — LORAZEPAM 2 MG/ML
INJECTION INTRAMUSCULAR
Status: COMPLETED
Start: 2021-01-01 | End: 2021-01-01

## 2021-01-01 RX ORDER — FAMOTIDINE 20 MG/1
20 TABLET, FILM COATED ORAL DAILY
Status: DISCONTINUED | OUTPATIENT
Start: 2021-01-01 | End: 2021-01-01

## 2021-01-01 RX ORDER — SODIUM CHLORIDE 0.9 % (FLUSH) 0.9 %
5-40 SYRINGE (ML) INJECTION EVERY 8 HOURS
Status: DISCONTINUED | OUTPATIENT
Start: 2021-01-01 | End: 2021-05-01 | Stop reason: HOSPADM

## 2021-01-01 RX ORDER — SODIUM CHLORIDE 0.9 % (FLUSH) 0.9 %
5-40 SYRINGE (ML) INJECTION AS NEEDED
Status: DISCONTINUED | OUTPATIENT
Start: 2021-01-01 | End: 2021-05-01 | Stop reason: HOSPADM

## 2021-01-01 RX ORDER — METRONIDAZOLE 500 MG/100ML
500 INJECTION, SOLUTION INTRAVENOUS EVERY 12 HOURS
Status: DISCONTINUED | OUTPATIENT
Start: 2021-01-01 | End: 2021-01-01

## 2021-01-01 RX ORDER — LIDOCAINE HYDROCHLORIDE 10 MG/ML
INJECTION INFILTRATION; PERINEURAL AS NEEDED
Status: DISCONTINUED | OUTPATIENT
Start: 2021-01-01 | End: 2021-01-01 | Stop reason: HOSPADM

## 2021-01-01 RX ORDER — FENTANYL CITRATE 50 UG/ML
50 INJECTION, SOLUTION INTRAMUSCULAR; INTRAVENOUS
Status: DISCONTINUED | OUTPATIENT
Start: 2021-01-01 | End: 2021-05-01 | Stop reason: HOSPADM

## 2021-01-01 RX ORDER — ACETAMINOPHEN 650 MG/1
650 SUPPOSITORY RECTAL
Status: DISCONTINUED | OUTPATIENT
Start: 2021-01-01 | End: 2021-01-01

## 2021-01-01 RX ORDER — METOPROLOL TARTRATE 25 MG/1
25 TABLET, FILM COATED ORAL 2 TIMES DAILY
COMMUNITY

## 2021-01-01 RX ORDER — MIDAZOLAM HYDROCHLORIDE 1 MG/ML
2 INJECTION, SOLUTION INTRAMUSCULAR; INTRAVENOUS AS NEEDED
Status: DISCONTINUED | OUTPATIENT
Start: 2021-01-01 | End: 2021-01-01 | Stop reason: HOSPADM

## 2021-01-01 RX ORDER — LORAZEPAM 2 MG/ML
0.5 INJECTION INTRAMUSCULAR
Status: COMPLETED | OUTPATIENT
Start: 2021-01-01 | End: 2021-01-01

## 2021-01-01 RX ORDER — LEVOTHYROXINE SODIUM 50 UG/1
50 TABLET ORAL
Status: DISCONTINUED | OUTPATIENT
Start: 2021-01-01 | End: 2021-01-01

## 2021-01-01 RX ORDER — FENTANYL CITRATE 50 UG/ML
INJECTION, SOLUTION INTRAMUSCULAR; INTRAVENOUS AS NEEDED
Status: DISCONTINUED | OUTPATIENT
Start: 2021-01-01 | End: 2021-01-01 | Stop reason: HOSPADM

## 2021-01-01 RX ORDER — MIDAZOLAM HYDROCHLORIDE 1 MG/ML
INJECTION, SOLUTION INTRAMUSCULAR; INTRAVENOUS AS NEEDED
Status: DISCONTINUED | OUTPATIENT
Start: 2021-01-01 | End: 2021-01-01 | Stop reason: HOSPADM

## 2021-01-01 RX ORDER — MIDAZOLAM HYDROCHLORIDE 1 MG/ML
2 INJECTION, SOLUTION INTRAMUSCULAR; INTRAVENOUS
Status: COMPLETED | OUTPATIENT
Start: 2021-01-01 | End: 2021-01-01

## 2021-01-01 RX ORDER — HEPARIN SODIUM 5000 [USP'U]/ML
5000 INJECTION, SOLUTION INTRAVENOUS; SUBCUTANEOUS EVERY 8 HOURS
Status: DISCONTINUED | OUTPATIENT
Start: 2021-01-01 | End: 2021-01-01

## 2021-01-01 RX ORDER — LORAZEPAM 2 MG/ML
1 INJECTION INTRAMUSCULAR
Status: COMPLETED | OUTPATIENT
Start: 2021-01-01 | End: 2021-01-01

## 2021-01-01 RX ORDER — ADHESIVE BANDAGE
30 BANDAGE TOPICAL DAILY PRN
COMMUNITY

## 2021-01-01 RX ORDER — MIDAZOLAM HYDROCHLORIDE 1 MG/ML
1 INJECTION, SOLUTION INTRAMUSCULAR; INTRAVENOUS
Status: DISCONTINUED | OUTPATIENT
Start: 2021-01-01 | End: 2021-05-01 | Stop reason: HOSPADM

## 2021-01-01 RX ORDER — SCOLOPAMINE TRANSDERMAL SYSTEM 1 MG/1
1 PATCH, EXTENDED RELEASE TRANSDERMAL
Status: DISCONTINUED | OUTPATIENT
Start: 2021-01-01 | End: 2021-01-01

## 2021-01-01 RX ORDER — HEPARIN SODIUM 200 [USP'U]/100ML
INJECTION, SOLUTION INTRAVENOUS
Status: COMPLETED | OUTPATIENT
Start: 2021-01-01 | End: 2021-01-01

## 2021-01-01 RX ORDER — ROCURONIUM BROMIDE 10 MG/ML
INJECTION, SOLUTION INTRAVENOUS
Status: COMPLETED | OUTPATIENT
Start: 2021-01-01 | End: 2021-01-01

## 2021-01-01 RX ORDER — MIDAZOLAM IN 0.9 % SOD.CHLORID 1 MG/ML
0-10 PLASTIC BAG, INJECTION (ML) INTRAVENOUS
Status: DISCONTINUED | OUTPATIENT
Start: 2021-01-01 | End: 2021-01-01

## 2021-01-01 RX ORDER — ONDANSETRON 2 MG/ML
4 INJECTION INTRAMUSCULAR; INTRAVENOUS
Status: DISCONTINUED | OUTPATIENT
Start: 2021-01-01 | End: 2021-05-01 | Stop reason: HOSPADM

## 2021-01-01 RX ADMIN — Medication 10 ML: at 13:14

## 2021-01-01 RX ADMIN — HEPARIN SODIUM 5000 UNITS: 5000 INJECTION INTRAVENOUS; SUBCUTANEOUS at 06:29

## 2021-01-01 RX ADMIN — IPRATROPIUM BROMIDE AND ALBUTEROL SULFATE 3 ML: .5; 2.5 SOLUTION RESPIRATORY (INHALATION) at 19:26

## 2021-01-01 RX ADMIN — LORAZEPAM 1 MG: 2 INJECTION INTRAMUSCULAR; INTRAVENOUS at 15:35

## 2021-01-01 RX ADMIN — MIDAZOLAM HYDROCHLORIDE 2 MG: 1 INJECTION, SOLUTION INTRAMUSCULAR; INTRAVENOUS at 19:17

## 2021-01-01 RX ADMIN — IPRATROPIUM BROMIDE AND ALBUTEROL SULFATE 3 ML: .5; 2.5 SOLUTION RESPIRATORY (INHALATION) at 19:52

## 2021-01-01 RX ADMIN — HEPARIN SODIUM 5000 UNITS: 5000 INJECTION INTRAVENOUS; SUBCUTANEOUS at 13:44

## 2021-01-01 RX ADMIN — METRONIDAZOLE 500 MG: 500 INJECTION, SOLUTION INTRAVENOUS at 00:44

## 2021-01-01 RX ADMIN — AMIODARONE HYDROCHLORIDE 1 MG/MIN: 50 INJECTION, SOLUTION INTRAVENOUS at 00:39

## 2021-01-01 RX ADMIN — HEPARIN SODIUM 5000 UNITS: 5000 INJECTION INTRAVENOUS; SUBCUTANEOUS at 13:09

## 2021-01-01 RX ADMIN — PROPOFOL 30 MCG/KG/MIN: 10 INJECTION, EMULSION INTRAVENOUS at 18:13

## 2021-01-01 RX ADMIN — HEPARIN SODIUM 5000 UNITS: 5000 INJECTION INTRAVENOUS; SUBCUTANEOUS at 21:47

## 2021-01-01 RX ADMIN — DOPAMINE HYDROCHLORIDE 5 MCG/KG/MIN: 320 INJECTION, SOLUTION INTRAVENOUS at 01:40

## 2021-01-01 RX ADMIN — SODIUM CHLORIDE 550 MG: 9 INJECTION, SOLUTION INTRAMUSCULAR; INTRAVENOUS; SUBCUTANEOUS at 15:42

## 2021-01-01 RX ADMIN — EPINEPHRINE 1 MG: 0.1 INJECTION, SOLUTION ENDOTRACHEAL; INTRACARDIAC; INTRAVENOUS at 20:41

## 2021-01-01 RX ADMIN — LEVOTHYROXINE SODIUM 50 MCG: 0.05 TABLET ORAL at 05:50

## 2021-01-01 RX ADMIN — HEPARIN SODIUM 5000 UNITS: 5000 INJECTION INTRAVENOUS; SUBCUTANEOUS at 05:32

## 2021-01-01 RX ADMIN — PROPOFOL 30 MCG/KG/MIN: 10 INJECTION, EMULSION INTRAVENOUS at 09:37

## 2021-01-01 RX ADMIN — ROCURONIUM BROMIDE 80 MG: 10 SOLUTION INTRAVENOUS at 20:57

## 2021-01-01 RX ADMIN — HEPARIN SODIUM 5000 UNITS: 5000 INJECTION INTRAVENOUS; SUBCUTANEOUS at 06:40

## 2021-01-01 RX ADMIN — HEPARIN SODIUM 5000 UNITS: 5000 INJECTION INTRAVENOUS; SUBCUTANEOUS at 05:50

## 2021-01-01 RX ADMIN — Medication 10 ML: at 14:27

## 2021-01-01 RX ADMIN — ACETAMINOPHEN 650 MG: 650 SUPPOSITORY RECTAL at 06:02

## 2021-01-01 RX ADMIN — PROPOFOL 45 MCG/KG/MIN: 10 INJECTION, EMULSION INTRAVENOUS at 12:47

## 2021-01-01 RX ADMIN — Medication 4 MCG/MIN: at 03:41

## 2021-01-01 RX ADMIN — IPRATROPIUM BROMIDE AND ALBUTEROL SULFATE 3 ML: .5; 2.5 SOLUTION RESPIRATORY (INHALATION) at 14:20

## 2021-01-01 RX ADMIN — HEPARIN SODIUM 5000 UNITS: 5000 INJECTION INTRAVENOUS; SUBCUTANEOUS at 14:23

## 2021-01-01 RX ADMIN — HEPARIN SODIUM 5000 UNITS: 5000 INJECTION INTRAVENOUS; SUBCUTANEOUS at 22:21

## 2021-01-01 RX ADMIN — LORAZEPAM 1 MG: 2 INJECTION INTRAMUSCULAR; INTRAVENOUS at 18:46

## 2021-01-01 RX ADMIN — IPRATROPIUM BROMIDE AND ALBUTEROL SULFATE 3 ML: .5; 2.5 SOLUTION RESPIRATORY (INHALATION) at 01:00

## 2021-01-01 RX ADMIN — FAMOTIDINE 20 MG: 10 INJECTION INTRAVENOUS at 08:00

## 2021-01-01 RX ADMIN — MIDAZOLAM HYDROCHLORIDE 1 MG: 1 INJECTION, SOLUTION INTRAMUSCULAR; INTRAVENOUS at 08:36

## 2021-01-01 RX ADMIN — IPRATROPIUM BROMIDE AND ALBUTEROL SULFATE 3 ML: .5; 2.5 SOLUTION RESPIRATORY (INHALATION) at 14:40

## 2021-01-01 RX ADMIN — Medication 10 ML: at 06:54

## 2021-01-01 RX ADMIN — IPRATROPIUM BROMIDE AND ALBUTEROL SULFATE 3 ML: .5; 2.5 SOLUTION RESPIRATORY (INHALATION) at 15:01

## 2021-01-01 RX ADMIN — PROPOFOL 35 MCG/KG/MIN: 10 INJECTION, EMULSION INTRAVENOUS at 04:14

## 2021-01-01 RX ADMIN — CEFTRIAXONE 2 G: 2 INJECTION, POWDER, FOR SOLUTION INTRAMUSCULAR; INTRAVENOUS at 09:06

## 2021-01-01 RX ADMIN — HEPARIN SODIUM 5000 UNITS: 5000 INJECTION INTRAVENOUS; SUBCUTANEOUS at 22:01

## 2021-01-01 RX ADMIN — HYDROMORPHONE HYDROCHLORIDE 0.5 MG: 1 INJECTION, SOLUTION INTRAMUSCULAR; INTRAVENOUS; SUBCUTANEOUS at 15:35

## 2021-01-01 RX ADMIN — FAMOTIDINE 20 MG: 10 INJECTION INTRAVENOUS at 08:53

## 2021-01-01 RX ADMIN — Medication 10 ML: at 22:01

## 2021-01-01 RX ADMIN — ACETAMINOPHEN 650 MG: 325 TABLET ORAL at 17:36

## 2021-01-01 RX ADMIN — ACETAMINOPHEN 650 MG: 325 TABLET ORAL at 20:25

## 2021-01-01 RX ADMIN — SODIUM CHLORIDE, POTASSIUM CHLORIDE, SODIUM LACTATE AND CALCIUM CHLORIDE 75 ML/HR: 600; 310; 30; 20 INJECTION, SOLUTION INTRAVENOUS at 02:40

## 2021-01-01 RX ADMIN — PROPOFOL 50 MCG/KG/MIN: 10 INJECTION, EMULSION INTRAVENOUS at 06:27

## 2021-01-01 RX ADMIN — FUROSEMIDE 40 MG: 10 INJECTION INTRAMUSCULAR; INTRAVENOUS at 08:32

## 2021-01-01 RX ADMIN — Medication 10 ML: at 21:38

## 2021-01-01 RX ADMIN — GLYCOPYRROLATE 0.2 MG: 0.2 INJECTION INTRAMUSCULAR; INTRAVENOUS at 17:40

## 2021-01-01 RX ADMIN — METRONIDAZOLE 500 MG: 500 INJECTION, SOLUTION INTRAVENOUS at 12:29

## 2021-01-01 RX ADMIN — MIDAZOLAM HYDROCHLORIDE 2 MG: 1 INJECTION, SOLUTION INTRAMUSCULAR; INTRAVENOUS at 12:45

## 2021-01-01 RX ADMIN — EPINEPHRINE 5 MCG/MIN: 1 INJECTION INTRAMUSCULAR; INTRAVENOUS; SUBCUTANEOUS at 21:24

## 2021-01-01 RX ADMIN — MIDAZOLAM HYDROCHLORIDE 2 MG: 1 INJECTION, SOLUTION INTRAMUSCULAR; INTRAVENOUS at 13:00

## 2021-01-01 RX ADMIN — SODIUM CHLORIDE, POTASSIUM CHLORIDE, SODIUM LACTATE AND CALCIUM CHLORIDE 75 ML/HR: 600; 310; 30; 20 INJECTION, SOLUTION INTRAVENOUS at 05:25

## 2021-01-01 RX ADMIN — ACETAMINOPHEN 650 MG: 325 TABLET ORAL at 18:13

## 2021-01-01 RX ADMIN — IOPAMIDOL 80 ML: 755 INJECTION, SOLUTION INTRAVENOUS at 16:01

## 2021-01-01 RX ADMIN — CEFTRIAXONE 2 G: 2 INJECTION, POWDER, FOR SOLUTION INTRAMUSCULAR; INTRAVENOUS at 09:30

## 2021-01-01 RX ADMIN — METRONIDAZOLE 500 MG: 500 INJECTION, SOLUTION INTRAVENOUS at 12:06

## 2021-01-01 RX ADMIN — IPRATROPIUM BROMIDE AND ALBUTEROL SULFATE 3 ML: .5; 2.5 SOLUTION RESPIRATORY (INHALATION) at 19:29

## 2021-01-01 RX ADMIN — ACETAMINOPHEN 650 MG: 325 TABLET ORAL at 02:11

## 2021-01-01 RX ADMIN — MIDAZOLAM HYDROCHLORIDE 1 MG: 1 INJECTION, SOLUTION INTRAMUSCULAR; INTRAVENOUS at 17:27

## 2021-01-01 RX ADMIN — ACETAMINOPHEN 650 MG: 325 TABLET ORAL at 11:45

## 2021-01-01 RX ADMIN — HEPARIN SODIUM 5000 UNITS: 5000 INJECTION INTRAVENOUS; SUBCUTANEOUS at 21:21

## 2021-01-01 RX ADMIN — LORAZEPAM 1 MG: 2 INJECTION INTRAMUSCULAR; INTRAVENOUS at 17:41

## 2021-01-01 RX ADMIN — Medication 10 ML: at 13:08

## 2021-01-01 RX ADMIN — Medication 10 ML: at 05:50

## 2021-01-01 RX ADMIN — PROPOFOL 20 MCG/KG/MIN: 10 INJECTION, EMULSION INTRAVENOUS at 07:33

## 2021-01-01 RX ADMIN — IPRATROPIUM BROMIDE AND ALBUTEROL SULFATE 3 ML: .5; 2.5 SOLUTION RESPIRATORY (INHALATION) at 08:33

## 2021-01-01 RX ADMIN — WATER 10 MG: 1 INJECTION INTRAMUSCULAR; INTRAVENOUS; SUBCUTANEOUS at 13:03

## 2021-01-01 RX ADMIN — METRONIDAZOLE 500 MG: 500 INJECTION, SOLUTION INTRAVENOUS at 13:44

## 2021-01-01 RX ADMIN — FENTANYL CITRATE 75 MCG/HR: 50 INJECTION, SOLUTION INTRAMUSCULAR; INTRAVENOUS at 20:47

## 2021-01-01 RX ADMIN — Medication 10 ML: at 06:04

## 2021-01-01 RX ADMIN — HEPARIN SODIUM 5000 UNITS: 5000 INJECTION INTRAVENOUS; SUBCUTANEOUS at 13:40

## 2021-01-01 RX ADMIN — FENTANYL CITRATE 50 MCG/HR: 50 INJECTION, SOLUTION INTRAMUSCULAR; INTRAVENOUS at 20:15

## 2021-01-01 RX ADMIN — HEPARIN SODIUM 5000 UNITS: 5000 INJECTION INTRAVENOUS; SUBCUTANEOUS at 22:24

## 2021-01-01 RX ADMIN — SODIUM CHLORIDE, POTASSIUM CHLORIDE, SODIUM LACTATE AND CALCIUM CHLORIDE 75 ML/HR: 600; 310; 30; 20 INJECTION, SOLUTION INTRAVENOUS at 11:19

## 2021-01-01 RX ADMIN — IPRATROPIUM BROMIDE AND ALBUTEROL SULFATE 3 ML: .5; 2.5 SOLUTION RESPIRATORY (INHALATION) at 01:56

## 2021-01-01 RX ADMIN — ACETAMINOPHEN 650 MG: 325 TABLET ORAL at 13:13

## 2021-01-01 RX ADMIN — AMIODARONE HYDROCHLORIDE 0.5 MG/MIN: 50 INJECTION, SOLUTION INTRAVENOUS at 07:37

## 2021-01-01 RX ADMIN — CEFTRIAXONE 1 G: 1 INJECTION, POWDER, FOR SOLUTION INTRAMUSCULAR; INTRAVENOUS at 06:39

## 2021-01-01 RX ADMIN — CEFTRIAXONE 2 G: 2 INJECTION, POWDER, FOR SOLUTION INTRAMUSCULAR; INTRAVENOUS at 08:06

## 2021-01-01 RX ADMIN — METRONIDAZOLE 500 MG: 500 INJECTION, SOLUTION INTRAVENOUS at 11:31

## 2021-01-01 RX ADMIN — SODIUM CHLORIDE 550 MG: 9 INJECTION, SOLUTION INTRAMUSCULAR; INTRAVENOUS; SUBCUTANEOUS at 16:43

## 2021-01-01 RX ADMIN — PROPOFOL 5 MCG/KG/MIN: 10 INJECTION, EMULSION INTRAVENOUS at 21:12

## 2021-01-01 RX ADMIN — IPRATROPIUM BROMIDE AND ALBUTEROL SULFATE 3 ML: .5; 2.5 SOLUTION RESPIRATORY (INHALATION) at 07:36

## 2021-01-01 RX ADMIN — IPRATROPIUM BROMIDE AND ALBUTEROL SULFATE 3 ML: .5; 2.5 SOLUTION RESPIRATORY (INHALATION) at 01:40

## 2021-01-01 RX ADMIN — HEPARIN SODIUM 5000 UNITS: 5000 INJECTION INTRAVENOUS; SUBCUTANEOUS at 21:38

## 2021-01-01 RX ADMIN — Medication 30 ML: at 21:56

## 2021-01-01 RX ADMIN — Medication 30 ML: at 05:32

## 2021-01-01 RX ADMIN — METRONIDAZOLE 500 MG: 500 INJECTION, SOLUTION INTRAVENOUS at 00:04

## 2021-01-01 RX ADMIN — ACETAMINOPHEN 650 MG: 325 TABLET ORAL at 18:23

## 2021-01-01 RX ADMIN — FENTANYL CITRATE 50 MCG: 50 INJECTION, SOLUTION INTRAMUSCULAR; INTRAVENOUS at 04:19

## 2021-01-01 RX ADMIN — LORAZEPAM 1 MG: 2 INJECTION INTRAMUSCULAR; INTRAVENOUS at 16:45

## 2021-01-01 RX ADMIN — HEPARIN SODIUM 5000 UNITS: 5000 INJECTION INTRAVENOUS; SUBCUTANEOUS at 05:43

## 2021-01-01 RX ADMIN — LORAZEPAM 1 MG: 2 INJECTION INTRAMUSCULAR at 19:13

## 2021-01-01 RX ADMIN — ATROPINE SULFATE 0.5 MG: 0.1 INJECTION, SOLUTION ENDOTRACHEAL; INTRAMUSCULAR; INTRAVENOUS; SUBCUTANEOUS at 21:07

## 2021-01-01 RX ADMIN — HYDROMORPHONE HYDROCHLORIDE 1 MG: 1 INJECTION, SOLUTION INTRAMUSCULAR; INTRAVENOUS; SUBCUTANEOUS at 16:45

## 2021-01-01 RX ADMIN — PANTOPRAZOLE SODIUM 40 MG: 40 INJECTION, POWDER, FOR SOLUTION INTRAVENOUS at 08:32

## 2021-01-01 RX ADMIN — HYDROMORPHONE HYDROCHLORIDE 1 MG: 1 INJECTION, SOLUTION INTRAMUSCULAR; INTRAVENOUS; SUBCUTANEOUS at 17:40

## 2021-01-01 RX ADMIN — METRONIDAZOLE 500 MG: 500 INJECTION, SOLUTION INTRAVENOUS at 23:54

## 2021-01-01 RX ADMIN — HEPARIN SODIUM 5000 UNITS: 5000 INJECTION INTRAVENOUS; SUBCUTANEOUS at 06:54

## 2021-01-01 RX ADMIN — MIDAZOLAM HYDROCHLORIDE 1 MG: 1 INJECTION, SOLUTION INTRAMUSCULAR; INTRAVENOUS at 12:19

## 2021-01-01 RX ADMIN — FUROSEMIDE 40 MG: 10 INJECTION INTRAMUSCULAR; INTRAVENOUS at 17:25

## 2021-01-01 RX ADMIN — PROPOFOL 40 MCG/KG/MIN: 10 INJECTION, EMULSION INTRAVENOUS at 21:54

## 2021-01-01 RX ADMIN — HEPARIN SODIUM 5000 UNITS: 5000 INJECTION INTRAVENOUS; SUBCUTANEOUS at 14:32

## 2021-01-01 RX ADMIN — IPRATROPIUM BROMIDE AND ALBUTEROL SULFATE 3 ML: .5; 2.5 SOLUTION RESPIRATORY (INHALATION) at 15:18

## 2021-01-01 RX ADMIN — FENTANYL CITRATE 50 MCG: 50 INJECTION, SOLUTION INTRAMUSCULAR; INTRAVENOUS at 00:19

## 2021-01-01 RX ADMIN — IPRATROPIUM BROMIDE AND ALBUTEROL SULFATE 3 ML: .5; 2.5 SOLUTION RESPIRATORY (INHALATION) at 17:52

## 2021-01-01 RX ADMIN — MIDAZOLAM HYDROCHLORIDE 1 MG: 1 INJECTION, SOLUTION INTRAMUSCULAR; INTRAVENOUS at 18:09

## 2021-01-01 RX ADMIN — Medication 10 ML: at 14:38

## 2021-01-01 RX ADMIN — CEFTRIAXONE 2 G: 2 INJECTION, POWDER, FOR SOLUTION INTRAMUSCULAR; INTRAVENOUS at 08:40

## 2021-01-01 RX ADMIN — MIDAZOLAM HYDROCHLORIDE 1 MG: 1 INJECTION, SOLUTION INTRAMUSCULAR; INTRAVENOUS at 04:11

## 2021-01-01 RX ADMIN — Medication 40 ML: at 06:39

## 2021-01-01 RX ADMIN — Medication 10 ML: at 13:10

## 2021-01-01 RX ADMIN — IPRATROPIUM BROMIDE AND ALBUTEROL SULFATE 3 ML: .5; 2.5 SOLUTION RESPIRATORY (INHALATION) at 07:25

## 2021-01-01 RX ADMIN — SODIUM CHLORIDE, POTASSIUM CHLORIDE, SODIUM LACTATE AND CALCIUM CHLORIDE 75 ML/HR: 600; 310; 30; 20 INJECTION, SOLUTION INTRAVENOUS at 08:54

## 2021-01-01 RX ADMIN — CEFTRIAXONE 1 G: 1 INJECTION, POWDER, FOR SOLUTION INTRAMUSCULAR; INTRAVENOUS at 11:45

## 2021-01-01 RX ADMIN — SODIUM CHLORIDE, POTASSIUM CHLORIDE, SODIUM LACTATE AND CALCIUM CHLORIDE 75 ML/HR: 600; 310; 30; 20 INJECTION, SOLUTION INTRAVENOUS at 12:43

## 2021-01-01 RX ADMIN — MIDAZOLAM HYDROCHLORIDE 1 MG: 1 INJECTION, SOLUTION INTRAMUSCULAR; INTRAVENOUS at 02:00

## 2021-01-01 RX ADMIN — PROPOFOL 40 MCG/KG/MIN: 10 INJECTION, EMULSION INTRAVENOUS at 03:41

## 2021-01-01 RX ADMIN — PROPOFOL 40 MCG/KG/MIN: 10 INJECTION, EMULSION INTRAVENOUS at 07:41

## 2021-01-01 RX ADMIN — FENTANYL CITRATE 50 MCG: 50 INJECTION, SOLUTION INTRAMUSCULAR; INTRAVENOUS at 14:24

## 2021-01-01 RX ADMIN — ACETAMINOPHEN 650 MG: 325 TABLET ORAL at 00:29

## 2021-01-01 RX ADMIN — ACETAMINOPHEN 650 MG: 650 SUPPOSITORY RECTAL at 19:51

## 2021-01-01 RX ADMIN — HYDROMORPHONE HYDROCHLORIDE 0.5 MG: 1 INJECTION, SOLUTION INTRAMUSCULAR; INTRAVENOUS; SUBCUTANEOUS at 16:00

## 2021-01-01 RX ADMIN — FENTANYL CITRATE 50 MCG: 50 INJECTION, SOLUTION INTRAMUSCULAR; INTRAVENOUS at 01:57

## 2021-01-01 RX ADMIN — FENTANYL CITRATE 50 MCG: 50 INJECTION, SOLUTION INTRAMUSCULAR; INTRAVENOUS at 00:02

## 2021-01-01 RX ADMIN — FENTANYL CITRATE 50 MCG: 50 INJECTION, SOLUTION INTRAMUSCULAR; INTRAVENOUS at 01:35

## 2021-01-01 RX ADMIN — Medication 4 MCG/MIN: at 07:58

## 2021-01-01 RX ADMIN — IOPAMIDOL 100 ML: 755 INJECTION, SOLUTION INTRAVENOUS at 16:00

## 2021-01-01 RX ADMIN — Medication 30 ML: at 01:03

## 2021-01-01 RX ADMIN — RACEPINEPHRINE HYDROCHLORIDE 0.5 ML: 11.25 SOLUTION RESPIRATORY (INHALATION) at 15:47

## 2021-01-01 RX ADMIN — FAMOTIDINE 20 MG: 10 INJECTION INTRAVENOUS at 08:08

## 2021-01-01 RX ADMIN — LORAZEPAM 1 MG: 2 INJECTION INTRAMUSCULAR; INTRAVENOUS at 19:13

## 2021-01-01 RX ADMIN — FUROSEMIDE 40 MG: 10 INJECTION INTRAMUSCULAR; INTRAVENOUS at 09:58

## 2021-01-01 RX ADMIN — PROPOFOL 40 MCG/KG/MIN: 10 INJECTION, EMULSION INTRAVENOUS at 23:39

## 2021-01-01 RX ADMIN — Medication 10 ML: at 06:31

## 2021-01-01 RX ADMIN — PANTOPRAZOLE SODIUM 40 MG: 40 INJECTION, POWDER, FOR SOLUTION INTRAVENOUS at 09:36

## 2021-01-01 RX ADMIN — Medication 10 ML: at 22:02

## 2021-01-01 RX ADMIN — HEPARIN SODIUM 5000 UNITS: 5000 INJECTION INTRAVENOUS; SUBCUTANEOUS at 14:10

## 2021-01-01 RX ADMIN — Medication 4 MCG/MIN: at 21:15

## 2021-01-01 RX ADMIN — FAMOTIDINE 20 MG: 10 INJECTION INTRAVENOUS at 08:39

## 2021-01-01 RX ADMIN — IPRATROPIUM BROMIDE AND ALBUTEROL SULFATE 3 ML: .5; 2.5 SOLUTION RESPIRATORY (INHALATION) at 07:49

## 2021-01-01 RX ADMIN — ACETAMINOPHEN 650 MG: 325 TABLET ORAL at 21:29

## 2021-01-01 RX ADMIN — LEVOTHYROXINE SODIUM 50 MCG: 0.05 TABLET ORAL at 06:39

## 2021-01-01 RX ADMIN — FENTANYL CITRATE 50 MCG: 50 INJECTION, SOLUTION INTRAMUSCULAR; INTRAVENOUS at 12:21

## 2021-01-01 RX ADMIN — IPRATROPIUM BROMIDE AND ALBUTEROL SULFATE 3 ML: .5; 2.5 SOLUTION RESPIRATORY (INHALATION) at 19:39

## 2021-01-01 RX ADMIN — FUROSEMIDE 40 MG: 10 INJECTION, SOLUTION INTRAMUSCULAR; INTRAVENOUS at 09:51

## 2021-01-01 RX ADMIN — SODIUM CHLORIDE, POTASSIUM CHLORIDE, SODIUM LACTATE AND CALCIUM CHLORIDE 75 ML/HR: 600; 310; 30; 20 INJECTION, SOLUTION INTRAVENOUS at 23:24

## 2021-01-01 RX ADMIN — HEPARIN SODIUM 5000 UNITS: 5000 INJECTION INTRAVENOUS; SUBCUTANEOUS at 05:20

## 2021-01-01 RX ADMIN — PROPOFOL 15 MCG/KG/MIN: 10 INJECTION, EMULSION INTRAVENOUS at 17:12

## 2021-01-01 RX ADMIN — NOREPINEPHRINE BITARTRATE 3 MCG/MIN: 1 INJECTION, SOLUTION, CONCENTRATE INTRAVENOUS at 08:48

## 2021-01-01 RX ADMIN — LORAZEPAM 0.5 MG: 2 INJECTION INTRAMUSCULAR at 19:02

## 2021-01-01 RX ADMIN — FUROSEMIDE 40 MG: 10 INJECTION INTRAMUSCULAR; INTRAVENOUS at 09:28

## 2021-01-01 RX ADMIN — CEFTRIAXONE 2 G: 2 INJECTION, POWDER, FOR SOLUTION INTRAMUSCULAR; INTRAVENOUS at 08:05

## 2021-01-01 RX ADMIN — ACETAMINOPHEN 650 MG: 325 TABLET ORAL at 18:37

## 2021-01-01 RX ADMIN — AMIODARONE HYDROCHLORIDE 150 MG: 50 INJECTION, SOLUTION INTRAVENOUS at 20:50

## 2021-01-01 RX ADMIN — HEPARIN SODIUM 5000 UNITS: 5000 INJECTION INTRAVENOUS; SUBCUTANEOUS at 15:25

## 2021-01-01 RX ADMIN — SODIUM CHLORIDE 500 ML: 9 INJECTION, SOLUTION INTRAVENOUS at 18:40

## 2021-01-01 RX ADMIN — FAMOTIDINE 20 MG: 10 INJECTION INTRAVENOUS at 08:06

## 2021-01-01 RX ADMIN — MIDAZOLAM HYDROCHLORIDE 1 MG: 1 INJECTION, SOLUTION INTRAMUSCULAR; INTRAVENOUS at 15:46

## 2021-01-01 RX ADMIN — PROPOFOL 50 MCG/KG/MIN: 10 INJECTION, EMULSION INTRAVENOUS at 09:17

## 2021-01-01 RX ADMIN — FENTANYL CITRATE 50 MCG: 50 INJECTION, SOLUTION INTRAMUSCULAR; INTRAVENOUS at 06:14

## 2021-01-01 RX ADMIN — Medication 10 ML: at 05:59

## 2021-01-01 RX ADMIN — MIDAZOLAM 2 MG/HR: 5 INJECTION INTRAMUSCULAR; INTRAVENOUS at 17:11

## 2021-01-01 RX ADMIN — IPRATROPIUM BROMIDE AND ALBUTEROL SULFATE 3 ML: .5; 2.5 SOLUTION RESPIRATORY (INHALATION) at 07:27

## 2021-01-01 RX ADMIN — IPRATROPIUM BROMIDE AND ALBUTEROL SULFATE 3 ML: .5; 2.5 SOLUTION RESPIRATORY (INHALATION) at 19:35

## 2021-01-01 RX ADMIN — LORAZEPAM 1 MG: 2 INJECTION INTRAMUSCULAR; INTRAVENOUS at 16:07

## 2021-01-01 RX ADMIN — ETOMIDATE 20 MG: 2 INJECTION, SOLUTION INTRAVENOUS at 20:56

## 2021-01-01 RX ADMIN — IPRATROPIUM BROMIDE AND ALBUTEROL SULFATE 3 ML: .5; 2.5 SOLUTION RESPIRATORY (INHALATION) at 07:35

## 2021-01-01 RX ADMIN — FUROSEMIDE 40 MG: 10 INJECTION INTRAMUSCULAR; INTRAVENOUS at 17:27

## 2021-01-01 RX ADMIN — HEPARIN SODIUM 5000 UNITS: 5000 INJECTION INTRAVENOUS; SUBCUTANEOUS at 21:54

## 2021-01-01 RX ADMIN — ACETAMINOPHEN 650 MG: 650 SUPPOSITORY RECTAL at 06:39

## 2021-01-01 RX ADMIN — MIDAZOLAM HYDROCHLORIDE 1 MG: 1 INJECTION, SOLUTION INTRAMUSCULAR; INTRAVENOUS at 11:40

## 2021-01-01 RX ADMIN — Medication 10 ML: at 14:33

## 2021-01-01 RX ADMIN — PROPOFOL 50 MCG/KG/MIN: 10 INJECTION, EMULSION INTRAVENOUS at 22:38

## 2021-01-01 RX ADMIN — LEVOTHYROXINE SODIUM 50 MCG: 0.05 TABLET ORAL at 17:25

## 2021-01-01 RX ADMIN — Medication 10 ML: at 21:20

## 2021-01-01 RX ADMIN — Medication 6 MCG/MIN: at 07:09

## 2021-01-01 RX ADMIN — SODIUM CHLORIDE 500 ML: 9 INJECTION, SOLUTION INTRAVENOUS at 00:56

## 2021-01-01 RX ADMIN — MIDAZOLAM HYDROCHLORIDE 1 MG: 1 INJECTION, SOLUTION INTRAMUSCULAR; INTRAVENOUS at 11:45

## 2021-01-01 RX ADMIN — PROPOFOL 40 MCG/KG/MIN: 10 INJECTION, EMULSION INTRAVENOUS at 17:01

## 2021-01-01 RX ADMIN — EPINEPHRINE 1 MG: 0.1 INJECTION, SOLUTION ENDOTRACHEAL; INTRACARDIAC; INTRAVENOUS at 20:52

## 2021-01-01 RX ADMIN — IPRATROPIUM BROMIDE AND ALBUTEROL SULFATE 3 ML: .5; 2.5 SOLUTION RESPIRATORY (INHALATION) at 23:34

## 2021-01-01 RX ADMIN — CEFTRIAXONE 2 G: 2 INJECTION, POWDER, FOR SOLUTION INTRAMUSCULAR; INTRAVENOUS at 08:32

## 2021-01-01 RX ADMIN — FENTANYL CITRATE 50 MCG: 50 INJECTION, SOLUTION INTRAMUSCULAR; INTRAVENOUS at 23:40

## 2021-01-01 RX ADMIN — IPRATROPIUM BROMIDE AND ALBUTEROL SULFATE 3 ML: .5; 2.5 SOLUTION RESPIRATORY (INHALATION) at 00:32

## 2021-01-01 RX ADMIN — MIDAZOLAM 2 MG/HR: 5 INJECTION INTRAMUSCULAR; INTRAVENOUS at 12:18

## 2021-01-01 RX ADMIN — Medication 10 ML: at 22:25

## 2021-01-01 RX ADMIN — PANTOPRAZOLE SODIUM 40 MG: 40 INJECTION, POWDER, FOR SOLUTION INTRAVENOUS at 09:06

## 2021-01-01 RX ADMIN — IOHEXOL 50 ML: 240 INJECTION, SOLUTION INTRATHECAL; INTRAVASCULAR; INTRAVENOUS; ORAL at 15:12

## 2021-01-01 RX ADMIN — IPRATROPIUM BROMIDE AND ALBUTEROL SULFATE 3 ML: .5; 2.5 SOLUTION RESPIRATORY (INHALATION) at 02:55

## 2021-01-01 RX ADMIN — IPRATROPIUM BROMIDE AND ALBUTEROL SULFATE 3 ML: .5; 2.5 SOLUTION RESPIRATORY (INHALATION) at 14:02

## 2021-01-01 RX ADMIN — IPRATROPIUM BROMIDE AND ALBUTEROL SULFATE 3 ML: .5; 2.5 SOLUTION RESPIRATORY (INHALATION) at 14:23

## 2021-01-01 RX ADMIN — IPRATROPIUM BROMIDE AND ALBUTEROL SULFATE 3 ML: .5; 2.5 SOLUTION RESPIRATORY (INHALATION) at 07:21

## 2021-01-01 RX ADMIN — MIDAZOLAM HYDROCHLORIDE 1 MG: 1 INJECTION, SOLUTION INTRAMUSCULAR; INTRAVENOUS at 22:01

## 2021-01-01 RX ADMIN — MIDAZOLAM HYDROCHLORIDE 1 MG: 1 INJECTION, SOLUTION INTRAMUSCULAR; INTRAVENOUS at 23:51

## 2021-01-01 RX ADMIN — Medication 10 ML: at 14:24

## 2021-01-01 RX ADMIN — FUROSEMIDE 40 MG: 10 INJECTION INTRAMUSCULAR; INTRAVENOUS at 09:06

## 2021-01-01 RX ADMIN — LORAZEPAM 0.5 MG: 2 INJECTION INTRAMUSCULAR; INTRAVENOUS at 19:02

## 2021-01-01 RX ADMIN — Medication 10 ML: at 23:12

## 2021-01-01 RX ADMIN — FENTANYL CITRATE 50 MCG: 50 INJECTION, SOLUTION INTRAMUSCULAR; INTRAVENOUS at 14:37

## 2021-01-01 RX ADMIN — PROPOFOL 35 MCG/KG/MIN: 10 INJECTION, EMULSION INTRAVENOUS at 13:59

## 2021-01-01 RX ADMIN — Medication 10 ML: at 13:59

## 2021-01-01 RX ADMIN — Medication 10 ML: at 06:41

## 2021-01-01 RX ADMIN — FUROSEMIDE 40 MG: 10 INJECTION INTRAMUSCULAR; INTRAVENOUS at 17:05

## 2021-01-01 RX ADMIN — SODIUM CHLORIDE, POTASSIUM CHLORIDE, SODIUM LACTATE AND CALCIUM CHLORIDE 75 ML/HR: 600; 310; 30; 20 INJECTION, SOLUTION INTRAVENOUS at 21:54

## 2021-01-01 RX ADMIN — FENTANYL CITRATE 50 MCG: 50 INJECTION, SOLUTION INTRAMUSCULAR; INTRAVENOUS at 11:36

## 2021-01-01 RX ADMIN — SODIUM CHLORIDE, POTASSIUM CHLORIDE, SODIUM LACTATE AND CALCIUM CHLORIDE 75 ML/HR: 600; 310; 30; 20 INJECTION, SOLUTION INTRAVENOUS at 16:05

## 2021-01-01 RX ADMIN — HEPARIN SODIUM 5000 UNITS: 5000 INJECTION INTRAVENOUS; SUBCUTANEOUS at 05:59

## 2021-01-01 RX ADMIN — CEFTRIAXONE 2 G: 2 INJECTION, POWDER, FOR SOLUTION INTRAMUSCULAR; INTRAVENOUS at 09:36

## 2021-01-01 RX ADMIN — Medication 10 ML: at 05:44

## 2021-01-01 RX ADMIN — LEVOTHYROXINE SODIUM 50 MCG: 0.05 TABLET ORAL at 05:20

## 2021-01-01 RX ADMIN — Medication 10 ML: at 21:47

## 2021-01-01 RX ADMIN — Medication 10 ML: at 22:22

## 2021-01-01 RX ADMIN — PROPOFOL 50 MCG/KG/MIN: 10 INJECTION, EMULSION INTRAVENOUS at 02:25

## 2021-01-01 RX ADMIN — IPRATROPIUM BROMIDE AND ALBUTEROL SULFATE 3 ML: .5; 2.5 SOLUTION RESPIRATORY (INHALATION) at 15:24

## 2021-01-01 RX ADMIN — Medication 10 ML: at 13:40

## 2021-01-01 RX ADMIN — ADENOSINE 6 MG: 3 INJECTION, SOLUTION INTRAVENOUS at 11:48

## 2021-01-01 RX ADMIN — HEPARIN SODIUM 5000 UNITS: 5000 INJECTION INTRAVENOUS; SUBCUTANEOUS at 13:08

## 2021-01-01 RX ADMIN — CEFTRIAXONE 1 G: 1 INJECTION, POWDER, FOR SOLUTION INTRAMUSCULAR; INTRAVENOUS at 08:00

## 2021-01-01 RX ADMIN — PROPOFOL 40 MCG/KG/MIN: 10 INJECTION, EMULSION INTRAVENOUS at 18:48

## 2021-01-01 RX ADMIN — FAMOTIDINE 20 MG: 10 INJECTION INTRAVENOUS at 09:30

## 2021-01-01 RX ADMIN — METRONIDAZOLE 500 MG: 500 INJECTION, SOLUTION INTRAVENOUS at 11:50

## 2021-04-21 PROBLEM — I44.2 THIRD DEGREE HEART BLOCK (HCC): Status: ACTIVE | Noted: 2021-01-01

## 2021-04-21 PROBLEM — I46.9 ASYSTOLE (HCC): Status: ACTIVE | Noted: 2021-01-01

## 2021-04-21 PROBLEM — I49.01 VENTRICULAR FIBRILLATION (HCC): Status: ACTIVE | Noted: 2021-01-01

## 2021-04-21 PROBLEM — R77.8 ELEVATED TROPONIN: Status: ACTIVE | Noted: 2021-01-01

## 2021-04-21 PROBLEM — R09.02 HYPOXIA: Status: ACTIVE | Noted: 2021-01-01

## 2021-04-21 PROBLEM — N17.9 AKI (ACUTE KIDNEY INJURY) (HCC): Status: ACTIVE | Noted: 2021-01-01

## 2021-04-21 PROBLEM — R00.1 BRADYCARDIA: Status: ACTIVE | Noted: 2021-01-01

## 2021-04-21 PROBLEM — R57.0 CARDIOGENIC SHOCK (HCC): Status: ACTIVE | Noted: 2021-01-01

## 2021-04-21 NOTE — Clinical Note
Temporary pacemaker inserted. Temporary Pacer pacing in the right ventricle. Device secured using: suture and transparent dressing. Rate = 60 bpm.   3 mA. Electrical capture obtained.  RIGHT EJ

## 2021-04-21 NOTE — Clinical Note
TRANSFER - OUT REPORT:     Verbal report given to: (at bedside). Report consisted of patient's Situation, Background, Assessment and   Recommendations(SBAR). Opportunity for questions and clarification was provided. Patient transported with a Registered Nurse, [address] [place] [address] / Patient Care Tech, Monitor, Oxygen and Respiratory Therapist.   Patient transported to: ICU, 7.

## 2021-04-21 NOTE — Clinical Note
TRANSFER - IN REPORT:     Verbal report received from: ED RN. Report consisted of patient's Situation, Background, Assessment and   Recommendations(SBAR). Opportunity for questions and clarification was provided. Assessment completed upon patient's arrival to unit and care assumed. Patient transported with a Registered Nurse, [address] [place] [address] / Patient Care Tech, Monitor and Oxygen. Oxygen used for patient = @ 12 - 20 Liters.

## 2021-04-21 NOTE — Clinical Note
Sheath #1: Sheath: left in place. Site secured by suture and transparent dressing. Hemostasis achieved.

## 2021-04-21 NOTE — ED PROVIDER NOTES
The history is provided by the patient. No  was used. Fatigue This is a new problem. The current episode started more than 2 days ago. The problem has not changed since onset. There was no focality noted. Pertinent negatives include no focal weakness, no loss of sensation, no loss of balance, no slurred speech, no speech difficulty, no memory loss, no movement disorder, no agitation, no visual change, no auditory change, no mental status change, no unresponsiveness and no disorientation. There has been no fever. Associated symptoms include nausea. Pertinent negatives include no shortness of breath, no chest pain, no vomiting, no altered mental status, no confusion, no headaches, no choking, no bowel incontinence and no bladder incontinence. There were no medications administered prior to arrival.  
  
 
Past Medical History:  
Diagnosis Date  BPH (benign prostatic hyperplasia)  CKD (chronic kidney disease) 5/28/15 pt denies kidney disease  DDD (degenerative disc disease), lumbar  Elevated PSA  Heart murmur Dr Negin Sanders  Hoarse   
 x6 months as of 5/28/15; being evaluated by Dr Azalea Alvarez  Hyperlipidemia  Hypertension Dr Catalina Mann  Osteoarthritis, shoulder  Positive PPD, treated 1990  
 treated with INH  Sensorineural hearing loss   
 as of 5/28/15 pt wears hearing aids  Unspecified adverse effect of anesthesia 1960s  
 delayed awaking Past Surgical History:  
Procedure Laterality Date  HX CATARACT REMOVAL Bilateral 2004  HX COLONOSCOPY  2006 [address] [place] [address]  HX INTRAOCULAR LENS PROSTHESIS    
 bilateral  
 HX LUMBAR LAMINECTOMY  2006 Leodan Carroll  HX MOHS PROCEDURES  prior to 2014  
 and bicep repair  TOTAL KNEE ARTHROPLASTY Right 8/4/14 Family History:  
Problem Relation Age of Onset  Cancer Father   
     lung  Stroke Mother  Diabetes Mother  Diabetes Sister  Cancer Sister Social History Socioeconomic History  Marital status:  Spouse name: Not on file  Number of children: 1  Years of education: Not on file  Highest education level: Not on file Occupational History  Occupation: retired Social Needs  Financial resource strain: Not on file  Food insecurity Worry: Not on file Inability: Not on file  Transportation needs Medical: Not on file Non-medical: Not on file Tobacco Use  Smoking status: Former Smoker Packs/day: 1.00 Years: 26.00 Pack years: 26.00 Types: Cigarettes Quit date: 1976 Years since quittin.3  Smokeless tobacco: Never Used Substance and Sexual Activity  Alcohol use: Yes Alcohol/week: 1.0 standard drinks Types: 1 Shots of liquor per week Comment: 4-5x week burbon in afternoon  Drug use: No  
 Sexual activity: Never Lifestyle  Physical activity Days per week: Not on file Minutes per session: Not on file  Stress: Not on file Relationships  Social connections Talks on phone: Not on file Gets together: Not on file Attends [employer] service: Not on file Active member of club or organization: Not on file Attends meetings of clubs or organizations: Not on file Relationship status: Not on file  Intimate partner violence Fear of current or ex partner: Not on file Emotionally abused: Not on file Physically abused: Not on file Forced sexual activity: Not on file Other Topics Concern  Not on file Social History Narrative  lives on his own ALLERGIES: Patient has no known allergies. Review of Systems Constitutional: Positive for fatigue. Respiratory: Negative for choking and shortness of breath. Cardiovascular: Negative for chest pain. Gastrointestinal: Positive for nausea. Negative for bowel incontinence and vomiting.   
Genitourinary: Negative for bladder incontinence. Neurological: Negative for focal weakness, speech difficulty, headaches and loss of balance. Psychiatric/Behavioral: Negative for agitation, confusion and memory loss. Vitals:  
 21 1826 BP: (!) 145/121 Pulse: (!) 33 Resp: 18 Temp: 98.3 °F (36.8 °C) SpO2: 96% Weight: 90.7 kg (200 lb) Height: 5' 7\" (1.702 m) Physical Exam 
Vitals signs and nursing note reviewed. Constitutional:   
   General: He is not in acute distress. Appearance: He is well-developed. He is not diaphoretic. HENT:  
   Head: Normocephalic and atraumatic. Eyes:  
   Pupils: Pupils are equal, round, and reactive to light. Neck: Musculoskeletal: Normal range of motion and neck supple. Cardiovascular:  
   Rate and Rhythm: Regular rhythm. Bradycardia present. Heart sounds: Normal heart sounds. No murmur. No friction rub. No gallop. Pulmonary:  
   Effort: Pulmonary effort is normal. No respiratory distress. Breath sounds: Normal breath sounds. No wheezing. Abdominal:  
   General: Bowel sounds are normal. There is no distension. Palpations: Abdomen is soft. Tenderness: There is no abdominal tenderness. There is no guarding or rebound. Musculoskeletal: Normal range of motion. Right lower le+ Edema present. Left lower le+ Edema present. Skin: 
   General: Skin is warm. Findings: No rash. Neurological:  
   Mental Status: He is alert and oriented to person, place, and time. Psychiatric:     
   Behavior: Behavior normal.     
   Thought Content: Thought content normal.     
   Judgment: Judgment normal.  
 
  
 
MDM This is a 70-year-old male with past medical history, review of systems, physical exam as above, presenting with complaints of fatigue and generalized weakness, found to be bradycardic and hypotensive by EMS.   Per report, patient with several days of symptoms, upon arrival patient endorses generalized weakness and nausea denies chest pain or shortness of breath. Physical exam is remarkable for chronically ill-appearing elderly male, in no acute distress with 2+ pitting edema in the bilateral lower extremities, with clear breath sounds, soft abdomen, bradycardic rate without murmurs gallops or rubs. Pressures are 90s over 40s with a MAP of 55. Heart rate in the 30s. Chart review indicates a history of coronary artery disease, status post stenting, beta-blocker prescribed making new onset bradycardia likely. EKG review indicates likely third-degree heart block. Plan to give small fluid bolus, obtain CMP, CBC, coags, cardiac enzymes, and consultation with cardiology. Patient will require admission for further care and evaluation. Intubation Date/Time: 4/21/2021 9:00 PM 
Performed by: Darrin Mckeon MD 
Authorized by: Darrin Mckeon MD  
 
Consent:  
  Consent obtained:  Emergent situation Pre-procedure details:  
  Patient status:  Unresponsive Mallampati score:  II 
  Pretreatment medications:  None Paralytics:  Rocuronium Procedure details:  
  Preoxygenation:  Nonrebreather mask CPR in progress: no Intubation method:  Oral 
  Oral intubation technique:  Video-assisted Laryngoscope blade: Mac 4 Tube size (mm):  7.5 Tube type:  Cuffed Number of attempts:  2 Ventilation between attempts: no   
  Cricoid pressure: no   
  Tube visualized through cords: no   
Placement assessment: ETT to lip:  23 Tube secured with:  ETT velasco Breath sounds:  Equal and absent over the epigastrium Placement verification: chest rise, condensation, CXR verification, equal breath sounds, ETCO2 detector and tube exhalation CXR findings:  ETT in proper place Post-procedure details:  
  Patient tolerance of procedure: Tolerated well, no immediate complications Critical Care Performed by: Darrin Mckeon MD 
Authorized by: Darrin Mckeon MD  
 
Critical care provider statement:  
  Critical care time (minutes):  65 
  Critical care time was exclusive of:  Separately billable procedures and treating other patients Critical care was necessary to treat or prevent imminent or life-threatening deterioration of the following conditions:  Cardiac failure, circulatory failure and respiratory failure Critical care was time spent personally by me on the following activities:  Blood draw for specimens, development of treatment plan with patient or surrogate, discussions with consultants, evaluation of patient's response to treatment, examination of patient, obtaining history from patient or surrogate, ordering and performing treatments and interventions, ordering and review of laboratory studies, ordering and review of radiographic studies, pulse oximetry, re-evaluation of patient's condition, review of old charts and transcutaneous pacing I assumed direction of critical care for this patient from another provider in my specialty: no   
 
 
 
7:03 PM 
Pateint HR dropping into the 20's with worsening BP and symptoms, began pacing at 100mA, rate 60 after 0.5mg ativan. 
 
7:46 PM 
Patient receiving versed for discomfort associated with pacer, converted to sinus tach with RBBB, elevated trop, cardiology interventionalist paged. 8:01 PM 
Patient d/w Dr. Alphonse Watkins (Cardiology) who recommended admission, states patient no longer in need of acute intervention. Perfect Serve Consult for Admission 8:04 PM 
 
ED Room Number: BE02/40 Patient Name and age:  Cydne Homans 80 y.o.  male Working Diagnosis:  
1. Third degree heart block ([address] 2. Bradycardia 3. Elevated troponin COVID-19 Suspicion:  no 
Sepsis present:  no  Reassessment needed: yes Code Status:  Full Code Readmission: no 
Isolation Requirements:  no 
Recommended Level of Care:  ICU Department:[facility] ED - [phone] Other:  D/w Dr. Virgil Caldwell 9:15 PM 
Called to bedside for worsening bradycardia, noted agonal respirations, pulses lost and fine vfib noted on the monitor, shocked x1 @ 200j biphasic, resumed ROSC with arrhythmia, attempted to pace without response, ROSC noted on bedside US, administered amiodarone push and started drip, lost pulses with once cycle of chest compressions, +ROSC, intubated with mild difficulty, amiodarone drip + epi drip, propofol for sedation. D/w Dr. Frankey Boatman (Cardiology) who will come to bedside anticipate going to the cath lab.

## 2021-04-21 NOTE — ED TRIAGE NOTES
Pt arrives via EMS from [facility] with complaints of weakness, bradycardia, hypotension. EMS states that HR was in the 30's with a pressure of 82/46. Pt currently has a HR of 33 with a BP of 94/42.

## 2021-04-21 NOTE — Clinical Note
Sheath #1: sheath exchanged for SHEATH INTRO 8FR L10CM MINI GWIRE L45CM 0.035IN COR KINK. Hemostasis achieved.

## 2021-04-21 NOTE — Clinical Note
Sheath #1: Closed using manual compression. Site secured by Tegaderm. Pressure held for: 14 minutes.

## 2021-04-21 NOTE — ED NOTES
Pt HR decreased to the 20's. Pacing started by Dr. Jaiden Quinn with 200J and 40 amp as output. HR is currently 77.

## 2021-04-21 NOTE — PROGRESS NOTES
[facility]: MED RECONCILIATION Comments/Recommendations:  
 
Patient unable to confirm name, , allergies and preferred pharmacy Med rec completed with med list from facility Information obtained from: Transfer Papers Allergies: Patient has no known allergies. Prior to Admission Medications:  
 
Prior to Admission Medications Prescriptions Last Dose Informant Patient Reported? Taking?  
acetaminophen (TYLENOL) 500 mg tablet 2021 at Unknown time Transfer Papers Yes Yes Sig: Take 1,000 mg by mouth two (2) times daily as needed for Pain. amLODIPine (NORVASC) 5 mg tablet 2021 at Unknown time Transfer Papers Yes Yes Sig: Take 5 mg by mouth every morning. aspirin delayed-release 81 mg tablet 2021 at Unknown time Transfer Papers Yes Yes Sig: Take 81 mg by mouth daily. clopidogrel (PLAVIX) 75 mg tab 2021 at Unknown time Transfer Papers Yes Yes Sig: Take 75 mg by mouth daily. diclofenac (Voltaren) 1 % gel 2021 at Unknown time Transfer Papers Yes Yes Sig: Apply 1 g to affected area nightly.  
gabapentin (NEURONTIN) 300 mg capsule 2021 at Unknown time Transfer Papers Yes Yes Sig: Take 300 mg by mouth two (2) times a day. levothyroxine (SYNTHROID) 50 mcg tablet 2021 at Unknown time Transfer Papers Yes Yes Sig: Take 50 mcg by mouth Daily (before breakfast). losartan (COZAAR) 100 mg tablet 2021 at Unknown time Transfer Papers No Yes Sig: Take 1 Tab by mouth daily. magnesium hydroxide (Dior Milk of Magnesia) 400 mg/5 mL suspension 2021 at Unknown time Transfer Papers Yes Yes Sig: Take 30 mL by mouth daily as needed for Constipation. metoprolol tartrate (LOPRESSOR) 25 mg tablet 2021 at Unknown time Transfer Papers Yes Yes Sig: Take 25 mg by mouth two (2) times a day. pantoprazole (PROTONIX) 40 mg tablet 2021 at Unknown time Transfer Papers Yes Yes Sig: Take 40 mg by mouth daily.   
tetrahydrozoline-zinc (Visine-AC) 0. 05-0.25 % ophthalmic solution 4/14/2021 at Unknown time Transfer Papers Yes Yes Sig: Administer 2 Drops to both eyes four (4) times daily as needed. Facility-Administered Medications: None Vidal Jimenez Pharm. D. Clinical Staff Pharmacist 
[facility] [phone]

## 2021-04-21 NOTE — Clinical Note
Verbal telephone handoff provided to receiving unit. SBAR to be given at the bedside with receiving RN.

## 2021-04-21 NOTE — Clinical Note
TRANSFER - IN REPORT:     Verbal report received from: Bedside RN. Report consisted of patient's Situation, Background, Assessment and   Recommendations(SBAR). Opportunity for questions and clarification was provided. Assessment completed upon patient's arrival to unit and care assumed. Patient transported with a Registered Nurse, [address] [place] [address] / Patient Care Tech, Monitor and Oxygen.  vented

## 2021-04-22 NOTE — ED NOTES
TRANSFER - OUT REPORT: 
 
Verbal report given to Viola Clayton RN (name) on Neo Quinteros  being transferred to Cath Lab (unit) for ordered procedure Report consisted of patients Situation, Background, Assessment and  
Recommendations(SBAR). Information from the following report(s) SBAR, ED Summary, [facility] and Recent Results was reviewed with the receiving nurse. Lines:  
Peripheral IV 04/21/21 Right Forearm (Active) Peripheral IV 04/21/21 Left Antecubital (Active) Site Assessment Clean, dry, & intact 04/21/21 1848 Phlebitis Assessment 0 04/21/21 1848 Infiltration Assessment 0 04/21/21 1848 Dressing Status Clean, dry, & intact 04/21/21 1848 Dressing Type Transparent 04/21/21 1848 Hub Color/Line Status Pink 04/21/21 1848 Opportunity for questions and clarification was provided. Patient transported with: 
 Registered Nurse

## 2021-04-22 NOTE — PROCEDURES
BRIEF PROCEDURE NOTE Date of Procedure: 4/21/2021 Preoperative Diagnosis: Bradycardia/CHB/Cardiopulm arrest 
Postoperative Diagnosis:  Non obstructive CAD Procedure: Left heart cath, LV angiography, coronary angiography Interventional Cardiologist: Jose Vann MD 
Assistant : none Anesthesia: local + IV sedation Estimated Blood Loss: Minimal 
Findings:   
 
R CFA - micropuncture; ultrasound, fluoroscopic guided access - 6 F sheath 
R FV -  micropuncture; ultrasound, fluoroscopic guided access; 9 F cordis 5 F balloon tip/Temp pacer wire inserted - 60 bpm/5 mA  
 
 
L Main: Short; Nml 
 
LAD: Med; MLI; D1 - MLI 
 
LCflex: Med; MLI 
 
RCA: Med Dominant; Mid 50%; Distal stent patent; PLB - TAM; PDA - small - ostial 80% LVEDP: 26 mm Hg LVEF: Hand injection / No well visualised Straight wire used to cross valve - AV - mod stenosis - Peak to peak gradient 30 mm Hg PCI: none Specimens Removed : None Complications: None Closure Device: R CFA - manual 
 
R FV - Cordis sutured in situ Pt was in SVT - prob sinus tach vs aflutter with 2:1 block  - 130bpm - Epi weaned off and Levophed gtt weaned off. SBP dec from 170 -110's. When AV was crossed - SVT changed to paced rhythm - 60bpm.  SBP in 100's. Levophed gtt restarted at [employer] See full cath note. Complications: none Jose Vann MD

## 2021-04-22 NOTE — PROGRESS NOTES
Problem: Non-Violent Restraints Goal: Removal from restraints as soon as assessed to be safe Outcome: Progressing Towards Goal 
Goal: No harm/injury to patient while restraints in use Outcome: Progressing Towards Goal 
Goal: Patient's dignity will be maintained Outcome: Progressing Towards Goal 
Goal: Patient Interventions Outcome: Progressing Towards Goal 
  
Problem: Ventilator Management Goal: *Adequate oxygenation and ventilation Outcome: Progressing Towards Goal 
Goal: *Patient maintains clear airway/free of aspiration Outcome: Progressing Towards Goal 
Goal: *Absence of infection signs and symptoms Outcome: Not Progressing Towards Goal 
Goal: *Normal spontaneous ventilation Outcome: Progressing Towards Goal 
  
Problem: Falls - Risk of 
Goal: *Absence of Falls Description: Document Delmy Tran Fall Risk and appropriate interventions in the flowsheet. Outcome: Progressing Towards Goal 
Note: Fall Risk Interventions: 
  
 
  
 
Medication Interventions: Bed/chair exit alarm Elimination Interventions: Bed/chair exit alarm, Call light in reach History of Falls Interventions: Bed/chair exit alarm, Evaluate medications/consider consulting pharmacy, Room close to nurse's station Problem: Patient Education: Go to Patient Education Activity Goal: Patient/Family Education Outcome: Progressing Towards Goal 
  
Problem: Pressure Injury - Risk of 
Goal: *Prevention of pressure injury Description: Document Fabio Scale and appropriate interventions in the flowsheet. Outcome: Progressing Towards Goal 
Note: Pressure Injury Interventions: 
Sensory Interventions: Assess changes in LOC, Assess need for specialty bed, Check visual cues for pain, Float heels, Keep linens dry and wrinkle-free, Maintain/enhance activity level, Pad between skin to skin, Monitor skin under medical devices, Minimize linen layers, Turn and reposition approx. every two hours (pillows and wedges if needed) Moisture Interventions: Absorbent underpads, Apply protective barrier, creams and emollients, Check for incontinence Q2 hours and as needed, Internal/External urinary devices, Minimize layers, Maintain skin hydration (lotion/cream), Moisture barrier Activity Interventions: Assess need for specialty bed, Increase time out of bed, Pressure redistribution bed/mattress(bed type), PT/OT evaluation Mobility Interventions: Assess need for specialty bed, Pressure redistribution bed/mattress (bed type), PT/OT evaluation, Turn and reposition approx. every two hours(pillow and wedges) Nutrition Interventions: Discuss nutritional consult with provider, Document food/fluid/supplement intake Friction and Shear Interventions: Apply protective barrier, creams and emollients, HOB 30 degrees or less, Lift sheet, Lift team/patient mobility team, Minimize layers, Transferring/repositioning devices Problem: Patient Education: Go to Patient Education Activity Goal: Patient/Family Education Outcome: Progressing Towards Goal

## 2021-04-22 NOTE — PROGRESS NOTES
[facility] 
[address] [place] [address] 
[phone] Medical Progress Note NAME: Sergio Resendez :  1931 MRM:  [id number] Date/Time of service: 2021  4:45 PM 
 
  
Subjective: Chief Complaint:  Patient was personally seen and examined by me during this time period. Chart reviewed. Remains intubated and sedated. Unable to obtain review systems due to intubation and sedation. Objective:  
 
 
Vitals:  
 
 
Last 24hrs VS reviewed since prior progress note. Most recent are: 
 
Visit Vitals BP (!) 122/54 Pulse 60 Temp 100.3 °F (37.9 °C) Resp 20 Ht 5' 7\" (1.702 m) Wt 90.7 kg (199 lb 15.3 oz) SpO2 98% BMI 31.32 kg/m² SpO2 Readings from Last 6 Encounters:  
21 98% 07/10/19 95% 18 93% 18 96% 18 97% 18 98% O2 Flow Rate (L/min): 10 l/min Intake/Output Summary (Last 24 hours) at 2021 1645 Last data filed at 2021 1600 Gross per 24 hour Intake 1155.75 ml Output 430 ml Net 725.75 ml Exam:  
 
Physical Exam: 
 
Gen: Intubated HEENT: nontraumatic Resp:  No accessory muscle use, clear breath sounds without wheezes rales or rhonchi 
Card:  RRR, No murmurs, normal S1, S2, b/l peripheral edema Abd:  Soft, non-tender, non-distended, normoactive bowel sounds are present Musc:  No cyanosis or clubbing Skin: Diffuse ecchymosis Neuro: Sedated Psych: Sedated Medications Reviewed: (see below) Lab Data Reviewed: (see below) 
 
______________________________________________________________________ Medications:  
 
Current Facility-Administered Medications Medication Dose Route Frequency  cefTRIAXone (ROCEPHIN) 1 g in sterile water (preservative free) 10 mL IV syringe  1 g IntraVENous Q24H  
 famotidine (PF) (PEPCID) 20 mg in 0.9% sodium chloride 10 mL injection  20 mg IntraVENous Q24H  
 lactated Ringers infusion  75 mL/hr IntraVENous CONTINUOUS  propofol (DIPRIVAN) 10 mg/mL infusion  0-50 mcg/kg/min IntraVENous TITRATE  sodium chloride (NS) flush 5-40 mL  5-40 mL IntraVENous Q8H  
 sodium chloride (NS) flush 5-40 mL  5-40 mL IntraVENous PRN  
 acetaminophen (TYLENOL) tablet 650 mg  650 mg Oral Q6H PRN Or  
 acetaminophen (TYLENOL) suppository 650 mg  650 mg Rectal Q6H PRN  polyethylene glycol (MIRALAX) packet 17 g  17 g Oral DAILY PRN  promethazine (PHENERGAN) tablet 12.5 mg  12.5 mg Oral Q6H PRN Or  
 ondansetron (ZOFRAN) injection 4 mg  4 mg IntraVENous Q6H PRN  
 NOREPINephrine (LEVOPHED) 8 mg in 5% dextrose 250mL (32 mcg/mL) infusion  0.5-16 mcg/min IntraVENous TITRATE Lab Review:  
 
Recent Labs  
  04/22/21 
8873 04/21/21 
1831 WBC 8.1 8.1 HGB 10.4* 11.3* HCT 33.2* 35.1*  
 169 Recent Labs  
  04/22/21 
5159 04/21/21 
1831 * 136  
K 4.6 4.7  106 CO2 23 22 * 149* BUN 48* 45* CREA 2.17* 2.16* CA 8.4* 8.7 MG 2.4 2.4 PHOS 3.2  --   
ALB 3.0* 3.5 TBILI 0.4 0.5 ALT 48 42 INR 1.1 1.0 Lab Results Component Value Date/Time Glucose (POC) 116 (H) 08/05/2014 08:59 PM  
 
 
  
Assessment / Plan:  
 
Acute respiratory failure 
-Likely due to cardiac etiology 
-Remains intubated -Vent management per intensivist 
 
Cardiopulmonary arrest 
-Status post complete heart block status post pacer, ventricular fibrillation with asystole and then aflutter 
-Status post epinephrine, amiodarone drip 
-Currently on Levophed  
-Cardiology following Multiple arrhythmias 
-Status post complete heart block status post pacer, ventricular fibrillation with asystole and then aflutter 
-Cardiology evaluation for possible pacemaker Elevated troponins 
-Likely due to arrest and discoloration 
-Status post cath no remarkable coronary disease 
-Cardiology following BARAK/CKD 
-Likely due hypoperfusion due to arrest and hypotension 
-Avoid nephrotoxins 
-Monitor renal function Concern for pneumonia 
-Low suspicion 
-Continue 5 days ceftriaxone course Total time spent with patient: 28  Minutes **I personally saw and examined the patient during this time period** Care Plan discussed with: Patient, Family, Nursing Staff and Consultant/Specialist 
 
Discussed:  Code Status and Care Plan Prophylaxis:  Hep SQ Disposition:  SAH/Rehab 
        
___________________________________________________ Attending Physician: Karen Bailey DO

## 2021-04-22 NOTE — ED NOTES
TRANSFER - OUT REPORT: 
 
Verbal report given to  
isai(name) on Abeba Wu  being transferred to ICU after cath lab procedure(unit) for routine progression of care Report consisted of patients Situation, Background, Assessment and  
Recommendations(SBAR). Information from the following report(s) SBAR, ED Summary and MAR was reviewed with the receiving nurse. Lines:  
Peripheral IV 04/21/21 Right Forearm (Active) Peripheral IV 04/21/21 Left Antecubital (Active) Site Assessment Clean, dry, & intact 04/21/21 1848 Phlebitis Assessment 0 04/21/21 1848 Infiltration Assessment 0 04/21/21 1848 Dressing Status Clean, dry, & intact 04/21/21 1848 Dressing Type Transparent 04/21/21 1848 Hub Color/Line Status Pink 04/21/21 1848 Opportunity for questions and clarification was provided. Patient transported with: 
 cath lab

## 2021-04-22 NOTE — PROGRESS NOTES
Reason for Admission:  CHB/bradycardia,hypotension RUR Score:        15% meaning low risk for a future readmission Plan for utilizing home health:      May need SNF 
 
PCP: First and Last name:  Scar Louie MD 
 
 Name of Practice:  
 Are you a current patient: Yes/No: yes Approximate date of last visit:  
 Can you participate in a virtual visit with your PCP: yes Current Advanced Directive/Advance Care Plan: Full Code Healthcare Decision Maker:  
 
Lou Lucas ,[place] [phone] and [phone] This is pt.'s daughter. Transition of Care Plan: Pt was admitted in complete heart block and bradycardia. Pt went into cardiac arrest  Pt had an emergent temporary pacer placed and was on an epinephrine and levophed gtt. Pt is currently off the unit in [place] . Plan is for pt to have a permanent pacemaker placed today. Pt lives @ [id number] [place] [address] Assisted Living. Information has been provided to [facility] through [employer]. Pt remains intubated (Peep 8,Fio2 80%) Daughter is sleeping in pt.'s room so I will follow-up with her tomorrow as she is exhausted from all the events surrounding her farther's hospitalization. I will meet with daughter tomorrow to begin disposition planning.  
 
Meyer Bright 
CM

## 2021-04-22 NOTE — PROGRESS NOTES
Bedside shift change report given to [id number] (oncoming nurse) by Shefali Jackson RN (offgoing nurse). Report included the following information SBAR, ED Summary, Procedure Summary, Intake/Output, Recent Results, Cardiac Rhythm Paced, Quality Measures and Dual Neuro Assessment. Primary Nurse Minda Rizzo, MARTINEZ and MARTINEZ Muñoz performed a dual skin assessment on this patient No impairment noted 0800- Assessment completed- see flowsheet. Medications administered. Patient turned and repositioned, mouth care and gail care completed, and patient suctioned with scant white thick secretions present. Transvenous pacer present in right groin, pacing at 60 BPM and capturing at 5 amps. Jazmine Flores with cardiology at bedside to see patient- echo ordered and amio drip stopped per order. Tele-intensivist in room to see patient- order received for cooling blanket and restraint renewal. Notified [facility] of patient's most recent triponin. LR started at 75mL/hr. Ice packs placed on patient for bladder temperature of 101.2 
1000- Patient turned and repositioned, mouth care complete and patient suctioned with scant clear thin secretions present. Patient moves all extremities and grimaces to suction, cough weak. Patient's daughter Lorena Chol at bedside- updated on patient's condition and plans for today. ZUCHEM provided POA and advanced directive paperwork that was then copied and placed on chart, original given back to ZUCHEM. 1030- Vascular tech in room to complete echo. 1100- IDRs completed, new order received for CT head. 1200- Patient turned and repositioned, mouth care completed and patient suctioned with minimal secretions present. 1230- Patient to CT with ventilator, IV drips and lifepak. 1310- Patient back to room from CT. Electrophysiology MD in to see patient at this time, updated on reason for CT head and plans for patient.   
1400- Patient turned and repositioned, mouth care completed and suctioned with no secretions present. Levophed titrated up for blood pressure as low as 67/48- see flowsheet for titrations. 1600- Reassessment completed, see flowsheet. Patient turned and repositioned, mouth care and gail care completed, suctioned with no secretions present.  in room to see patient and talk with patient's daughter, Angel Heard. 1800- Patient turned and repositioned, mouth care completed and patient suctioned with no secretions present. Medications administered.

## 2021-04-22 NOTE — ED NOTES
Patient noted to be asystole on the monitor. Pt did not have pulse at that time, but was still breathing. Dr. Laura Bustos aware and in room

## 2021-04-22 NOTE — CONSULTS
Jose Vann MD., [facility] [address] [place], [address] [facility] Office [phone],DAP [phone]  
 
 
 
 
4/21/2021 Admit Date: 4/21/2021 Martin Hernandez is a 80 y.o. male admitted for No admission diagnoses are documented for this encounter. . Consult requested by Eduardo Barron MD    
 
Assessment/Plan: 
 
Bradycardia/CHB/Cardiopulmonary arrest 
Mod AS 
CAD - PCI to RCA 2017 BARAK on CKD Plan: 
Emergent temp pacer LHC Wean of epi gtt On Levo gtt ECHO Please do not hesitate to contact us with questions or concerns. See note below for details. Jose Vann MD 
 
 
Cardiac Testing/Procedures: A. Cardiac Cath/PCI: 
 
B.ECHO/RAMAN: 
 
C.StressNuclear/Stress ECHO/Stress test: D.Vascular: 
 
E. EP: 
 
F. Miscellaneous: 
 
History:  
 
Patient  is a 80 y.o. male who was brought to ED for weakness for 2-3 days. No CP/dyspnea. Pt now intubated. Hx for ED MD/chart/pt's daughter Iam Hernandez) who is with the patient. On arrival to ED pt was bradycardic with HR in 30's. ( EKG 1822 hrs - Prob SR with CHB with junctional donis - 34bpm - IVCD; ant/inf STT changes). He then converted to SR with RBBB and was admitted to hospital.  
He then developed intermitted irregular tachycardia with RBBB pattern which broke transiently and then recurred ( EKG - 2050hrs). He lost pulse and was noted to be in Vfib. CPR/ROSC obtained by ED MD and pt intubated. Started on epi gtt/amio gtt. HR in 120's - Sinus tach/RBBB/. Prior cardiac Hx - Mod AS/ PCI to RCA 2017 PMH/PSH/FH/Soc Hx:  
 
Past Medical History:  
Diagnosis Date  BPH (benign prostatic hyperplasia)  CKD (chronic kidney disease) 5/28/15 pt denies kidney disease  DDD (degenerative disc disease), lumbar  Elevated PSA  Endocrine disease Chronic kidney disease  Heart murmur Dr Aditi Tangarse   
 x6 months as of 5/28/15; being evaluated by Dr oRbison Killer  Hyperlipemia  Hyperlipidemia  Hypertension Dr Rachel Layne  Hypothyroidism  Ill-defined condition   
 aortic stenosis  Ill-defined condition   
 benign prostatic hyperplasia  Ill-defined condition   
 heart murmur  Osteoarthritis, shoulder  Osteoporosis  Positive PPD, treated   
 treated with INH  Sensorineural hearing loss   
 as of 5/28/15 pt wears hearing aids  Sleep disorder   
 insomnia  Unspecified adverse effect of anesthesia 1960s  
 delayed awaking Past Surgical History:  
Procedure Laterality Date  HX CATARACT REMOVAL Bilateral 2004  HX COLONOSCOPY  2006 [address]  HX INTRAOCULAR LENS PROSTHESIS    
 bilateral  
 HX LUMBAR LAMINECTOMY   Raymond Cheng Dr Tor Lipoma  HX MOHS PROCEDURES  prior to   
 and bicep repair  [place] TOTAL KNEE ARTHROPLASTY Right 14 No Known Allergies Family History Problem Relation Age of Onset  Cancer Father   
     lung  Stroke Mother  Diabetes Mother  Diabetes Sister  Cancer Sister Social History Tobacco Use  Smoking status: Former Smoker Packs/day: 1.00 Years: 26.00 Pack years: 26.00 Types: Cigarettes Quit date: 1976 Years since quittin.3  Smokeless tobacco: Never Used Substance Use Topics  Alcohol use: Yes Alcohol/week: 1.0 standard drinks Types: 1 Shots of liquor per week Comment: 4-5x week burbon in afternoon  Drug use: No  
 
 
 
 
Medications:  
 
 
Current Facility-Administered Medications Medication Dose Route Frequency  midazolam (VERSED) 1 mg/mL injection Current Outpatient Medications Medication Sig  
 magnesium hydroxide (Dior Milk of Magnesia) 400 mg/5 mL suspension Take 30 mL by mouth daily as needed for Constipation.  pantoprazole (PROTONIX) 40 mg tablet Take 40 mg by mouth daily.   
 metoprolol tartrate (LOPRESSOR) 25 mg tablet Take 25 mg by mouth two (2) times a day.  
 diclofenac (Voltaren) 1 % gel Apply 1 g to affected area nightly.  levothyroxine (SYNTHROID) 50 mcg tablet Take 50 mcg by mouth Daily (before breakfast).  tetrahydrozoline-zinc (Visine-AC) 0.05-0.25 % ophthalmic solution Administer 2 Drops to both eyes four (4) times daily as needed.  gabapentin (NEURONTIN) 300 mg capsule Take 300 mg by mouth two (2) times a day.  aspirin delayed-release 81 mg tablet Take 81 mg by mouth daily.  acetaminophen (TYLENOL) 500 mg tablet Take 1,000 mg by mouth two (2) times daily as needed for Pain.  clopidogrel (PLAVIX) 75 mg tab Take 75 mg by mouth daily.  amLODIPine (NORVASC) 5 mg tablet Take 5 mg by mouth every morning.  losartan (COZAAR) 100 mg tablet Take 1 Tab by mouth daily. Review of Systems: As in HPI - limited Physical Exam:  
 
 
Visit Vitals BP (!) 102/58 Pulse 99 Temp 98.3 °F (36.8 °C) Resp 28 Ht 5' 7\" (1.702 m) Wt 200 lb (90.7 kg) SpO2 96% BMI 31.32 kg/m² Telemetry: 
 
Gen: Well-developed, well-nourished,intubated Neck: Supple, Resp: No accessory muscle use, coarse BS bilat Card: Regular Rate,Rythm,Normal S1, S2, 2/6 sys  Murmur+, Abd:  Soft, BS+,  
MSK: No cyanosis Skin: No rashes Neuro: moving all four extremities , follows commands appropriately Psych:  Good insight, oriented to person, place , alert, Nml Affect LE: No edema EKG: 
 
 
 
Cxray: Reviewed LABS: Reviewed Care Plan discussed with: Nursing Staff Total time:      mins Mago Aparicio MD

## 2021-04-22 NOTE — PROGRESS NOTES
Pharmacy changed famotidine to 20 mg daily, for CrCl of 25 ml/min, per renal protocol. Pharmacy will follow daily.

## 2021-04-22 NOTE — PROGRESS NOTES
Cardiology Progress Note       ICU  
NAME:  Sergio Resendez :   1931 MRN:   [id number] Critically ill Assessment/Plan: 1. Acute resp failure s/p intubation: vent mgt per intensivist  
2. S/P cardiopulmonary arrest ( bradycardia/asystole) : cath with nonobstructive CAD: ECHO today. Wean pressor. 3. A flutter: stop amio . Rhythm is paced 60's 4. CHB: s/p temp pacer. Needs permanent PPM +/- ICD based on LV function on ECHO. Ck pBNP. 5. BARAK/CKD: Cr 2.1 Pt personally seen and examined. Chart reviewed. Agree with advanced NP's history, exam and  A/P with changes/additons. On Vent / Per RN moving ext and trying to remove ET tube Blood pressure (!) 119/51, pulse 60, temperature 100.3 °F (37.9 °C), resp. rate 24, height 5' 7\" (1.702 m), weight 199 lb 15.3 oz (90.7 kg), SpO2 98 %. Paced 100% CVS-S1-S2 present,   2/6 systolic murmur present RS-   Coarse BS ant 
 
21 ECHO ADULT COMPLETE 2021 Narrative · LV: Calculated LVEF is 55%. Visually measured ejection fraction. Normal  
cavity size, wall thickness and systolic function (ejection fraction  
normal). Wall motion: normal. 
· MV: Mitral valve thickening. Mitral valve leaflet calcification. Moderate mitral annular calcification. · AV: Aortic valve leaflet calcification present. · Technically difficult due to patient position. Limited views available. Signed by: Sarah Cordova MD  
 
 
 
A/P -Will need PPM. EP consulted - Dr Josue Farrell Discussed withgracie Granados. MD, [facility] Subjective:  
Patient  is a 80 y.o. male who was brought to ED for weakness for 2-3 days. No CP/dyspnea.  
  
On arrival to ED pt was bradycardic with HR in 30's. ( EKG 1822 hrs - Prob SR with CHB with junctional donis - 34bpm - IVCD; ant/inf STT changes).   
He then converted to SR with RBBB and was admitted to hospital.  
He then developed intermitted irregular tachycardia with RBBB pattern which broke transiently and then recurred ( EKG - 0hrs). He lost pulse and was noted to be in Vfib. CPR/ROSC obtained by ED MD and pt intubated. Started on epi gtt/amio gtt. HR in 120's - Sinus tach/RBBB/.  
 Taken to cath lab: Pt was in SVT - prob sinus tach vs aflutter with 2:1 block  - 130bpm - Epi weaned off and Levophed gtt weaned off. SBP dec from 170 -110's.  
  
When AV was crossed - SVT changed to paced rhythm - 60bpm.  SBP in 100's. Levophed gtt restarted at 5mcg 
  
 
 
 
Cardiac ROS: intubated/sedated Previous Cardiac Eval 
Prior cardiac Hx - Mod AS/ PCI to RCA 2017 Review of Systems: unable to obtain 2/2 intubation Objective:  
 
Visit Vitals /60 Pulse 60 Temp (!) 100.9 °F (38.3 °C) Resp 20 Ht 5' 7\" (1.702 m) Wt 90.7 kg (200 lb) SpO2 99% BMI 31.32 kg/m² O2 Flow Rate (L/min): 10 l/min O2 Device: Endotracheal tube, Ventilator Temp (24hrs), Av.9 °F (37.2 °C), Min:97.5 °F (36.4 °C), Max:100.9 °F (38.3 °C) No intake/output data recorded.  1901 -  0700 In: 369 [I.V.:369] Out: 235 [Urine:235] TELE: paced General: intubated/sedated HEENT: Orally intubated, OG, Atraumatic. Pink and moist.  Anicteric sclerae. Neck : Supple,  
Lungs: scattered rhonchi Heart: Regular rhythm, no murmur, no rubs, no gallops. No JVD. No carotid bruits. Abdomen: Soft, distended.  + Bowel sounds. : smith Extremities: No edema, no clubbing, no cyanosis. Neurologic: sedated Psych: intubated/sedated Care Plan discussed with: 
  Comments Patient Family RN x Care Manager Consultant:  x Data Review: No lab exists for component: ITNL Recent Labs  
  21 
0100 21 
1831 TROIQ 0.36* 0.16* Recent Labs  
  21 
3438 21 * 136  
K 4.6 4.7  106 CO2 23 22 BUN 48* 45* CREA 2.17* 2.16* * 149* PHOS 3.2  --   
MG 2.4 2. 4  
ALB 3.0* 3.5 WBC 8.1 8.1 HGB 10.4* 11.3* HCT 33.2* 35.1*  
 169 Recent Labs  
  04/22/21 
3439 04/21/21 
1831 INR 1.1 1.0 PTP 11.1 10.8 APTT  --  27.5 Medications reviewed Current Facility-Administered Medications Medication Dose Route Frequency  cefTRIAXone (ROCEPHIN) 1 g in sterile water (preservative free) 10 mL IV syringe  1 g IntraVENous Q24H  
 famotidine (PF) (PEPCID) 20 mg in 0.9% sodium chloride 10 mL injection  20 mg IntraVENous Q24H  propofol (DIPRIVAN) 10 mg/mL infusion  0-50 mcg/kg/min IntraVENous TITRATE  sodium chloride (NS) flush 5-40 mL  5-40 mL IntraVENous Q8H  
 sodium chloride (NS) flush 5-40 mL  5-40 mL IntraVENous PRN  
 acetaminophen (TYLENOL) tablet 650 mg  650 mg Oral Q6H PRN Or  
 acetaminophen (TYLENOL) suppository 650 mg  650 mg Rectal Q6H PRN  polyethylene glycol (MIRALAX) packet 17 g  17 g Oral DAILY PRN  promethazine (PHENERGAN) tablet 12.5 mg  12.5 mg Oral Q6H PRN Or  
 ondansetron (ZOFRAN) injection 4 mg  4 mg IntraVENous Q6H PRN  
 NOREPINephrine (LEVOPHED) 8 mg in 5% dextrose 250mL (32 mcg/mL) infusion  0.5-16 mcg/min IntraVENous TITRATE  amiodarone (CORDARONE) 375 mg in dextrose 5% 250 mL infusion  0.5-1 mg/min IntraVENous TITRATE Deleta Points, NP

## 2021-04-22 NOTE — CONSULTS
Pulmonary/Critical Care   Note Name:      Corine Echevarria                                  :   1931 Date:   2021 Hospital:     [address] [place] [address] Drive:  [id number] Resuscitation. Full SUBJECTIVE. SUMMARY. 40-year-old man resident of an assisted living facility has been experiencing increasing generalized weakness fatigue somnolence, nausea but no vomiting . He was found to be bradycardic down to a heart rate of 34 , eventually requiring insertion of a transvenous pacemaker. During his initial time in emergency room, he had ventricular fibrillation with asystole at which time he had CPR and cardioversion. In the course of events, he required intubation and mechanical ventilation. On initial presentation, he was actually not very dyspneic. No chest pain or palpitations. No reported loss of consciousness. No known history of cardiac arrhythmia. Existing comorbidities include hypertension, chronic kidney disease, hypothyroidism. The patient probably had contracted COVID pneumonia in 2021 Past Medical History:  
Diagnosis Date  BPH (benign prostatic hyperplasia)  CKD (chronic kidney disease) 5/28/15 pt denies kidney disease  DDD (degenerative disc disease), lumbar  Elevated PSA  Endocrine disease Chronic kidney disease  Heart murmur Dr Maryl Goldmann  Hoarse   
 x6 months as of 5/28/15; being evaluated by Dr Payal Hennessy  Hyperlipemia  Hyperlipidemia  Hypertension Dr Chantale Chavarria  Hypothyroidism  Ill-defined condition   
 aortic stenosis  Ill-defined condition   
 benign prostatic hyperplasia  Ill-defined condition   
 heart murmur  Osteoarthritis, shoulder  Osteoporosis  Positive PPD, treated   
 treated with INH  Sensorineural hearing loss   
 as of 5/28/15 pt wears hearing aids  Sleep disorder   
 insomnia  Unspecified adverse effect of anesthesia 1960s  
 delayed awaking Past Surgical History:  
Procedure Laterality Date  HX CATARACT REMOVAL Bilateral 2004  HX COLONOSCOPY  2006 [address]  HX INTRAOCULAR LENS PROSTHESIS    
 bilateral  
 HX LUMBAR LAMINECTOMY  2006 Jossie Mancuso [place]  HX MOHS PROCEDURES  prior to   
 and bicep repair  NH TOTAL KNEE ARTHROPLASTY Right 14 Family History Problem Relation Age of Onset  Cancer Father   
     lung  Stroke Mother  Diabetes Mother  Diabetes Sister  Cancer Sister Social History Tobacco Use  Smoking status: Former Smoker Packs/day: 1.00 Years: 26.00 Pack years: 26.00 Types: Cigarettes Quit date: 1976 Years since quittin.3  Smokeless tobacco: Never Used Substance Use Topics  Alcohol use: Yes Alcohol/week: 1.0 standard drinks Types: 1 Shots of liquor per week Comment: 4-5x week burbon in afternoon Current Facility-Administered Medications Medication Dose Route Frequency Provider Last Rate Last Admin  famotidine (PF) (PEPCID) 20 mg in 0.9% sodium chloride 10 mL injection  20 mg IntraVENous Q12H Kelvin Moses MD      
 cefTRIAXone (ROCEPHIN) 1 g in sterile water (preservative free) 10 mL IV syringe  1 g IntraVENous Q24H Kelvin Moses MD      
 midazolam (VERSED) 1 mg/mL injection  propofol (DIPRIVAN) 10 mg/mL infusion  0-50 mcg/kg/min IntraVENous TITRATE Serg Radford MD 10.9 mL/hr at 21 0131 20 mcg/kg/min at 21 0131  
 sodium chloride (NS) flush 5-40 mL  5-40 mL IntraVENous Q8H Maida Beltran MD   30 mL at 21 0103  
 sodium chloride (NS) flush 5-40 mL  5-40 mL IntraVENous PRN Maida Beltran MD      
 acetaminophen (TYLENOL) tablet 650 mg  650 mg Oral Q6H PRN Maida Beltran MD      
 Or  
 acetaminophen (TYLENOL) suppository 650 mg  650 mg Rectal Q6H PRN Maida Beltran MD      
 polyethylene glycol (MIRALAX) packet 17 g  17 g Oral DAILY PRN Natasha Rubi MD      
 promethazine [facility]) tablet 12.5 mg  12.5 mg Oral Q6H PRN Natasha Rubi MD      
 Or  
 ondansetron [facility]) injection 4 mg  4 mg IntraVENous Q6H PRN Natasha Rubi MD      
 NOREPINephrine (LEVOPHED) 8 mg in 5% dextrose 250mL (32 mcg/mL) infusion  0.5-16 mcg/min IntraVENous TITRATE DiskinDominick MD 11.3 mL/hr at 04/22/21 0151 6 mcg/min at 04/22/21 0151  
 amiodarone (CORDARONE) 375 mg in dextrose 5% 250 mL infusion  0.5-1 mg/min IntraVENous TITRATE Nallely Coto MD 40 mL/hr at 04/22/21 0039 1 mg/min at 04/22/21 0039 Review of Systems Objective Vital signs for last 24 hours: 
Visit Vitals BP (!) 120/49 Pulse 60 Temp (!) 100.9 °F (38.3 °C) Resp 20 Ht 5' 7\" (1.702 m) Wt 90.7 kg (200 lb) SpO2 100% BMI 31.32 kg/m² Intake/Output this shift: 
Current Shift: 04/21 1901 - 04/22 0700 In: 244.6 [I.V.:244.6] Out: 100 [Urine:100] Last 3 Shifts: No intake/output data recorded. PHYSICAL EXAMINATION: 
Telemedicine real-time synchronous audio/video (inpatient hospital) patient interaction with the assistance of the bedside nursing staff acting as presenter at the originating site, and my review of electronic medical records by access to the hospital electronic medical record system, and access to the digital radiology record system. Remote location of the physician is [place]. Vital signs. Intubated on assist control 14/400/100/8. ENT. No blood around the nose or mouth. No facial rash or swelling. . 
CHEST. Coarse breath sounds reported. No use of accessory muscles. HEART. Regular rhythm, all paced. ABDOMEN. Soft. No tenderness elicited. Bowel sounds normal. Not distended EXTREMITIES. + edema. Conchita Nutley NEUROLOGIC. Sedated on the ventilator SKELETAL. No major joint deformity. Radiographic studies.   Chest x-ray, reviewed by me, demonstrates bibasilar atelectasis/infiltrate. ET tube in reasonable position. Previous echocardiogram of November 2018 demonstrated normal ejection fraction of 60%. LABORATORY STUDIES. Data Review:  
Recent Results (from the past 24 hour(s)) CBC WITH AUTOMATED DIFF Collection Time: 04/21/21  6:31 PM  
Result Value Ref Range WBC 8.1 4.1 - 11.1 K/uL  
 RBC 3.83 (L) 4.10 - 5.70 M/uL  
 HGB 11.3 (L) 12.1 - 17.0 g/dL HCT 35.1 (L) 36.6 - 50.3 % MCV 91.6 80.0 - 99.0 FL  
 MCH 29.5 26.0 - 34.0 PG  
 MCHC 32.2 30.0 - 36.5 g/dL  
 RDW 13.9 11.5 - 14.5 % PLATELET 085 869 - 057 K/uL MPV 11.0 8.9 - 12.9 FL  
 NRBC 0.0 0  WBC ABSOLUTE NRBC 0.00 0.00 - 0.01 K/uL NEUTROPHILS 71 32 - 75 % LYMPHOCYTES 16 12 - 49 % MONOCYTES 11 5 - 13 % EOSINOPHILS 1 0 - 7 % BASOPHILS 0 0 - 1 % IMMATURE GRANULOCYTES 1 (H) 0.0 - 0.5 % ABS. NEUTROPHILS 5.8 1.8 - 8.0 K/UL  
 ABS. LYMPHOCYTES 1.3 0.8 - 3.5 K/UL  
 ABS. MONOCYTES 0.9 0.0 - 1.0 K/UL  
 ABS. EOSINOPHILS 0.0 0.0 - 0.4 K/UL  
 ABS. BASOPHILS 0.0 0.0 - 0.1 K/UL  
 ABS. IMM. GRANS. 0.0 0.00 - 0.04 K/UL  
 DF AUTOMATED METABOLIC PANEL, COMPREHENSIVE Collection Time: 04/21/21  6:31 PM  
Result Value Ref Range Sodium 136 136 - 145 mmol/L Potassium 4.7 3.5 - 5.1 mmol/L Chloride 106 97 - 108 mmol/L  
 CO2 22 21 - 32 mmol/L Anion gap 8 5 - 15 mmol/L Glucose 149 (H) 65 - 100 mg/dL BUN 45 (H) 6 - 20 MG/DL Creatinine 2.16 (H) 0.70 - 1.30 MG/DL  
 BUN/Creatinine ratio 21 (H) 12 - 20 GFR est AA 35 (L) >60 ml/min/1.73m2 GFR est non-AA 29 (L) >60 ml/min/1.73m2 Calcium 8.7 8.5 - 10.1 MG/DL Bilirubin, total 0.5 0.2 - 1.0 MG/DL  
 ALT (SGPT) 42 12 - 78 U/L  
 AST (SGOT) 39 (H) 15 - 37 U/L Alk. phosphatase 141 (H) 45 - 117 U/L Protein, total 7.3 6.4 - 8.2 g/dL Albumin 3.5 3.5 - 5.0 g/dL Globulin 3.8 2.0 - 4.0 g/dL A-G Ratio 0.9 (L) 1.1 - 2.2    
TROPONIN I  Collection Time: 04/21/21  6:31 PM  
Result Value Ref Range Troponin-I, Qt. 0.16 (H) <0.05 ng/mL MAGNESIUM Collection Time: 04/21/21  6:31 PM  
Result Value Ref Range Magnesium 2.4 1.6 - 2.4 mg/dL TSH 3RD GENERATION Collection Time: 04/21/21  6:31 PM  
Result Value Ref Range TSH 3.01 0.36 - 3.74 uIU/mL SAMPLES BEING HELD Collection Time: 04/21/21  6:31 PM  
Result Value Ref Range SAMPLES BEING HELD 1BLUE,1RED COMMENT Add-on orders for these samples will be processed based on acceptable specimen integrity and analyte stability, which may vary by analyte. PTT Collection Time: 04/21/21  6:31 PM  
Result Value Ref Range aPTT 27.5 22.1 - 31.0 sec  
 aPTT, therapeutic range     58.0 - 77.0 SECS  
PROTHROMBIN TIME + INR Collection Time: 04/21/21  6:31 PM  
Result Value Ref Range INR 1.0 0.9 - 1.1 Prothrombin time 10.8 9.0 - 11.1 sec POC TROPONIN-I Collection Time: 04/21/21  8:50 PM  
Result Value Ref Range Troponin-I (POC) 0.21 (H) 0.00 - 0.08 ng/mL BLOOD GAS, ARTERIAL Collection Time: 04/22/21 12:01 AM  
Result Value Ref Range pH 7.20 (LL) 7.35 - 7.45    
 PCO2 46 (H) 35 - 45 mmHg PO2 149 (H) 80 - 100 mmHg O2 SAT 98 (H) 92 - 97 % BICARBONATE 17 (L) 22 - 26 mmol/L  
 BASE DEFICIT 10.5 mmol/L  
 O2 METHOD VENT    
 FIO2 100 % MODE ASSIST CONTROL Tidal volume 400.0 SET RATE 14 PEEP/CPAP 8.0 Sample source ARTERIAL    
 SITE LEFT RADIAL ANITA'S TEST YES Critical value read back Called to Brandon Daly RN on 04/22/2021 at 00:07 TROPONIN I Collection Time: 04/22/21  1:00 AM  
Result Value Ref Range Troponin-I, Qt. 0.36 (H) <0.05 ng/mL IMPRESSION.   Acute respiratory failure probably due to a combination of primary intrinsic lung disease, pneumonia versus acute cardiac event.  Acute on chronic kidney injury.  Symptomatic bradycardia, requiring a pacemaker. PLAN.   
 Intravenous fluids in accordance with vascular assessment for hypotension and perfusion. Ongoing assessment of volume status.  Need to assess cardiac function and cardiac injury.  Pressors- Levophed. Vasopressin. Central venous access  Mechanical ventilation in the assist control mode. Increase the rate and volume. Optimize oxygenation and ventilation/perfusion.  Antibiotic coverage directed to   community acquired pneumonia.  Venous prophylaxis.  Gastrointestinal prophylaxis.  Insulin regimen for glucose control may be needed. Marisel Rodríguez. Kiesha Alvarenga MD,  [facility], FAASM Pulmonary Medicine · Sleep Medicine · Critical Care Originating site:      hospital listed above Presenter:    ICU nurse for the shift, respiratory therapist 
I performed all aspects of the physical examination via Telemedicine associated with two way audio and video communication and with the on-site assistance of  the presenter . I am located in [place], [place] and the patient is located in the facility noted. The patient is critically ill in the ICU. I personally  reviewed the pertinent medical records, laboratory/ pathology data and radiographic images. The decision making regarding this patient is as documented above, which was generated  following  discussion  with the multidisciplinary ICU team and creation of a treatment plan for  the patient. We discussed the patient's interval history and future coordination of care and  plans. The patient's medications  were reviewed and changes made as stipulated above. Due to  critical illness impairing one or more vital organs of this patient resulting in life threatening clinical situation  I have provided direct, frequent personal  assessment and manipulation in management plan. 60 minutes total critical care time; greater than 50% of the time was spent in direct patient care and coordination of care.

## 2021-04-22 NOTE — PROGRESS NOTES
Bedside and Verbal shift change report given to Marlenylm Barkley (oncoming nurse) by Cristina Anton RN and Grady HENRIQUEZ (offgoing nurse). Report included the following information SBAR, Kardex, Procedure Summary, Intake/Output, MAR, Recent Results and Cardiac Rhythm Paced. Shift Summary: 
 
22:15 - Dr. Noble Newsome patient temp remains elevated despite tylenol. Increased thick red/tan sputum noted. Increased agitation and discomfort noted. New orders received. Patient propofol increased overnight for restlessness. Patient able to nod appropriately and follow commands while restless prior to increasing sedation. Pain scores noted to improve with PRN fentanyl. See MAR. See above mention for fever. No further secretions noted when trying to obtain sputum culture through remainder of shift. Tylenol given x 1. See MAR. Ice pack applied when Tmax 102. 1. See flowsheets. Patient temp this 98-99. Patient resting quietly at 0645.

## 2021-04-22 NOTE — PROGRESS NOTES
Spiritual Care Assessment/Progress Note [address] [place] [address] Dr 
 
 
NAME: Tanya Magana      MRN: [id number] AGE: 80 y.o. SEX: male Evangelical Affiliation: [facility]  
Language: Kosta Thompson 4/21/2021     Total Time (in minutes): 18 Spiritual Assessment begun in [facility] EMERGENCY DEPT through conversation with: 
  
    []Patient        [x] Family    [] Friend(s) Reason for Consult: Initial/Spiritual assessment, patient floor Spiritual beliefs: (Please include comment if needed) [x] Identifies with a rajinder tradition:     
   [] Supported by a rajinder community:        
   [] Claims no spiritual orientation:       
   [] Seeking spiritual identity:            
   [] Adheres to an individual form of spirituality:       
   [] Not able to assess:                   
 
    
Identified resources for coping:  
   [x] Prayer                           
   [] Music                  [] Guided Imagery [x] Family/friends                 [] Pet visits [] Devotional reading                         [] Unknown 
   [] Other:                                       
 
 
Interventions offered during this visit: (See comments for more details) Family/Friend(s): Catharsis/review of pertinent events in supportive environment, Coping skills reviewed/reinforced, Affirmation of rajinder Plan of Care: 
 
 [] Support spiritual and/or cultural needs  
 [] Support AMD and/or advance care planning process [x] Support grieving process 
 [] Coordinate Rites and/or Rituals  
 [] Coordination with community clergy [] No spiritual needs identified at this time 
 [] Detailed Plan of Care below (See Comments)  [] Make referral to Music Therapy 
[] Make referral to Pet Therapy    
[] Make referral to Addiction services 
[] Make referral to [facility] 
[] Make referral to Spiritual Care Partner 
[] No future visits requested       
[x] Follow up upon further referrals Comments:  responded to a request to visit with 's daughter, Elizabeth Brar, as her father was having to have an emergency procedure. When  arrived to the ER, she was notified by the nursing staff that Mr. Loyd Da Silva daughter was in the ER consultation room.  introduced herself and extended support. Elizabeth Brar was very tearful as she spoke about the events that lead to her father's hospitalization. He had never had problems like this in the past. Elizabeth Brar shared her feelings and concerns and continued to engaged in story telling and review. She is his only child. Her  is on his way, but he is driving from [place] to be here. She expects him back in [place] by midnight tonight.  continued to provide emotional support as Elizabeth Brar continued to engage in storytelling and reflection. Elizabeth Brar shared that her rajinder is very important to her and that she is trusting God in this process.  inquired if prayer would be helpful and Elizabeth Brar shared that it would be.  provided prayer per her request. Elizabeth Brar thanked the  for her visit and is aware of the 's availability. 's are available for further support upon referral 
Princess Jane. [place].  Paging Service: [phone])

## 2021-04-22 NOTE — PROGRESS NOTES
Shift Summary: 
0000: Patient admitted from cath lab. Patient noted to respond to pain and with physical stimulation. Patient withdraws from pain. Patient arrived from cath lab on bed and placed on bedside monitor. Patient arrived on propofol, levo, and KVO of NS. See MAR. Patient arrived intubated and on vent. Patient has temporary venous pacer and cordis in R groin. See flowsheets for settings. 0030: RT completed ABG. Patient daughter Jared Boudreaux at bedside and updated to patient status, educated on unit and policies. Patient daughter verbalized understanding. Patient daughter remains at bedside at this time holding patient hand for comfort. Dr. Rena Seymour notified by charge nurse of patient admission. Dr. Rena Seymour to return call on video when available. 0114: Dr. Rena Seymour on teledoc video to see patient. Also, spoke with patient daughter at bedside. Dr. Rena Seymour updated to patient status, distended abdomen, patient able to follow some simple commands but becomes restless easily. See MAR for increased propofol. Dr. Rena Seymour aware. Dr. Rena Seymour ordered vent changes r/t ABG. See RT flowsheets. RT at bedside and is aware. Dr. Rena Seymour confirmed with RN that levophed running through cordis at this time. New order for OGT placement and LIWS. See flowsheet. 0200: Patient attempting to grasp and pull lines despite sedation. Dr. Rena Seymour notified. Bilateral soft restraints ordered, and placed. Patient tolerating well. Patient daughter at bedside and educated on restraints for safety to prevent injury and dislodgement of critical medical devices. Patient daughter verbalized understanding. Patient daughter reports she is leaving at this time and will return tomorrow. 0425: Dr. Tash Dixon notified of patient increase in temp since admission. New orders received for cultures. Dr. Rena Seymour ordered antibiotics after speaking with Dr. Tash Dixon. 9377: Patient resting at this time.  See MAR for tylenol for fever. Patient remains in bilateral wrist restraints for safety and tolerating well.

## 2021-04-22 NOTE — PROGRESS NOTES
visit for routine follow up. Patient resting in bed, asleep, intubated, appears comfortable. Patient's daughter, Janey Lee, at bedside. Provided spiritual presence and listening as she spoke of her present thoughts, feelings, and concerns. Spoke of her father's health, admission, and events which have taken place since last evening. Says she is feeling much better today and expressed gratitude for the care provided. Says her  did return from [place] last night and is at home as he is not able to visit due to one visitor restriction due to pandemic. Hopeful for her father's recovery. Informed of availability of  and pastoral care. Appeared comforted and encouraged as a result of this visit and expressed gratitude for this visit. Rev. Jaison Kunz MDiv, [facility], [facility]  paging service: [phone])

## 2021-04-22 NOTE — H&P
[facility] [address] [facility], [facility] 19 
[phone] Hospitalist Admission Note NAME: Neo Quinteros :  1931 MRN:  [id number] Date/Time:  2021 9:09 PM 
 
Patient PCP: Alisa Singh MD 
 
Emergency Contact:   
Extended Emergency Contact Information Primary Emergency Contact: Leeann Narvaez [address] Home Phone: [phone] Mobile Phone: [phone] Relation: Daughter Secondary Emergency Contact: Leeann Narvaez Home Phone: [phone] Relation: Child Code: Full Code Isolation Precautions: There are currently no Active Isolations Subjective: CHIEF COMPLAINT: Complete heart block and cardiogenic failure HISTORY OF PRESENT ILLNESS:    
Mr. Vee Sommer is a 80 y.o. male with history that includes HTN, CKD stage IV, HLD, and hypothyroidism presents from [facility] with reports that he has not been feeling well for the past couple of days and has been hypotensive. He was feeling worse today and was brought to the ER where he was found to be bradycardic with third-degree AVB. In the ER he was given atropine which did not help and was started on transcutaneous pacing with Versed. Initially seemed to have converted and was doing better but subsequently went into brief V. fib and asystole. He again went to V. fib requiring CPR and cardioversion. He has been on pressors which by the time I arrived to the ER he was already intubated and blood pressures remained systolically in the low 16O with a heart rate of 32. I initiated epinephrine and cardiology Dr. Liana Carroll presented at bedside and took the patient to the cath for pacemaker. Given that the patient was sedated on the vent and unresponsive was not able to obtain history nor review of system. History obtained from medical chart, ER physician, nursing, paperwork from [facility], and discussed with cardiology.  
 
No Known Allergies Prior to Admission medications Medication Sig Start Date End Date Taking? Authorizing Provider  
magnesium hydroxide (Dior Milk of Magnesia) 400 mg/5 mL suspension Take 30 mL by mouth daily as needed for Constipation. Yes Provider, Historical  
pantoprazole (PROTONIX) 40 mg tablet Take 40 mg by mouth daily. Yes Provider, Historical  
metoprolol tartrate (LOPRESSOR) 25 mg tablet Take 25 mg by mouth two (2) times a day. Yes Provider, Historical  
diclofenac (Voltaren) 1 % gel Apply 1 g to affected area nightly. Yes Provider, Historical  
levothyroxine (SYNTHROID) 50 mcg tablet Take 50 mcg by mouth Daily (before breakfast). Yes Provider, Historical  
tetrahydrozoline-zinc (Visine-AC) 0.05-0.25 % ophthalmic solution Administer 2 Drops to both eyes four (4) times daily as needed. Yes Provider, Historical  
gabapentin (NEURONTIN) 300 mg capsule Take 300 mg by mouth two (2) times a day. Yes Provider, Historical  
aspirin delayed-release 81 mg tablet Take 81 mg by mouth daily. Yes Provider, Historical  
acetaminophen (TYLENOL) 500 mg tablet Take 1,000 mg by mouth two (2) times daily as needed for Pain. Yes Provider, Historical  
clopidogrel (PLAVIX) 75 mg tab Take 75 mg by mouth daily. Yes Provider, Historical  
amLODIPine (NORVASC) 5 mg tablet Take 5 mg by mouth every morning. Yes Provider, Historical  
losartan (COZAAR) 100 mg tablet Take 1 Tab by mouth daily. 2/27/14  Yes Richie Sanchez MD  
 
 
Past Medical History:  
Diagnosis Date  BPH (benign prostatic hyperplasia)  CKD (chronic kidney disease) 5/28/15 pt denies kidney disease  DDD (degenerative disc disease), lumbar  Elevated PSA  Endocrine disease Chronic kidney disease  Heart murmur Dr Leeann Cabral  Hoarse   
 x6 months as of 5/28/15; being evaluated by Dr Gary Rubin  Hyperlipemia  Hyperlipidemia  Hypertension Dr Kinjal Marti  Hypothyroidism  Ill-defined condition   
 aortic stenosis  Ill-defined condition   
 benign prostatic hyperplasia  Ill-defined condition   
 heart murmur  Osteoarthritis, shoulder  Osteoporosis  Positive PPD, treated   
 treated with INH  Sensorineural hearing loss   
 as of 5/28/15 pt wears hearing aids  Sleep disorder   
 insomnia  Unspecified adverse effect of anesthesia 1960s  
 delayed awaking Past Surgical History:  
Procedure Laterality Date  HX CATARACT REMOVAL Bilateral 2004  HX COLONOSCOPY  2006 [address]  HX INTRAOCULAR LENS PROSTHESIS    
 bilateral  
 HX LUMBAR LAMINECTOMY   Cathy Martin  HX MOHS PROCEDURES  prior to   
 and bicep repair  RI TOTAL KNEE ARTHROPLASTY Right 14 Social History Tobacco Use  Smoking status: Former Smoker Packs/day: 1.00 Years: 26.00 Pack years: 26.00 Types: Cigarettes Quit date: 1976 Years since quittin.3  Smokeless tobacco: Never Used Substance Use Topics  Alcohol use: Yes Alcohol/week: 1.0 standard drinks Types: 1 Shots of liquor per week Comment: 4-5x week burbon in afternoon Family History Problem Relation Age of Onset  Cancer Father   
     lung  Stroke Mother  Diabetes Mother  Diabetes Sister  Cancer Sister [address] and medications were reviewed. Review of Systems (14 point ROS): 
Unable to obtain at this time given patient's current edition as described in HPI Objective:   
 
Visit Vitals BP (!) 102/58 Pulse 99 Temp 98.3 °F (36.8 °C) Resp 28 Ht 5' 7\" (1.702 m) Wt 90.7 kg (200 lb) SpO2 96% BMI 31.32 kg/m² Exam:  
 
Physical Exam: 
 
General: Unresponsive on vent. Head: Normocephalic, atraumatic Eyes: Pupils dilated bilaterally on pressors. ENT: Lips, mucosa, and tongue normal.  
Neck: supple, no tenderness Lungs: Clear to auscultation and appears stable on the vent.  
Heart: Bradycardic Abd: SNTBS(+) Ext: no cyanosis, bilateral 1+ LE pitting edema Pulses: Unable to palpate likely due to bradycardia. Skin: Skin color, texture, turgor normal. No rashes or lesions Neuro: Unable to assess with the patient sedated. Psych: Unable to assess at this time due to sedation. Labs: 
 
Recent Labs  
  04/21/21 1831 WBC 8.1 HGB 11.3* HCT 35.1*  
 Recent Labs  
  04/21/21 1831   
K 4.7  CO2 22 * BUN 45* CREA 2.16* CA 8.7 MG 2.4 ALB 3.5 TBILI 0.5 ALT 42 Lab Results Component Value Date/Time Glucose (POC) 116 (H) 08/05/2014 08:59 PM  
 
No results for input(s): PH, PCO2, PO2, HCO3, FIO2 in the last 72 hours. Recent Labs  
  04/21/21 1831 INR 1.0 Radiology and EKG reviewed:    
 
[facility] Result Date: 4/21/2021 Low lung volumes with bilateral lower lobe atelectasis. ET tube satisfactory position. I personally reviewed and interpreted the imaging studies and agree with official reading. **Chart reviewed in [facility]** Assessment/Plan:   
 
Cardiogenic shock ([address] (4/21/2021) / Asystole ([address] (4/21/2021) / Ventricular fibrillation ([address] (4/21/2021) / Third degree heart block ([address] (4/21/2021) / Bradycardia (4/21/2021):   Patient will be admitted to the ICU for critical care management of this very ill patient to avoid further decompensation. Currently going with cardiology and cath team for what I assume will be transcutaneous pacemaker. We will continue with pressors. As mentioned initiated epinephrine. Give IV fluids and await further input from cardiology. Elevated troponin (4/21/2021): Unsure of cause at this point maybe NSTEMI but expect the troponin to go higher due to CPR and defibrillation. BARAK may also play a role. Trend troponins. Cardiology involved. BARAK (acute kidney injury) ([address] (4/21/2021): Likely related to hypotension. Avoid nephrotoxic agents. IV fluids. Monitor labs. Expect worsening due to hypotension/cardiogenic shock. Hypoxia (4/21/2021): Related to the cardiac issues. Currently on vent. Hypertension (8/1/2013): Currently hypotensive no need for medications at this time. Will definitely avoid calcium and beta-blockers unless cardiology says otherwise. BPH (benign prostatic hyperplasia) (8/1/2013): Stephens catheter. Body mass index is 31.32 kg/m².: 30.0 - 39.9 Obese Risk of deterioration: high Total critical care time 60 minutes in direct patient care with this critically ill patient in which management of pressors along with discussion the appropriate medical care team and daughter at bedside. Discussed:  Code Status and Care Plan discussed with:  []Patient  [x]Family  []Care Giver  [x]ED Doc  [x]RN  []Specialist  []Care Manager Prophylaxis:  H2B/PPI and SCD Probable disposition: To be determined by hospital course. Date of service:    4/21/2021  
 
 
        
___________________________________________________ Admitting Physician: Ney Case MD

## 2021-04-22 NOTE — ED NOTES
Compressions started after administration of amiodarone as pt was found to have no pulse. Compressions stopped at 2152 as patient had ROSC

## 2021-04-22 NOTE — ACP (ADVANCE CARE PLANNING)
Advance Care Planning Advance Care Planning (ACP) Physician/NP/PA Conversation Date of Conversation: 2021 Conducted with: Daughter Healthcare Decision Maker: Daughter Care Preferences: 
 
Ventilation: \"If you were unable to breathe on your own and your chance of recovery was unlikely, what would be your preference about the use of a ventilator (breathing machine) if it was available to you? \" The patient would desire the use of a ventilator. Resuscitation: \"In the event your heart stopped as a result of an underlying serious health condition, would you want attempts to be made to restart your heart, or would you prefer a natural death? \" Yes, attempt to resuscitate. Additional topics discussed: treatment goals Conversation Outcomes / Follow-Up Plan:  
Spoke with daughter at bedside. She states that she is his only child. Patient spouse is . Discussed cardiopulmonary arrest with daughter, consideration of pacemaker and respiratory failure. She states that patient would likely not want long-term intubation/trach placement. Discussed whether she would want him to receive CPR again; states that this time she does wish for her dad to remain a full code. Length of Voluntary ACP Conversation in minutes:  16 minutes Ally Porter DO

## 2021-04-23 NOTE — PROGRESS NOTES
Critical Care Progress Note Date:2021       Room:[address] [place] [address] Patient Maged Luciano     YOB: 1931     Age:89 y.o. Subjective Subjective:  
: Sedated on propofol. CT head:no acute process. On low dose of Levophed. BNP 8800. Pacemaker in place. On VCV: 20/500/60/8. Cr down from 2.17 to 1.72 UO: 0.7L NPO Hb down from 10.4 to 9.9 Plts stable 191 T max: 102.1 ( @ 21:00), T :100.2 this morning. WBCs normal. I/O=+1.5L Review of Systems: Unable to obtain given sedation Objective Vitals Last 24 Hours: TEMPERATURE:  Temp  Av.4 °F (38 °C)  Min: 98.7 °F (37.1 °C)  Max: 102.1 °F (38.9 °C) RESPIRATIONS RANGE: Resp  Av  Min: 14  Max: 40 PULSE OXIMETRY RANGE: SpO2  Av.5 %  Min: 96 %  Max: 100 % PULSE RANGE: Pulse  Av.1  Min: 60  Max: 62 
BLOOD PRESSURE RANGE: Systolic (01MSU), SET:167 , Min:73 , DXE:082  
; Diastolic (15KGI), UPN:22, Min:39, Max:113 I/O (24Hr): Intake/Output Summary (Last 24 hours) at 2021 1258 Last data filed at 2021 1200 Gross per 24 hour Intake 2380.21 ml Output 860 ml Net 1520.21 ml Physical Exam: 
 
 
I examined the patient via telemedicine, with its associated limitations. I reviewed the electronic medical record, the x-rays, labs, progress notes, previous history and physicals and consultation notes that were available in the electronic medical record. I beamed in and examined the patient with assistance of house staff On exam: 
 
Sedated Intubated S1,S2 Decreased breath sounds bilateral at bases Abdomen soft Introducer in groin Objective Labs/Imaging/Diagnostics Labs: CBC: 
Recent Labs  
  21 
0337 21 
5992 21 
1831 WBC 7.9 8.1 8.1  
RBC 3.35* 3.53* 3.83* HGB 9.9* 10.4* 11.3* HCT 30.8* 33.2* 35.1*  
MCV 91.9 94.1 91.6 RDW 13.9 13.7 13.9  197 169 CHEMISTRIES: 
Recent Labs  
  21 
0337 21 
7837 21 
1831  135* 136  
K 4.2 4.6 4.7  105 106 CO2 22 23 22 BUN 45* 48* 45*  
CA 9.0 8.4* 8.7 PHOS  --  3.2  --   
MG 2.4 2.4 2.4 PT/INR: 
Recent Labs  
  04/22/21 
6340 04/21/21 1831 INR 1.1 1.0 APTT: 
Recent Labs  
  04/21/21 1831 APTT 27.5 LIVER PROFILE: 
Recent Labs  
  04/22/21 
7818 04/21/21 1831 AST 46* 39* ALT 48 42 Lab Results Component Value Date/Time ALT (SGPT) 48 04/22/2021 06:33 AM  
 AST (SGOT) 46 (H) 04/22/2021 06:33 AM  
 Alk. phosphatase 114 04/22/2021 06:33 AM  
 Bilirubin, total 0.4 04/22/2021 06:33 AM  
 
 
Imaging Last 24 Hours: 
No results found. Assessment//Plan Active Problems: Hypertension (8/1/2013) Hyperlipidemia (8/1/2013) BPH (benign prostatic hyperplasia) (8/1/2013) Hypoxia (4/21/2021) Third degree heart block ([address] (4/21/2021) Bradycardia (4/21/2021) Elevated troponin (4/21/2021) BARAK (acute kidney injury) ([address] (4/21/2021) Cardiogenic shock ([address] (4/21/2021) Asystole ([address] (4/21/2021) Ventricular fibrillation ([address] (4/21/2021) Assessment & Plan Assessment:  
Sp Cardiac Arrest with ROSC Acute Resp Failure CHB sp temporary Pacemaker BARAK Shock,circulatory Bacteremia: GPC in chains Plan: 
Wean Propofol as tolerated to RASS goal.Fentyanyl PRN Pacemaker in place. Cardiology following. Wean Levophed as tolerated. Check Lactic acid. Defer timing of pacemaker and decision for RAMAN to cardiology given bacteremia Vent support. Decrease TV to 400. Wean O2 to 40% to keep SAT>92%,CONTINUE peep OF 8. Follow ABG and CXR. Monitor UO and renal indices. Will need diuresis once off pressors. Start TF. Bowel regimen Monitor H/H and Plts Increase ceftriaxone dose to 2 gm daily. Follow BCx results. Repeat BCx on 04/24. Consult ID for PPM placement clearance. May need RAMAN. Monitor I/O. Continue LR infusion Poor prognosis I performed all aspects of the physical examination via Telemedicine associated with two way audio and video communication and with the on-site assistance of  the bedside nurse. I am located in [place] and the patient is located in [place] at [address] [place] [address]. The patient is critically ill in the ICU. I  personally  reviewed the pertinent medical records, laboratory/ pathology data and radiographic images. The decision making regarding this patient is as documented above, which was generated  following  discussion  with the multidisciplinary ICU team and creation of a treatment plan for  the patient. We discussed the patient's interval history and future coordination of care and  plans. The patient's medications  were reviewed and changes made as stipulated above. Due to  critical illness impairing one or more vital organs of this patient resulting in life threatening clinical situation  I have provided direct, frequent personal  assessment and manipulation in management plan and spent 45 minutes  of  critical care time excluding the time spent on procedures and teaching. Greater than 50% of this time  in patients care was  employed  in counseling and coordination of care and engaged in face to face discussion of case management issues, addressing questions, and outlining a plan of  therapy. Pt at risk of life threatening deterioration from circulatory shock. Pt seen and evaluated via tele encounter. Audio and Video were used for this interaction.  
 
 
Electronically signed by Iliana Linton MD on 4/23/2021 at 12:58 PM

## 2021-04-23 NOTE — PROGRESS NOTES
Transition of care note: 
 
RUR 16% Plan: 
 
[place] [phone] and [phone] This is pt.'s daughter. Pt remains intubated and sedated. ID physician consulted. Once antibiotics have been completed,the plan is for dual chamber pacemaker placement. As pt currently resides in assisted living @ [place] [address]. will likely benefit from short-term rehab @ [address] [place] [address]. I met  with pt.'s daughter and  With daughter's consent,I have sent clinicals to [address] [place] [address] through [facility]. Pt was previously in rehab @ [facility] and knows everyone there and their families.  
 
Kamini Ellsworth 
CM

## 2021-04-23 NOTE — PROGRESS NOTES
Problem: Non-Violent Restraints Goal: Removal from restraints as soon as assessed to be safe Outcome: Progressing Towards Goal 
Goal: No harm/injury to patient while restraints in use Outcome: Progressing Towards Goal 
Goal: Patient's dignity will be maintained Outcome: Progressing Towards Goal 
Goal: Patient Interventions Outcome: Progressing Towards Goal 
  
Problem: Ventilator Management Goal: *Adequate oxygenation and ventilation Outcome: Progressing Towards Goal 
Goal: *Patient maintains clear airway/free of aspiration Outcome: Progressing Towards Goal 
Goal: *Absence of infection signs and symptoms Outcome: Progressing Towards Goal 
Goal: *Normal spontaneous ventilation Outcome: Progressing Towards Goal 
  
Problem: Patient Education: Go to Patient Education Activity Goal: Patient/Family Education Outcome: Progressing Towards Goal 
  
Problem: Falls - Risk of 
Goal: *Absence of Falls Description: Document Anna Couch Fall Risk and appropriate interventions in the flowsheet. Outcome: Progressing Towards Goal 
Note: Fall Risk Interventions: 
  
 
Mentation Interventions: Adequate sleep, hydration, pain control, Bed/chair exit alarm, Evaluate medications/consider consulting pharmacy, Door open when patient unattended, Increase mobility, More frequent rounding, Reorient patient, Room close to nurse's station, Toileting rounds, Update white board Medication Interventions: Bed/chair exit alarm, Assess postural VS orthostatic hypotension, Evaluate medications/consider consulting pharmacy Elimination Interventions: Bed/chair exit alarm, Call light in reach, Stay With Me (per policy) History of Falls Interventions: Bed/chair exit alarm, Room close to nurse's station, Door open when patient unattended Problem: Patient Education: Go to Patient Education Activity Goal: Patient/Family Education Outcome: Progressing Towards Goal 
  
Problem: Pressure Injury - Risk of 
Goal: *Prevention of pressure injury Description: Document Fabio Scale and appropriate interventions in the flowsheet. Outcome: Progressing Towards Goal 
Note: Pressure Injury Interventions: 
Sensory Interventions: Assess changes in LOC, Assess need for specialty bed, Check visual cues for pain, Float heels, Keep linens dry and wrinkle-free, Maintain/enhance activity level, Minimize linen layers, Monitor skin under medical devices, Pad between skin to skin, Pressure redistribution bed/mattress (bed type), Turn and reposition approx. every two hours (pillows and wedges if needed) Moisture Interventions: Absorbent underpads, Apply protective barrier, creams and emollients, Check for incontinence Q2 hours and as needed, Assess need for specialty bed, Internal/External urinary devices, Minimize layers, Moisture barrier Activity Interventions: Assess need for specialty bed, Increase time out of bed, Pressure redistribution bed/mattress(bed type), PT/OT evaluation Mobility Interventions: Assess need for specialty bed, Float heels, Pressure redistribution bed/mattress (bed type), PT/OT evaluation, Turn and reposition approx. every two hours(pillow and wedges) Nutrition Interventions: Document food/fluid/supplement intake, Discuss nutritional consult with provider Friction and Shear Interventions: Apply protective barrier, creams and emollients, Lift team/patient mobility team, Transferring/repositioning devices, Minimize layers Problem: Patient Education: Go to Patient Education Activity Goal: Patient/Family Education Outcome: Progressing Towards Goal

## 2021-04-23 NOTE — CONSULTS
Comprehensive Nutrition Assessment Type and Reason for Visit: Initial, Consult Nutrition Recommendations/Plan:  
Begin Vital HP @20mL, increase to goal rate of 50mL/hr continuous, water flush of 50mL q8H 
· Keep HOB >30 degrees TF at goal: 1200mL of tf, 1200 kcal (~1500kcal including Propofol), 105g Protein, 1200mL of free water from tf & flushes [16.5 kcal/kg & 1.15g Pro/kg] Nutrition Assessment:    
323: 80year old male admitted for Third degree heart block ([address] [I44.2], Bradycardia [R00.1], Elevated troponin [R77.8] and BARAK (acute kidney injury) ([address] [N17.9]. Pt is intubated following cardiac surgery. RD visits room, pt is alert with daughter at bedside. Pt responded to disagree with his estimated weight of 200# on admission. Review of wt hx shows his usual was indeed below 200# a few years ago. Malnutrition Assessment: 
Malnutrition Status:  No malnutrition Nutrition focused physical exam finds: large abdomen, semi-soft, dry skin, appears stated age, mild generalized edema, no significant signs of malnutrition. Daughter at bedside states, he very much likes to eat & has always had a great appetite. Estimated Daily Nutrient Needs: 
Energy (kcal): 1970 kcal (RMR PennStat ); Weight Used for Energy Requirements: Admission(90.7 kg) Protein (g): 100 - 120g (1.5-1.8g/kg of IBW); Weight Used for Protein Requirements: Ideal(67.3 kg) Fluid (ml/day): 1970mL; Method Used for Fluid Requirements: 1 ml/kcal 
 
Nutrition Related Findings:      
Last BM: PTA Edema: 2+ LLE & RLE Vent Measures: 10.3 l/min Temp (24hrs), Av.3 °F (37.9 °C), Min:98.7 °F (37.1 °C), Max:102.1 °F (38.9 °C) Wounds:  Small surgical  
Several bruises from IV, no skin breakdown noted Current Nutrition Therapies: DIET NPO Anthropometric Measures: 
· Height:  5' 7\" (170.2 cm) · Current Body Wt:  90.7 kg (199 lb 15.3 oz) · Admission Body Wt:      
· Usual Body Wt:  86.2 kg (190 lb) · Ideal Body Wt:  148 lbs:  135.1 % · Body mass index is 31.32 kg/m². · BMI Category:  Obese class 1 (BMI 30.0-34. 9) Wt Readings from Last 10 Encounters:  
04/22/21 90.7 kg (199 lb 15.3 oz) 07/10/19 86.2 kg (190 lb)  
11/26/18 83.5 kg (184 lb)  
11/20/18 84.8 kg (187 lb) 08/03/18 84.8 kg (187 lb) 08/01/18 85.7 kg (189 lb) 05/14/18 83.9 kg (185 lb) 05/11/18 83.9 kg (185 lb) 05/08/18 83.9 kg (185 lb) 05/03/18 84.8 kg (187 lb) Nutrition Diagnosis:  
· Inadequate protein-energy intake related to catabolic illness, cardiac dysfunction as evidenced by NPO or clear liquid status due to medical condition, intubation Nutrition Interventions:  
Food and/or Nutrient Delivery: Start tube feeding Nutrition Education and Counseling: Education initiated Coordination of Nutrition Care: Continue to monitor while inpatient, Interdisciplinary rounds Goals: Tolerance of 80% of RMR via EN over the next 3-4 days Nutrition Monitoring and Evaluation:  
Behavioral-Environmental Outcomes:   
Food/Nutrient Intake Outcomes: Enteral nutrition intake/tolerance, IVF intake Physical Signs/Symptoms Outcomes: Biochemical data, GI status, Weight Discharge Planning: Too soon to determine Electronically signed by Tiarra Juan RD, MS on 4/23/2021 at 2:36 PM 
Contact via [facility] or office [phone]

## 2021-04-23 NOTE — PROGRESS NOTES
Bedside and Verbal shift change report given to Jeannine Conner RN/Britany RN (oncoming nurse) by Vern Parisi RN (offgoing nurse). Report included the following information SBAR, Kardex, ED Summary, Procedure Summary, Intake/Output, MAR, Recent Results, Med Rec Status, Cardiac Rhythm Paced and Alarm Parameters .

## 2021-04-23 NOTE — PROGRESS NOTES
Bedside shift change report given to [address] (oncoming nurse) by Gera De La Cruz RN (offgoing nurse). Report included the following information SBAR, Intake/Output, Recent Results, Cardiac Rhythm Paced, Quality Measures and Dual Neuro Assessment. Primary Nurse Dandre Walker, MARTINEZ and MARTINEZ Muñoz performed a dual skin assessment on this patient No impairment noted 0800- Assessment completed- see flowsheet. Medications administered, mouth care and gail care completed. Patient suctioned with scant secretions present. 0900- Patient's daughter Sol Armando arrived to bedside, updated on patient's condition and plans for the day.  in to see patient- per MD wean propofol some and see if patient can tolerate being off levophed. 0930-  back to bedside to speak with patient's daughter Jose M Maldonado. 1000- Patient turned and repositioned, mouth care completed and patient suctioned with no secretions present. 1100- IDRs completed, notified  that blood cultures were positive. 1200- Cardiology MD in room to talk to patient's daughter and notified her that EP was unable to place the pacemaker with an active infection. Pt daughter notified of plan moving forward to treat with antibiotics and repeat blood cultures tomorrow. Medications administered, patient turned and repositioned and suctioned with no secretions present via ETT but moderate clear secretions suctioned orally. Reassessment completed- see flowsheet. 1400- Patient turned and repositioned, mouth care completed and patient suctioned with minimal clear secretions present. Medications administered. 1600- Reassessment completed- see flowsheet. Patient turned and repositioned, mouth care completed and patient suctioned. Complete CHG bath done. Medications administered. Vital HP started @20 via OG tube. 1800- Patient turned and repositioned, mouth care completed and patient suctioned. Medications administered.   
1900- Bedside shift change report given to Anders Arguelles RN (oncoming nurse) by Tavo Michelle (offgoing nurse). Report included the following information SBAR, Recent Results, Cardiac Rhythm Paced, Quality Measures and Dual Neuro Assessment.

## 2021-04-23 NOTE — H&P
SUBJECTIVE Darlene Nichole is a 80 y.o. male presenting to ER with symptomatic complete heart block requiring temporary TC 
 pacing and ultimately underwent placement of right femoral TVP. He is intubated and on propofol. He is on levophed currently. Tmax 101 last night; no recurrent fever; WBC normal. TSH normal. Head CT unremarkable. BCx NGTD. Not on rate controlling medications. ?VF arrest observed in ER. Echo demonstrated preserved LV function. ACTIVE PROBLEM LIST Patient Active Problem List  
 Diagnosis Date Noted  Hypoxia 04/21/2021  Third degree heart block ([address] 04/21/2021  Bradycardia 04/21/2021  Elevated troponin 04/21/2021  BARAK (acute kidney injury) ([address] 04/21/2021  Cardiogenic shock ([address] 04/21/2021  Asystole ([address] 04/21/2021  Ventricular fibrillation ([address] 04/21/2021  Acute bronchitis 11/26/2018  AVILA (dyspnea on exertion) 11/26/2018  Mild asthma 11/03/2017  Presence of stent in coronary artery in patient with coronary artery disease 11/02/2017  History of throat cancer 06/24/2016  ACP (advance care planning) 12/18/2015  Right knee DJD 08/04/2014  Aortic stenosis 01/03/2014  Hypertension 08/01/2013  Hyperlipidemia 08/01/2013  DDD (degenerative disc disease), lumbar 08/01/2013  BPH (benign prostatic hyperplasia) 08/01/2013 MEDICATIONS Current Facility-Administered Medications Medication Dose Route Frequency  cefTRIAXone (ROCEPHIN) 1 g in sterile water (preservative free) 10 mL IV syringe  1 g IntraVENous Q24H  
 famotidine (PF) (PEPCID) 20 mg in 0.9% sodium chloride 10 mL injection  20 mg IntraVENous Q24H  
 lactated Ringers infusion  75 mL/hr IntraVENous CONTINUOUS  
 heparin (porcine) injection 5,000 Units  5,000 Units SubCUTAneous Q8H  
 fentaNYL citrate (PF) injection 50 mcg  50 mcg IntraVENous Q1H PRN  propofol (DIPRIVAN) 10 mg/mL infusion  0-50 mcg/kg/min IntraVENous TITRATE  sodium chloride (NS) flush 5-40 mL  5-40 mL IntraVENous Q8H  
 sodium chloride (NS) flush 5-40 mL  5-40 mL IntraVENous PRN  
 acetaminophen (TYLENOL) tablet 650 mg  650 mg Oral Q6H PRN Or  
 acetaminophen (TYLENOL) suppository 650 mg  650 mg Rectal Q6H PRN  polyethylene glycol (MIRALAX) packet 17 g  17 g Oral DAILY PRN  promethazine (PHENERGAN) tablet 12.5 mg  12.5 mg Oral Q6H PRN Or  
 ondansetron (ZOFRAN) injection 4 mg  4 mg IntraVENous Q6H PRN  
 NOREPINephrine (LEVOPHED) 8 mg in 5% dextrose 250mL (32 mcg/mL) infusion  0.5-16 mcg/min IntraVENous TITRATE  
  
 
 
PAST MEDICAL HISTORY Past Medical History:  
Diagnosis Date  BPH (benign prostatic hyperplasia)  CKD (chronic kidney disease) 5/28/15 pt denies kidney disease  DDD (degenerative disc disease), lumbar  Elevated PSA  Endocrine disease Chronic kidney disease  Heart murmur Dr Xiomy Greer  Hoarse   
 x6 months as of 5/28/15; being evaluated by Dr Colonel Grayson  Hyperlipemia  Hyperlipidemia  Hypertension Dr Jaya Gallo  Hypothyroidism  Ill-defined condition   
 aortic stenosis  Ill-defined condition   
 benign prostatic hyperplasia  Ill-defined condition   
 heart murmur  Osteoarthritis, shoulder  Osteoporosis  Positive PPD, treated 1990  
 treated with INH  Sensorineural hearing loss   
 as of 5/28/15 pt wears hearing aids  Sleep disorder   
 insomnia  Unspecified adverse effect of anesthesia 1960s  
 delayed awaking PAST SURGICAL HISTORY Past Surgical History:  
Procedure Laterality Date  HX CATARACT REMOVAL Bilateral 2004  HX COLONOSCOPY  2006 [address]  HX INTRAOCULAR LENS PROSTHESIS    
 bilateral  
 HX LUMBAR LAMINECTOMY  2006 Rosanna Gracia  HX MOHS PROCEDURES  prior to 2014  
 and bicep repair  DE TOTAL KNEE ARTHROPLASTY Right 8/4/14 ALLERGIES No Known Allergies FAMILY HISTORY Family History Problem Relation Age of Onset  Cancer Father   
     lung  Stroke Mother  Diabetes Mother  Diabetes Sister  Cancer Sister   
 negative for cardiac disease SOCIAL HISTORY Social History Socioeconomic History  Marital status:  Spouse name: Not on file  Number of children: 1  Years of education: Not on file  Highest education level: Not on file Occupational History  Occupation: retired Tobacco Use  Smoking status: Former Smoker Packs/day: 1.00 Years: 26.00 Pack years: 26.00 Types: Cigarettes Quit date: 1976 Years since quittin.3  Smokeless tobacco: Never Used Substance and Sexual Activity  Alcohol use: Yes Alcohol/week: 1.0 standard drinks Types: 1 Shots of liquor per week Comment: 4-5x week burbon in afternoon  Drug use: No  
 Sexual activity: Never Social History Narrative  lives on his own MEDICATIONS Current Facility-Administered Medications Medication Dose Route Frequency  cefTRIAXone (ROCEPHIN) 1 g in sterile water (preservative free) 10 mL IV syringe  1 g IntraVENous Q24H  
 famotidine (PF) (PEPCID) 20 mg in 0.9% sodium chloride 10 mL injection  20 mg IntraVENous Q24H  
 lactated Ringers infusion  75 mL/hr IntraVENous CONTINUOUS  
 heparin (porcine) injection 5,000 Units  5,000 Units SubCUTAneous Q8H  
 fentaNYL citrate (PF) injection 50 mcg  50 mcg IntraVENous Q1H PRN  propofol (DIPRIVAN) 10 mg/mL infusion  0-50 mcg/kg/min IntraVENous TITRATE  sodium chloride (NS) flush 5-40 mL  5-40 mL IntraVENous Q8H  
 sodium chloride (NS) flush 5-40 mL  5-40 mL IntraVENous PRN  
 acetaminophen (TYLENOL) tablet 650 mg  650 mg Oral Q6H PRN Or  
 acetaminophen (TYLENOL) suppository 650 mg  650 mg Rectal Q6H PRN  polyethylene glycol (MIRALAX) packet 17 g  17 g Oral DAILY PRN  promethazine (PHENERGAN) tablet 12.5 mg  12.5 mg Oral Q6H PRN  Or  ondansetron (ZOFRAN) injection 4 mg  4 mg IntraVENous Q6H PRN  
 NOREPINephrine (LEVOPHED) 8 mg in 5% dextrose 250mL (32 mcg/mL) infusion  0.5-16 mcg/min IntraVENous TITRATE I have reviewed the nurses notes, vitals, problem list, allergy list, medical history, family, social history and medications. REVIEW OF SYMPTOMS Intubated/sedated PHYSICAL EXAMINATION Vitals:  
 04/23/21 0815 04/23/21 0830 04/23/21 0845 04/23/21 0900 BP: (!) 134/55 (!) 150/58 (!) 142/54 126/64 Pulse: 60 60 60 60 Resp: 23 27 23 23 Temp:      
SpO2: 99% 99% 99% 97% Weight:      
Height:      
 
General: Intubated HEENT: No jaundice, oral mucosa moist, no oral ulcers Neck: Supple, no stiffness, no lymphadenopathy, supple Heart:  Normal S1/S2 negative S3 or S4. Regular, no murmur, gallop or rub, no jugular venous distention Respiratory: Clear bilaterally x 4, no wheezing or rales Abdomen:   Soft, non-tender, bowel sounds are active.  
Extremities:  No edema, normal cap refill, no cyanosis. Musculoskeletal: No clubbing, no deformities Neuro: A&Ox3, speech clear, gait stable, cooperative, no focal neurologic deficits Skin: Skin color is normal. No rashes or lesions. Non diaphoretic, moist. 
Vascular: 2+ pulses symmetric in all extremities DIAGNOSTIC DATA TELEMETRY:  
 
 
 LABORATORY DATA Lab Results Component Value Date/Time WBC 7.9 04/23/2021 03:37 AM  
 HGB 9.9 (L) 04/23/2021 03:37 AM  
 HCT 30.8 (L) 04/23/2021 03:37 AM  
 PLATELET 129 87/61/1681 03:37 AM  
 MCV 91.9 04/23/2021 03:37 AM  
  
Lab Results Component Value Date/Time  Sodium 137 04/23/2021 03:37 AM  
 Potassium 4.2 04/23/2021 03:37 AM  
 Chloride 107 04/23/2021 03:37 AM  
 CO2 22 04/23/2021 03:37 AM  
 Anion gap 8 04/23/2021 03:37 AM  
 Glucose 114 (H) 04/23/2021 03:37 AM  
 BUN 45 (H) 04/23/2021 03:37 AM  
 Creatinine 1.72 (H) 04/23/2021 03:37 AM  
 BUN/Creatinine ratio 26 (H) 04/23/2021 03:37 AM  
 GFR est AA 46 (L) 04/23/2021 03:37 AM  
 GFR est non-AA 38 (L) 04/23/2021 03:37 AM  
 Calcium 9.0 04/23/2021 03:37 AM  
 Bilirubin, total 0.4 04/22/2021 06:33 AM  
 Alk. phosphatase 114 04/22/2021 06:33 AM  
 Protein, total 6.6 04/22/2021 06:33 AM  
 Albumin 3.0 (L) 04/22/2021 06:33 AM  
 Globulin 3.6 04/22/2021 06:33 AM  
 A-G Ratio 0.8 (L) 04/22/2021 06:33 AM  
 ALT (SGPT) 48 04/22/2021 06:33 AM  
  
 
 
 ASSESSMENT 1. Complete heart block 2. Syncope 3. CAD, native 4. Percutaneous transluminal coronary angioplasty 5. CKD IV 
6. Hypertension 7. Hypothyroidism PLAN Dual chamber pacemaker recommended however once BP stable. Attempt to wean propofol and if BP stable will proceed with Hilda Andersen MD 
Cardiac Electrophysiology / Cardiology [address] [place] [address], [address] [place] [address], [address] Anjum Newell     Alayna Gupta 
[phone] / [phone] Fax   [phone] / [phone] Fax

## 2021-04-23 NOTE — PROGRESS NOTES
Cardiology Progress Note [address], [place], [address] [facility] Phone [phone]; Fax [phone] 
 
 
 
2021 10:10 AM  
 
Admit Date:           2021 Admit Diagnosis:  Third degree heart block ([facility] [I44.2] Bradycardia [R00.1] Elevated troponin [R77.8] BARAK (acute kidney injury) ([facility] [N17.9] :          1931 MRN:          [id number] Impression Plan/Recommendation 1. Acute resp failure- intubated 2. S/p cardiac arrest- bradycardia/ asystole 3. PAFlutter 4. CHB- temporary pacemaker in situ 5. BARAK/CKD- creatinine trending down 6. Anemia · Dr Mark Larson to place dual chamber ppm once pressors/sedation are weaned off- BP stable · V paced in the 60's · Off amiodarone · Echo shows pEF, cath w non obstructive CAD · Fevers resolved w tylenol · Blood cultures just came + gram + cocci in 1/2 first set and 2/2 second set- d/w Dr Mark Larson. Will need antibiotics before permanent ppm can be placed. Reviewed above w Ernesto Jordan, Dr Mark Larson and Dr Suzy Dias Pt personally seen and examined. Chart reviewed. Agree with advanced NP's history, exam and  A/P with changes/additons. Intubated/Sedated Blood pressure (!) 124/49, pulse 60, temperature 98.7 °F (37.1 °C), resp. rate 27, height 5' 7\" (1.702 m), weight 199 lb 15.3 oz (90.7 kg), SpO2 97 %. Off levophed/ On Amio gtt CVS-S1-S2 present,   2/6 systolic murmur present RS-   Coarse BS bilat A/P - Need PPM - however now has Blood cultures which are positive. D/w Dr Gail Sarmiento - ID. D/w Dr Mark Larson. Temp wire in situ - 100% paced D/w daughter - Ms Kyleigh Nowak Discussed with nursing Jason Saldivar MD, [facility] 
 
 
 
 
 4368 -  1900 In: 203.6 [I.V.:203.6] Out: 100 [Urine:100] Last 3 Recorded Weights in this Encounter  21 1190 04/22/21 1052 Weight: 200 lb (90.7 kg) 199 lb 15.3 oz (90.7 kg) 04/21 1901 - 04/23 0700 In: 2534.2 [I.V.:2534.2] Out: 945 [Urine:945] SUBJECTIVE Wheatley Bellis patient intubated and sedated Current Facility-Administered Medications Medication Dose Route Frequency  cefTRIAXone (ROCEPHIN) 1 g in sterile water (preservative free) 10 mL IV syringe  1 g IntraVENous Q24H  
 famotidine (PF) (PEPCID) 20 mg in 0.9% sodium chloride 10 mL injection  20 mg IntraVENous Q24H  
 lactated Ringers infusion  75 mL/hr IntraVENous CONTINUOUS  
 heparin (porcine) injection 5,000 Units  5,000 Units SubCUTAneous Q8H  
 fentaNYL citrate (PF) injection 50 mcg  50 mcg IntraVENous Q1H PRN  propofol (DIPRIVAN) 10 mg/mL infusion  0-50 mcg/kg/min IntraVENous TITRATE  sodium chloride (NS) flush 5-40 mL  5-40 mL IntraVENous Q8H  
 sodium chloride (NS) flush 5-40 mL  5-40 mL IntraVENous PRN  
 acetaminophen (TYLENOL) tablet 650 mg  650 mg Oral Q6H PRN Or  
 acetaminophen (TYLENOL) suppository 650 mg  650 mg Rectal Q6H PRN  polyethylene glycol (MIRALAX) packet 17 g  17 g Oral DAILY PRN  promethazine (PHENERGAN) tablet 12.5 mg  12.5 mg Oral Q6H PRN Or  
 ondansetron (ZOFRAN) injection 4 mg  4 mg IntraVENous Q6H PRN  
 NOREPINephrine (LEVOPHED) 8 mg in 5% dextrose 250mL (32 mcg/mL) infusion  0.5-16 mcg/min IntraVENous TITRATE OBJECTIVE Intake/Output Summary (Last 24 hours) at 4/23/2021 1010 Last data filed at 4/23/2021 0800 Gross per 24 hour Intake 2139.45 ml Output 725 ml Net 1414.45 ml Review of Systems - History obtained from the patient AS PER  [facility] Telemetry  60 PHYSICAL EXAM  
  
 
Visit Vitals /64 Pulse 60 Temp 98.7 °F (37.1 °C) Resp 23 Ht 5' 7\" (1.702 m) Wt 199 lb 15.3 oz (90.7 kg) SpO2 97% BMI 31.32 kg/m² Gen: Well-developed, elderly, pale, in no acute distress- intubated and sedated HEENT:  Pink conjunctivae, No scleral icterus or conjunctival, moist mucous membranes. OG tube/ETT Neck: Supple,No JVD Resp: No accessory muscle use, Clear breath sounds- anterior-  
Card: Regular Rate,Rythm,2/6 murmur , no rubs or gallop. No thrills. GI:           non-tender , rounded, mildly firm MSK: No cyanosis or clubbing Skin: No rashes or ulcers, pale. Temp pacer in groin- intact Neuro:  Sedated, grimaces to pain stimuli Psych:  [place] 
LE: +1 BLE edema- sami boots DATA REVIEW Laboratory and Imaging have been reviewed by me and are notable for Recent Labs  
  04/22/21 
4604 04/22/21 
0100 04/21/21 
1831 TROIQ 0.62* 0.36* 0.16* Recent Labs  
  04/23/21 
7151 04/22/21 
7896 04/21/21 
1831  135* 136  
K 4.2 4.6 4.7 CO2 22 23 22 BUN 45* 48* 45* CREA 1.72* 2.17* 2.16* * 134* 149* PHOS  --  3.2  --   
MG 2.4 2.4 2.4 WBC 7.9 8.1 8.1 HGB 9.9* 10.4* 11.3* HCT 30.8* 33.2* 35.1*  
 197 169 Kaitlin Puentes NP

## 2021-04-23 NOTE — PROGRESS NOTES
[facility] 
[address] [facility], [address] [place] [address] 
[phone] Medical Progress Note NAME: Angelique Allison :  1931 MRM:  [id number] Date/Time of service: 2021  9:14 AM 
 
  
Subjective: Chief Complaint:  Patient was personally seen and examined by me during this time period. Chart reviewed. Remains intubated and sedated. Unable to obtain review systems due to intubation and sedation. Objective:  
 
 
Vitals:  
 
 
Last 24hrs VS reviewed since prior progress note. Most recent are: 
 
Visit Vitals /64 Pulse 60 Temp 98.7 °F (37.1 °C) Resp 23 Ht 5' 7\" (1.702 m) Wt 90.7 kg (199 lb 15.3 oz) SpO2 97% BMI 31.32 kg/m² SpO2 Readings from Last 6 Encounters:  
21 97% 07/10/19 95% 18 93% 18 96% 18 97% 18 98% O2 Flow Rate (L/min): 10 l/min Intake/Output Summary (Last 24 hours) at 2021 0770 Last data filed at 2021 0800 Gross per 24 hour Intake 2265.4 ml Output 765 ml Net 1500.4 ml Exam:  
 
Physical Exam: 
 
Gen: Intubated HEENT: nontraumatic Resp:  No accessory muscle use, clear breath sounds without wheezes rales or rhonchi 
Card:  RRR, No murmurs, normal S1, S2, b/l peripheral edema Abd:  Soft, non-tender, non-distended, normoactive bowel sounds are present Musc:  No cyanosis or clubbing Skin: Diffuse ecchymosis Neuro: Sedated Psych: Sedated Medications Reviewed: (see below) Lab Data Reviewed: (see below) 
 
______________________________________________________________________ Medications:  
 
Current Facility-Administered Medications Medication Dose Route Frequency  cefTRIAXone (ROCEPHIN) 1 g in sterile water (preservative free) 10 mL IV syringe  1 g IntraVENous Q24H  
 famotidine (PF) (PEPCID) 20 mg in 0.9% sodium chloride 10 mL injection  20 mg IntraVENous Q24H  
 lactated Ringers infusion  75 mL/hr IntraVENous CONTINUOUS  
 heparin (porcine) injection 5,000 Units  5,000 Units SubCUTAneous Q8H  
 fentaNYL citrate (PF) injection 50 mcg  50 mcg IntraVENous Q1H PRN  propofol (DIPRIVAN) 10 mg/mL infusion  0-50 mcg/kg/min IntraVENous TITRATE  sodium chloride (NS) flush 5-40 mL  5-40 mL IntraVENous Q8H  
 sodium chloride (NS) flush 5-40 mL  5-40 mL IntraVENous PRN  
 acetaminophen (TYLENOL) tablet 650 mg  650 mg Oral Q6H PRN Or  
 acetaminophen (TYLENOL) suppository 650 mg  650 mg Rectal Q6H PRN  polyethylene glycol (MIRALAX) packet 17 g  17 g Oral DAILY PRN  promethazine (PHENERGAN) tablet 12.5 mg  12.5 mg Oral Q6H PRN Or  
 ondansetron (ZOFRAN) injection 4 mg  4 mg IntraVENous Q6H PRN  
 NOREPINephrine (LEVOPHED) 8 mg in 5% dextrose 250mL (32 mcg/mL) infusion  0.5-16 mcg/min IntraVENous TITRATE Lab Review:  
 
Recent Labs  
  04/23/21 
0337 04/22/21 
9478 04/21/21 
1831 WBC 7.9 8.1 8.1 HGB 9.9* 10.4* 11.3* HCT 30.8* 33.2* 35.1*  
 197 169 Recent Labs  
  04/23/21 
4806 04/22/21 
9156 04/21/21 
1831  135* 136  
K 4.2 4.6 4.7  105 106 CO2 22 23 22 * 134* 149* BUN 45* 48* 45* CREA 1.72* 2.17* 2.16* CA 9.0 8.4* 8.7 MG 2.4 2.4 2.4 PHOS  --  3.2  --   
ALB  --  3.0* 3.5 TBILI  --  0.4 0.5 ALT  --  48 42 INR  --  1.1 1.0 Lab Results Component Value Date/Time Glucose (POC) 116 (H) 08/05/2014 08:59 PM  
 
 
  
Assessment / Plan:  
 
Acute respiratory failure 
-Likely due to cardiac etiology 
-Remains intubated -Vent management per intensivist 
 
Cardiopulmonary arrest/Multiple arrhythmias 
-Status post complete heart block status post pacer, ventricular fibrillation with asystole and then aflutter 
-Status post epinephrine, amiodarone drip 
-Cardiology evaluating for possible pacemaker Shock  
-likely cardiogenic  
-c/w levophed, wean as able Elevated troponins 
-Likely due to arrest and defibrillation  
-Status post cath no remarkable coronary disease 
-Cardiology following BARAK/CKD 
-improving  
-Likely due to hypoperfusion due to arrest and hypotension 
--c/w IVF  
-Avoid nephrotoxins 
-Monitor renal function Concern for pneumonia 
-Low suspicion 
- blood cx flagged - follow , repeat blood cx  
-Continue 5 days ceftriaxone course 
-monitor Total time spent with patient: 35  Minutes **I personally saw and examined the patient during this time period** Care Plan discussed with: Patient, Nursing Staff and Consultant/Specialist 
 
Discussed:  Code Status and Care Plan Prophylaxis:  Hep SQ Disposition:  [facility]/Rehab 
        
___________________________________________________ Attending Physician: Trav Nelson DO

## 2021-04-23 NOTE — CONSULTS
Infectious Disease Consult Impression/Plan · GPC in chains isolated in 4 out of 4 blood cultures from 2 separate sites. Etiology unclear. UA bland. Await identification of organism. Will likely need CT of the abdomen pelvis but will hold off for now pending ID of organism. Suspect Streptococcus based on morphology however cannot rule out Enterococcus. Avoid vancomycin due to BARAK. Start daptomycin pending identification of the organism. May need to change transvenous pacer device if blood cultures do not clear. Serial blood cultures. Further recommendations pending identification of organism · Third-degree heart block. Temporary transvenous pacemaker emergently placed 4/21. Patient will need dual-chamber PPM per cardiology. I am concerned for endocarditis based on morphology of organism which suggests streptococcal or enterococcal  infection both of which have been associated with endocarditis. This is even more concerning with prominent cardiac murmur. Suggest RAMAN. Will discuss with cardiology. Timing of PPM not yet determined but will need sterile blood cultures and will need to rule out endocarditis prior to placement · BARAK/CKD · Mildly elevated AST. Possible biliary tract source of infection? Await ID of organism. Consider imaging based on above Anti-infectives: 1. Daptomycin 2. Ceftriaxone Subjective:  
Date of Consultation:  April 23, 2021 Date of Admission: 4/21/2021 Referring Physician:  
 
HISTORY OF PRESENT ILLNESS:    
Mr. Tory Hartley is a 80 y.o. male with history that includes HTN, CKD stage IV, HLD, and hypothyroidism presents from [facility] with reports that he has not been feeling well for the past couple of days and has been hypotensive. He was feeling worse on the day of admission and was brought to the ER where he was found to be bradycardic with third-degree AVB.   In the ER there is no improvement with atropine and a transcutaneous pacing was started. He subsequently developed ventricular fibrillation requiring CPR and cardioversion. Cardiology was consulted and he was taken to the Cath Lab 4/21 for left heart catheterization via left femoral vein. Blood cultures drawn 4/22 have returned growing GPC in chains in 4 out of 4 bottles drawn from 2 separate sites. Per discussion with cardiology the patient will need a permanent pacer once cleared from an infection standpoint. He is currently on ceftriaxone. The infectious disease service has been asked to assist with antibiotic management Patient Active Problem List  
Diagnosis Code  Hypertension I10  
 Hyperlipidemia E78.5  DDD (degenerative disc disease), lumbar M51.36  
 BPH (benign prostatic hyperplasia) N40.0  Aortic stenosis I35.0  Right knee DJD M17.11  
 ACP (advance care planning) Z71.89  
 History of throat cancer Z85.819  Presence of stent in coronary artery in patient with coronary artery disease I25.10, Z95.5  Mild asthma J45.909  Acute bronchitis J20.9  AVILA (dyspnea on exertion) R06.00  
 Hypoxia R09.02  
 Third degree heart block (HCC) I44.2  Bradycardia R00.1  Elevated troponin R77.8  BARAK (acute kidney injury) ([facility] [address] N17.9  Cardiogenic shock (HCC) R57.0  Asystole (HCC) I46.9  Ventricular fibrillation (HCC) I49.01 Past Medical History:  
Diagnosis Date  BPH (benign prostatic hyperplasia)  CKD (chronic kidney disease) 5/28/15 pt denies kidney disease  DDD (degenerative disc disease), lumbar  Elevated PSA  Endocrine disease Chronic kidney disease  Heart murmur Dr Zan Smith  Hoarse   
 x6 months as of 5/28/15; being evaluated by Dr Laura Johnson  Hyperlipemia  Hyperlipidemia  Hypertension Dr Chivo Dubon  Hypothyroidism  Ill-defined condition   
 aortic stenosis  Ill-defined condition   
 benign prostatic hyperplasia  Ill-defined condition   
 heart murmur  Osteoarthritis, shoulder  Osteoporosis  Positive PPD, treated   
 treated with INH  Sensorineural hearing loss   
 as of 5/28/15 pt wears hearing aids  Sleep disorder   
 insomnia  Unspecified adverse effect of anesthesia 1960s  
 delayed awaking Family History Problem Relation Age of Onset  Cancer Father   
     lung  Stroke Mother  Diabetes Mother  Diabetes Sister  Cancer Sister Social History Tobacco Use  Smoking status: Former Smoker Packs/day: 1.00 Years: 26.00 Pack years: 26.00 Types: Cigarettes Quit date: 1976 Years since quittin.3  Smokeless tobacco: Never Used Substance Use Topics  Alcohol use: Yes Alcohol/week: 1.0 standard drinks Types: 1 Shots of liquor per week Comment: 4-5x week burbon in afternoon Past Surgical History:  
Procedure Laterality Date  HX CATARACT REMOVAL Bilateral 2004  HX COLONOSCOPY  2006 [address] [place] [address]  HX INTRAOCULAR LENS PROSTHESIS    
 bilateral  
 HX LUMBAR LAMINECTOMY   Abbie Cuenca Keyes  HX MOHS PROCEDURES  prior to   
 and bicep repair  [place] TOTAL KNEE ARTHROPLASTY Right 14 Prior to Admission medications Medication Sig Start Date End Date Taking? Authorizing Provider  
magnesium hydroxide (Dior Milk of Magnesia) 400 mg/5 mL suspension Take 30 mL by mouth daily as needed for Constipation. Yes Provider, Historical  
pantoprazole (PROTONIX) 40 mg tablet Take 40 mg by mouth daily. Yes Provider, Historical  
metoprolol tartrate (LOPRESSOR) 25 mg tablet Take 25 mg by mouth two (2) times a day. Yes Provider, Historical  
diclofenac (Voltaren) 1 % gel Apply 1 g to affected area nightly. Yes Provider, Historical  
levothyroxine (SYNTHROID) 50 mcg tablet Take 50 mcg by mouth Daily (before breakfast).    Yes Provider, Historical  
tetrahydrozoline-zinc (Visine-AC) 0.05-0.25 % ophthalmic solution Administer 2 Drops to both eyes four (4) times daily as needed. Yes Provider, Historical  
gabapentin (NEURONTIN) 300 mg capsule Take 300 mg by mouth two (2) times a day. Yes Provider, Historical  
aspirin delayed-release 81 mg tablet Take 81 mg by mouth daily. Yes Provider, Historical  
acetaminophen (TYLENOL) 500 mg tablet Take 1,000 mg by mouth two (2) times daily as needed for Pain. Yes Provider, Historical  
clopidogrel (PLAVIX) 75 mg tab Take 75 mg by mouth daily. Yes Provider, Historical  
amLODIPine (NORVASC) 5 mg tablet Take 5 mg by mouth every morning. Yes Provider, Historical  
losartan (COZAAR) 100 mg tablet Take 1 Tab by mouth daily. 14  Yes Ash Lares MD  
 
No Known Allergies Review of Systems:  Review of systems not obtained due to patient factors. Objective:  
Blood pressure (!) 108/55, pulse 60, temperature 100.2 °F (37.9 °C), resp. rate 20, height 5' 7\" (1.702 m), weight 199 lb 15.3 oz (90.7 kg), SpO2 97 %. Temp (24hrs), Av.3 °F (37.9 °C), Min:98.7 °F (37.1 °C), Max:102.1 °F (38.9 °C) Exam:   
General:   Sedated on vent. NAD Eyes:  Sclera anicteric. Mouth/Throat: Mucous membranes normal.    
Neck: Supple Lungs:   Clear to auscultation anteriorly CV:  Regular rate and rhythm. 4/6 BRIAN Abdomen:   Soft, non-tender, nondistended Extremities:  No edema Skin:  No rash Lymph nodes: Musculoskeletal:   
Lines/Devices:   Right femoral transvenous pacer Psych:  Unable to assess Data Review:  
Recent Results (from the past 24 hour(s)) CBC WITH AUTOMATED DIFF Collection Time: 21  3:37 AM  
Result Value Ref Range WBC 7.9 4.1 - 11.1 K/uL  
 RBC 3.35 (L) 4.10 - 5.70 M/uL HGB 9.9 (L) 12.1 - 17.0 g/dL HCT 30.8 (L) 36.6 - 50.3 % MCV 91.9 80.0 - 99.0 FL  
 MCH 29.6 26.0 - 34.0 PG  
 MCHC 32.1 30.0 - 36.5 g/dL  
 RDW 13.9 11.5 - 14.5 % PLATELET 191 966 - 192 K/uL MPV 10.9 8.9 - 12.9 FL  
 NRBC 0.0 0  WBC  ABSOLUTE NRBC 0.00 0.00 - 0.01 K/uL NEUTROPHILS 69 32 - 75 % LYMPHOCYTES 19 12 - 49 % MONOCYTES 11 5 - 13 % EOSINOPHILS 1 0 - 7 % BASOPHILS 0 0 - 1 % IMMATURE GRANULOCYTES 0 0.0 - 0.5 % ABS. NEUTROPHILS 5.5 1.8 - 8.0 K/UL  
 ABS. LYMPHOCYTES 1.5 0.8 - 3.5 K/UL  
 ABS. MONOCYTES 0.9 0.0 - 1.0 K/UL  
 ABS. EOSINOPHILS 0.0 0.0 - 0.4 K/UL  
 ABS. BASOPHILS 0.0 0.0 - 0.1 K/UL  
 ABS. IMM. GRANS. 0.0 0.00 - 0.04 K/UL  
 DF AUTOMATED METABOLIC PANEL, BASIC Collection Time: 04/23/21  3:37 AM  
Result Value Ref Range Sodium 137 136 - 145 mmol/L Potassium 4.2 3.5 - 5.1 mmol/L Chloride 107 97 - 108 mmol/L  
 CO2 22 21 - 32 mmol/L Anion gap 8 5 - 15 mmol/L Glucose 114 (H) 65 - 100 mg/dL BUN 45 (H) 6 - 20 MG/DL Creatinine 1.72 (H) 0.70 - 1.30 MG/DL  
 BUN/Creatinine ratio 26 (H) 12 - 20 GFR est AA 46 (L) >60 ml/min/1.73m2 GFR est non-AA 38 (L) >60 ml/min/1.73m2 Calcium 9.0 8.5 - 10.1 MG/DL MAGNESIUM Collection Time: 04/23/21  3:37 AM  
Result Value Ref Range Magnesium 2.4 1.6 - 2.4 mg/dL Microbiology:   
 
Studies:   
 
Signed By: Melissa Maldonado DO April 23, 2021

## 2021-04-24 NOTE — PROGRESS NOTES
[facility] 
[address] 
[phone] Medical Progress Note NAME: Bruce Robison :  1931 MRM:  [id number] Date/Time of service: 2021  7:59 AM 
 
  
Subjective: Chief Complaint:  Patient was personally seen and examined by me during this time period. Chart reviewed. Remains intubated and sedated. Persistent fevers. Levophed stopped but now hypotensive again. Unable to obtain review systems due to intubation and sedation. Objective:  
 
 
Vitals:  
 
 
Last 24hrs VS reviewed since prior progress note. Most recent are: 
 
Visit Vitals BP (!) 102/42 Pulse 65 Temp (!) 101.9 °F (38.8 °C) Resp 25 Ht 5' 7\" (1.702 m) Wt 90.7 kg (199 lb 15.3 oz) SpO2 95% BMI 31.32 kg/m² SpO2 Readings from Last 6 Encounters:  
21 95% 07/10/19 95% 18 93% 18 96% 18 97% 18 98% O2 Flow Rate (L/min): 10 l/min Intake/Output Summary (Last 24 hours) at 2021 3569 Last data filed at 2021 0400 Gross per 24 hour Intake 2259.6 ml Output 1045 ml Net 1214.6 ml  
  
 
Exam:  
 
Physical Exam: 
 
Gen: Intubated HEENT: nontraumatic Resp:  No accessory muscle use, rhonchi b/l  
Card:  paced, b/l peripheral edema Abd:  Soft, non-tender, non-distended, normoactive bowel sounds are present Musc:  No cyanosis or clubbing Skin: Diffuse ecchymosis Neuro: Sedated Psych: Sedated Medications Reviewed: (see below) Lab Data Reviewed: (see below) 
 
______________________________________________________________________ Medications:  
 
Current Facility-Administered Medications Medication Dose Route Frequency  albuterol-ipratropium (DUO-NEB) 2.5 MG-0.5 MG/3 ML  3 mL Nebulization Q6H RT  
 cefTRIAXone (ROCEPHIN) 2 g in sterile water (preservative free) 20 mL IV syringe  2 g IntraVENous Q24H  
 DAPTOmycin (CUBICIN) 550 mg in 0.9% sodium chloride 11 mL IV Syringe  550 mg IntraVENous Q24H  
 famotidine (PF) (PEPCID) 20 mg in 0.9% sodium chloride 10 mL injection  20 mg IntraVENous Q24H  
 lactated Ringers infusion  75 mL/hr IntraVENous CONTINUOUS  
 heparin (porcine) injection 5,000 Units  5,000 Units SubCUTAneous Q8H  
 fentaNYL citrate (PF) injection 50 mcg  50 mcg IntraVENous Q1H PRN  propofol (DIPRIVAN) 10 mg/mL infusion  0-50 mcg/kg/min IntraVENous TITRATE  sodium chloride (NS) flush 5-40 mL  5-40 mL IntraVENous Q8H  
 sodium chloride (NS) flush 5-40 mL  5-40 mL IntraVENous PRN  
 acetaminophen (TYLENOL) tablet 650 mg  650 mg Oral Q6H PRN Or  
 acetaminophen (TYLENOL) suppository 650 mg  650 mg Rectal Q6H PRN  polyethylene glycol (MIRALAX) packet 17 g  17 g Oral DAILY PRN  promethazine (PHENERGAN) tablet 12.5 mg  12.5 mg Oral Q6H PRN Or  
 ondansetron (ZOFRAN) injection 4 mg  4 mg IntraVENous Q6H PRN  
 NOREPINephrine (LEVOPHED) 8 mg in 5% dextrose 250mL (32 mcg/mL) infusion  0.5-16 mcg/min IntraVENous TITRATE Lab Review:  
 
Recent Labs  
  04/24/21 
0511 04/23/21 
7323 04/22/21 
8095 WBC 8.0 7.9 8.1 HGB 9.8* 9.9* 10.4* HCT 31.5* 30.8* 33.2*  
 191 197 Recent Labs  
  04/23/21 
9353 04/22/21 
1353 04/21/21 
1831  135* 136  
K 4.2 4.6 4.7  105 106 CO2 22 23 22 * 134* 149* BUN 45* 48* 45* CREA 1.72* 2.17* 2.16* CA 9.0 8.4* 8.7 MG 2.4 2.4 2.4 PHOS  --  3.2  --   
ALB  --  3.0* 3.5 TBILI  --  0.4 0.5 ALT  --  48 42 INR  --  1.1 1.0 Lab Results Component Value Date/Time Glucose (POC) 116 (H) 08/05/2014 08:59 PM  
 
 
  
Assessment / Plan:  
 
Acute respiratory failure 
-Likely due to cardiac and infectious etiologies  
-Remains intubated -Vent management per intensivist 
 
Cardiopulmonary arrest/Multiple arrhythmias 
-Status post complete heart block status post pacer, ventricular fibrillation with asystole and then aflutter 
-remains transcutaneously paced - etiology for heart block possible endocarditis  
-Cardiology evaluating for possible pacemaker, will also need RAMAN due to concern for endocarditis - will discuss with cardio Shock  
-likely cardiogenic and septic  
-c/w levophed, wean as able Bacteremia - persistent  
-concern for endocarditis - streptococcus 4/22, repeat blood cx  
- Concern for pneumonia but low suspicion 
-Continue high dose ceftriaxone and daptomyocin  
-ID following Elevated troponins 
-Likely due to arrest and defibrillation  
-Status post cath no remarkable coronary disease 
-Cardiology following BARAK/CKD 
-improving  
-Likely due to hypoperfusion due to arrest and hypotension 
--c/w IVF  
-Avoid nephrotoxins 
-Monitor renal function Total time spent with patient: 28  Minutes **I personally saw and examined the patient during this time period** Care Plan discussed with: Patient, Nursing Staff and Consultant/Specialist 
 
Discussed:  Code Status and Care Plan Prophylaxis:  Hep SQ Disposition:  [facility]/Rehab 
        
___________________________________________________ Attending Physician: Stoney Tovar DO

## 2021-04-24 NOTE — PROGRESS NOTES
Ventilator patient transported to CT and returned to ICU. Patient remained on ventilator throughout transport and testing. No problems noted.

## 2021-04-24 NOTE — PROGRESS NOTES
visit for routine follow up. Patient intubated and sedated, resting quietly. Appears comfortable. Patient's daughter, Elizabeth Brar, arrived during visit. Provided spiritual presence and listening as she spoke of her present thoughts, feelings, and concerns. Spoke of her father's health and updated changes since last visit. Said she has been able to get some sleep and spoke about the impact of this event on her own life mentally, physically, and emotionally. Says she has a strong prayer group from her [employer] which continues to pray for her father and family. She appeared comforted as a result of this visit and expressed gratitude for this visit. Rev. Madan Mcmillan MDiv, [facility], [facility]  paging service: [phone])

## 2021-04-24 NOTE — PROGRESS NOTES
Bedside and Verbal shift change report given to [id number] Cecil Dior RN (oncoming nurse) by Jennifer Whipple (offgoing nurse). Report included the following information SBAR, Kardex, ED Summary, Procedure Summary, Intake/Output, MAR, Med Rec Status, Cardiac Rhythm Paced, Alarm Parameters  and Quality Measures.

## 2021-04-24 NOTE — PROGRESS NOTES
Problem: Non-Violent Restraints Goal: Removal from restraints as soon as assessed to be safe Outcome: Progressing Towards Goal 
Goal: No harm/injury to patient while restraints in use Outcome: Progressing Towards Goal 
Goal: Patient's dignity will be maintained Outcome: Progressing Towards Goal 
Goal: Patient Interventions Outcome: Progressing Towards Goal 
  
Problem: Ventilator Management Goal: *Adequate oxygenation and ventilation Outcome: Progressing Towards Goal 
Goal: *Patient maintains clear airway/free of aspiration Outcome: Progressing Towards Goal 
Goal: *Absence of infection signs and symptoms Outcome: Progressing Towards Goal 
Goal: *Normal spontaneous ventilation Outcome: Progressing Towards Goal 
  
Problem: Patient Education: Go to Patient Education Activity Goal: Patient/Family Education Outcome: Progressing Towards Goal 
  
Problem: Falls - Risk of 
Goal: *Absence of Falls Description: Document Burrell Canavan Fall Risk and appropriate interventions in the flowsheet. Outcome: Progressing Towards Goal 
Note: Fall Risk Interventions: 
  
 
Mentation Interventions: Adequate sleep, hydration, pain control, Bed/chair exit alarm, Door open when patient unattended, Evaluate medications/consider consulting pharmacy, Family/sitter at bedside, More frequent rounding, Reorient patient Medication Interventions: Assess postural VS orthostatic hypotension, Evaluate medications/consider consulting pharmacy, Patient to call before getting OOB Elimination Interventions: Bed/chair exit alarm, Patient to call for help with toileting needs, Toileting schedule/hourly rounds History of Falls Interventions: Bed/chair exit alarm, Consult care management for discharge planning, Door open when patient unattended, Evaluate medications/consider consulting pharmacy, Vital signs minimum Q4HRs X 24 hrs (comment for end date) Problem: Patient Education: Go to Patient Education Activity Goal: Patient/Family Education Outcome: Progressing Towards Goal 
  
Problem: Pressure Injury - Risk of 
Goal: *Prevention of pressure injury Description: Document Fabio Scale and appropriate interventions in the flowsheet. Outcome: Progressing Towards Goal 
Note: Pressure Injury Interventions: 
Sensory Interventions: Assess changes in LOC, Assess need for specialty bed, Avoid rigorous massage over bony prominences, Check visual cues for pain, Discuss PT/OT consult with provider, Float heels Moisture Interventions: Absorbent underpads, Apply protective barrier, creams and emollients, Assess need for specialty bed, Check for incontinence Q2 hours and as needed, Internal/External urinary devices, Maintain skin hydration (lotion/cream), Minimize layers Activity Interventions: Assess need for specialty bed, Pressure redistribution bed/mattress(bed type), PT/OT evaluation Mobility Interventions: Assess need for specialty bed, HOB 30 degrees or less, Pressure redistribution bed/mattress (bed type), PT/OT evaluation, Turn and reposition approx. every two hours(pillow and wedges) Nutrition Interventions: Document food/fluid/supplement intake, Discuss nutritional consult with provider Friction and Shear Interventions: Apply protective barrier, creams and emollients, HOB 30 degrees or less, Lift sheet, Lift team/patient mobility team, Minimize layers, Transfer aides:transfer board/Glenn lift/ceiling lift, Transferring/repositioning devices Problem: Patient Education: Go to Patient Education Activity Goal: Patient/Family Education Outcome: Progressing Towards Goal

## 2021-04-24 NOTE — PROGRESS NOTES
Bedside shift change report given to Marcela Stevenson RN (oncoming nurse) by Kt HENRIQUEZ (offgoing nurse). Report included the following information Kardex, Intake/Output, MAR, Accordion and Recent Results. Primary Nurse Kourtney Cavazos RN and Kt RN performed a dual skin assessment on this patient No impairment noted Fabio score is 10 Assessment:  
 
0700 - Patient sedated. Prop running @35. Withdraws from pains. Pupils equal and reactive. Lungs rhonchi sounding. Vent: PRVC  Peep 8 RR 20 FI02 40%. TCP intact. Setting 60BPM 5mA. V paced. No signs of hematoma at site, + pulses, cool skin. No signs of skin breakdown. Abimael boots in place. Stephens draining clear yellow urine. Increased TF 30ML/HR(goal 50ml/hr). Confirmed placement, no residual  
 
0800 -  Mouth care and suction completed. Turned patient to right side. 0900 - Rounded with Dr. Robert Caldwell. Daughter at bedside. 1000 -  Mouth care and suction completed. patient to supine. [facility] to inquire about using cordis line for vasopressor purposes. He agreed ok to use. Notified Dr. Robert Caldwell. ID ordered CT chest/abd/pelvs with PO contrast. Will coordinate with RT and charge nurse. CT notified. Will give PO contrast at 1500.  
 
1200 - Mouth care and suction completed. Turned patient to left side. Deep suctioned and lavaged. Thick white secretions. Increased prop to 40mcg. Levo @5 with MAP 70.  
1300 -  
 
1400 - Mouth care and suction completed. See flowsheet for turns 1500 - stopped TF. Gave PO contrast via OGT.  
 
1600 - Disconnected patient from monitor. Tele tech notified. Respiratory, tech,RRT RN and transport at bedside. Connected to remote monitor to go to CT 
1625-back from CT. Patient reconnected. Connected to new TF tubing. Ice packs applied. Repositioned and placed in reverse trendelenburg. 1800 - Mouth care and suction completed. Turn patient to left side.   
 
1900 - Bedside shift change report given to Kt HENRIQUEZ (oncoming nurse) by Caleb Victoria RN (offgoing nurse). Report included the following information Kardex, Procedure Summary, Intake/Output, MAR, Accordion, Recent Results, Med Rec Status, Cardiac Rhythm Paced, Alarm Parameters  and Quality Measures.

## 2021-04-24 NOTE — PROGRESS NOTES
[facility] Progress Note Marina Lawrence DO 
  [phone] Office [phone]  Fax 2021 Assessment & Plan:  
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 Discussed with cardiology. RAMAN planned. Continue ceftriaxone and daptomycin. Can likely stop daptomycin once sensitivities reported. Repeat blood culture sent today. Check CT C-A-P to rule out metastatic infection/occult abscesses with ongoing fevers. .  May need to change transvenous pacer device if blood cultures do not clear. Serial blood cultures. Further recommendations pending identification of organism. Suspect dental source of infection however per patient's daughter patient had dental exam within the last several months which was unremarkable · Third-degree heart block. Temporary transvenous pacemaker emergently placed . Patient will need dual-chamber PPM per cardiology. I am concerned for endocarditis as outlined above. Await RAMAN. Timing of PPM not yet determined but will need sterile blood cultures and will need to rule out endocarditis prior to placement · BARAK/CKD · Mildly elevated AST. Better today. CT scanning of the abdomen planned Subjective: Intubated and sedated. Remains febrile Objective:  
 
Vitals:  
Visit Vitals BP (!) 116/47 Pulse 60 Temp (!) 100.8 °F (38.2 °C) Resp 26 Ht 5' 7\" (1.702 m) Wt 199 lb 15.3 oz (90.7 kg) SpO2 97% BMI 31.32 kg/m² Tmax:  Temp (24hrs), Av.2 °F (38.4 °C), Min:100.2 °F (37.9 °C), Max:102.2 °F (39 °C) Exam:  
Patient is intubated:  no 
 
Physical Examination:  
General:   Sedated on vent Head:  Normocephalic, atraumatic. Eyes:  Conjunctivae clear Neck: Supple Lungs:   No distress.   Clear to auscultation anteriorly Chest wall:    
Heart:  Regular rate and rhythm. 4/6 BRIAN Abdomen:   Soft, non-tender, non-distended Extremities:  No edema. Right femoral vein transvenous pacer site unremarkable Skin:  No rash Neurologic:  Unable to assess Labs:     
 
No lab exists for component: ITNL Recent Labs  
  04/24/21 
0820 04/22/21 
8378 04/22/21 
0100 04/21/21 
1831 CPK 38*  --   --   --   
TROIQ  --  0.62* 0.36* 0.16* Recent Labs  
  04/24/21 
0820 04/24/21 
8045 04/23/21 
1278 04/22/21 
7723 04/21/21 
1831   --  137 135* 136  
K 4.3  --  4.2 4.6 4.7   --  107 105 106 CO2 21  --  22 23 22 BUN 38*  --  45* 48* 45* CREA 1.50*  --  1.72* 2.17* 2.16* *  --  114* 134* 149* PHOS  --   --   --  3.2  --   
MG  --   --  2.4 2.4 2.4 ALB 2.5*  --   --  3.0* 3.5 WBC  --  8.0 7.9 8.1 8.1 HGB  --  9.8* 9.9* 10.4* 11.3* HCT  --  31.5* 30.8* 33.2* 35.1*  
PLT  --  193 191 197 169 Recent Labs  
  04/22/21 
4323 04/21/21 
1831 INR 1.1 1.0 PTP 11.1 10.8 APTT  --  27.5 Needs: urine analysis, urine sodium, protein and creatinine Lab Results Component Value Date/Time Creatinine, urine 148.1 02/21/2014 10:54 AM  
 
 
 
Cultures: No results found for: SATYA Lab Results Component Value Date/Time Culture result: (A) 04/22/2021 04:51 AM  
  STREPTOCOCCUS SANGUINIS GROWING IN BOTH BOTTLES DRAWN (SITE = L WRIST) SENSITIVITY TO FOLLOW Culture result: (A) 04/22/2021 04:51 AM  
  STREPTOCOCCUS SANGUINIS GROWING IN BOTH BOTTLES DRAWN (SITE = L FOREARM) SENSITIVITY TO FOLLOW Culture result: MRSA NOT PRESENT 04/22/2021 01:00 AM  
 Culture result:  04/22/2021 01:00 AM  
  Screening of patient nares for MRSA is for surveillance purposes and, if positive, to facilitate isolation considerations in high risk settings. It is not intended for automatic decolonization interventions per se as regimens are not sufficiently effective to warrant routine use. Radiology:  
 
Medications Current Facility-Administered Medications Medication Dose Route Frequency Last Admin  albuterol-ipratropium (DUO-NEB) 2.5 MG-0.5 MG/3 ML  3 mL Nebulization Q6H RT 3 mL at 04/24/21 0833  
 midazolam (VERSED) injection 1 mg  1 mg IntraVENous Q2H PRN    
 iohexoL (OMNIPAQUE) 240 mg iodine/mL solution 50 mL  50 mL Oral ONCE    
 cefTRIAXone (ROCEPHIN) 2 g in sterile water (preservative free) 20 mL IV syringe  2 g IntraVENous Q24H 2 g at 04/24/21 0930  DAPTOmycin (CUBICIN) 550 mg in 0.9% sodium chloride 11 mL IV Syringe  550 mg IntraVENous Q24H 550 mg at 04/23/21 1643  famotidine (PF) (PEPCID) 20 mg in 0.9% sodium chloride 10 mL injection  20 mg IntraVENous Q24H 20 mg at 04/24/21 0930  
 lactated Ringers infusion  75 mL/hr IntraVENous CONTINUOUS 75 mL/hr at 04/23/21 2324  heparin (porcine) injection 5,000 Units  5,000 Units SubCUTAneous Q8H 5,000 Units at 04/24/21 0559  
 fentaNYL citrate (PF) injection 50 mcg  50 mcg IntraVENous Q1H PRN 50 mcg at 04/24/21 0019  propofol (DIPRIVAN) 10 mg/mL infusion  0-50 mcg/kg/min IntraVENous TITRATE 35 mcg/kg/min at 04/24/21 0950  sodium chloride (NS) flush 5-40 mL  5-40 mL IntraVENous Q8H 10 mL at 04/24/21 0559  sodium chloride (NS) flush 5-40 mL  5-40 mL IntraVENous PRN    
 acetaminophen (TYLENOL) tablet 650 mg  650 mg Oral Q6H  mg at 04/24/21 0029 Or  acetaminophen (TYLENOL) suppository 650 mg  650 mg Rectal Q6H  mg at 04/24/21 0602  polyethylene glycol (MIRALAX) packet 17 g  17 g Oral DAILY PRN  promethazine (PHENERGAN) tablet 12.5 mg  12.5 mg Oral Q6H PRN Or  
 ondansetron (ZOFRAN) injection 4 mg  4 mg IntraVENous Q6H PRN    
 NOREPINephrine (LEVOPHED) 8 mg in 5% dextrose 250mL (32 mcg/mL) infusion  0.5-16 mcg/min IntraVENous TITRATE 5 mcg/min at 04/24/21 1127 Case discussed with: Nursing David Putnam DO

## 2021-04-24 NOTE — PROGRESS NOTES
Critical Care Progress Note Date:2021       Room:[address] [place] [address] Patient Maged Luciano     YOB: 1931     Age:89 y.o. Subjective Subjective:  
: No major events overnight. Sedated on propofol due to periods of agitation. Receiving Fentanyl PRN. On Levophed. LA 0.8 On Vent support: PRVC 20/400/40/8. AB.4/31/86. CXR stable. Cr  Trending down to 1.5 UO: 1L Tolerating TF. Hb  Stable 9.8 Plts stable 193 WBCs normal,Febrile (102) on ice pack. Repeat CX sent. ID consulted,Daptomycin added. I/O=2.3/1.0=+1.3L 
 
: Sedated on propofol. CT head:no acute process. On low dose of Levophed. BNP 8800. Pacemaker in place. On VCV: 20/500/60/8. Cr down from 2.17 to 1.72 UO: 0.7L NPO Hb down from 10.4 to 9.9 Plts stable 191 T max: 102.1 ( @ 21:00), T :100.2 this morning. WBCs normal. I/O=+1.5L Review of Systems: Unable to obtain given sedation Objective Vitals Last 24 Hours: TEMPERATURE:  Temp  Av.2 °F (38.4 °C)  Min: 100.2 °F (37.9 °C)  Max: 102.2 °F (39 °C) RESPIRATIONS RANGE: Resp  Av.4  Min: 11  Max: 37 PULSE OXIMETRY RANGE: SpO2  Av.7 %  Min: 95 %  Max: 99 % PULSE RANGE: Pulse  Av.2  Min: 60  Max: 65 BLOOD PRESSURE RANGE: Systolic (86WHS), VAU:339 , Min:95 , TIR:012  
; Diastolic (87BAE), AHS:55, Min:41, Max:78 I/O (24Hr): Intake/Output Summary (Last 24 hours) at 2021 1127 Last data filed at 2021 1100 Gross per 24 hour Intake 2533.03 ml Output 1010 ml Net 1523.03 ml Physical Exam: 
 
 
I examined the patient via telemedicine, with its associated limitations. I reviewed the electronic medical record, the x-rays, labs, progress notes, previous history and physicals and consultation notes that were available in the electronic medical record. I beamed in and examined the patient with assistance of house staff On exam: 
 
Sedated Intubated S1,S2 Decreased breath sounds bilateral at bases Abdomen soft Introducer in groin Objective: 
Vital signs: (most recent): Blood pressure (!) 118/46, pulse 60, temperature (!) 100.8 °F (38.2 °C), resp. rate 24, height 5' 7\" (1.702 m), weight 90.7 kg (199 lb 15.3 oz), SpO2 96 %. Labs/Imaging/Diagnostics Labs: CBC: 
Recent Labs  
  04/24/21 
0511 04/23/21 
3878 04/22/21 
7289 WBC 8.0 7.9 8.1  
RBC 3.36* 3.35* 3.53* HGB 9.8* 9.9* 10.4* HCT 31.5* 30.8* 33.2*  
MCV 93.8 91.9 94.1  
RDW 13.7 13.9 13.7  191 197 CHEMISTRIES: 
Recent Labs  
  04/24/21 
0820 04/23/21 
0337 04/22/21 
0351 04/21/21 
1831  137 135* 136  
K 4.3 4.2 4.6 4.7  107 105 106 CO2 21 22 23 22 BUN 38* 45* 48* 45*  
CA 8.5 9.0 8.4* 8.7 PHOS  --   --  3.2  --   
MG  --  2.4 2.4 2.4 PT/INR: 
Recent Labs  
  04/22/21 
9912 04/21/21 
1831 INR 1.1 1.0 APTT: 
Recent Labs  
  04/21/21 
1831 APTT 27.5 LIVER PROFILE: 
Recent Labs  
  04/24/21 
0820 04/22/21 
0253 04/21/21 
1831 AST 30 46* 39* ALT 34 48 42 Lab Results Component Value Date/Time ALT (SGPT) 34 04/24/2021 08:20 AM  
 AST (SGOT) 30 04/24/2021 08:20 AM  
 Alk. phosphatase 130 (H) 04/24/2021 08:20 AM  
 Bilirubin, total 0.4 04/24/2021 08:20 AM  
 
 
Imaging Last 24 Hours: 
Xr Chest [place] Result Date: 4/24/2021 Clinical history: fever INDICATION:   fever COMPARISON: 4/21/2021 FINDINGS: AP portable upright view of the chest demonstrates a stable  cardiopericardial silhouette. Bibasilar atelectasis and effusion. ET tube and NG tube are stable. There is no pneumothorax. . Patient is on a cardiac monitor. No significant change. Assessment//Plan Active Problems: Hypertension (8/1/2013) Hyperlipidemia (8/1/2013) BPH (benign prostatic hyperplasia) (8/1/2013) Hypoxia (4/21/2021) Third degree heart block ([address] (4/21/2021) Bradycardia (4/21/2021) Elevated troponin (4/21/2021) BARAK (acute kidney injury) ([address] (4/21/2021)   Cardiogenic shock ([address] (4/21/2021) Asystole ([address] (4/21/2021) Ventricular fibrillation ([address] (4/21/2021) Assessment & Plan Assessment:  
Sp Cardiac Arrest with ROSC Acute Resp Failure CHB sp temporary Pacemaker BARAK Shock,circulatory Bacteremia: GPC in chains Plan: 
Wean Propofol as tolerated. Versed PRN given hypotension to facilitate weaning of propofol . Fentyanyl PRN Pacemaker in place. Cardiology following. Wean Levophed as tolerated. Vent support. Adjust RR and TV based on ABG. Wean FiO2 and PEEP to keep Sat>92%. Unable to proceed with PSV given agitation and risk of pacemaker dislodgement from groin. Follwo ABG and CXR. Unable Monitor UO and renal indices. Continue TF. Bowel regimen Monitor H/H and Plts Continue  ceftriaxone and Daptomycin. ID following. Follow BCx results. Will need CT A/P and RAMAN. Monitor I/O. Continue LR infusion SQ Heparin/Pepcid Very poor prognosis 04/24:Discussed with daughter at bedside. I performed all aspects of the physical examination via Telemedicine associated with two way audio and video communication and with the on-site assistance of  the bedside nurse. I am located in [facility] and the patient is located in [place]. The patient is critically ill in the ICU. I  personally  reviewed the pertinent medical records, laboratory/ pathology data and radiographic images. The decision making regarding this patient is as documented above, which was generated  following  discussion  with the multidisciplinary ICU team and creation of a treatment plan for  the patient. We discussed the patient's interval history and future coordination of care and  plans. The patient's medications  were reviewed and changes made as stipulated above.   Due to  critical illness impairing one or more vital organs of this patient resulting in life threatening clinical situation  I have provided direct, frequent personal  assessment and manipulation in management plan and spent 40 minutes  of  critical care time excluding the time spent on procedures and teaching. Greater than 50% of this time  in patients care was  employed  in counseling and coordination of care and engaged in face to face discussion of case management issues, addressing questions, and outlining a plan of  therapy. Pt at risk of life threatening deterioration from circulatory shock. Pt seen and evaluated via tele encounter. Audio and Video were used for this interaction.  
 
 
Electronically signed by Caio Gray MD on 4/24/2021 at 12:58 PM

## 2021-04-25 NOTE — PROGRESS NOTES
0700-Bedside shift change report given to Charlie Ya (oncoming nurse) by Danny Dickson RN  (offgoing nurse). Report included the following information Kardex, Intake/Output, MAR, Accordion, Recent Results, Med Rec Status, Cardiac Rhythm Paced, Alarm Parameters  and Quality Measures. Primary Nurse Chauncey Pastrana, RN and Danny Dickson, RN performed a dual skin assessment on this patient No impairment noted Fabio score is 10 Assessment:  
Neuro: Patient sedated on 50 of prop. Responds to touch. Gag reflex. Pupils equal and reactive Cardio: Paced. Levo @ 6 for MAP goal >65. Transvenous pacer in place. Settings: 60 BMP @ 5mA. Gen edema. Upper: +1 lower: +2.  
Resp: rhonchi/corase lung sounds. Vent: PRVC  RR 20 Peep 8 Fi02 40%. Suctioned oral secretions. Tachypnea 24-30's RR 
GI/: Active bowel sounds. Vital HP infusing @30 with 50ml water flushes. Stephens cath in place draining clear yellow urine Skin: intact. UE's weeping. 0800 -  Mouth care and suction completed. Turned patient to right side. 0900 - Rounded with Dr. Pritesh Yanes. Considering changing sedatives from propofol to precedex. Awaiting. Will speak with hospitalist and family concerning plan. OK to advance tube feeds to goal while in reverse trendelenburg. 0930-daughter at bedside. Gave update 1000 -  Mouth care and suction completed. Turned patient to left side. Advanced TF to 40ml/hr. 1200 - Mouth care and suction completed. Patient supine in bed. Arms/legs elevated with pillows. Sedated. Decreased prop to 45mcg  and levo to 5.  
1220-Tolerating med changes. MAP 72 
1230-Dr. Lopez Earing at bedside updating daughter. 1305-Decrease levo to 4mcg. MAP 86.  
1325-MAP 75 
 
1400 - Mouth care and suction completed. Complete bath complete and linen/gown change. Small BM. Stephens care. Turned patient to right side. 1530-Notified Dr. Pritesh Yanes of triglycerides result. Ordered versed drip and instructed to wean prop off. 
 
1600 - Mouth care and suction completed. See flowsheet for turns. No changes with reassessment. 1700 - initiated versed and weaning prop. See MAR for rate changes. 1800 - Mouth care and suction completed. Turn patient to left side. 1900 - Bedside shift change report given to Deb Marte (oncoming nurse) by Tevin Thompson RN (offgoing nurse). Report included the following information Kardex, Intake/Output, MAR, Accordion, Recent Results, Med Rec Status, Cardiac Rhythm Paced, Alarm Parameters  and Quality Measures.

## 2021-04-25 NOTE — PROGRESS NOTES
[facility] Progress Note Lois Valentin DO 
  [phone] Office [phone]  Fax 2021 Assessment & Plan:  
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 Discussed with cardiology. RAMAN planned. Continue ceftriaxone. Stop daptomycin based on organism sensitivities. Repeat blood cultures NGSF. CT C-A-P relatively unremarkable. No obvious source of infection or occult abscesses on imaging. .  May need to change transvenous pacer device if blood cultures do not clear. Further recommendations pending identification of organism. Suspect dental source of infection however per patient's daughter patient had dental exam within the last several months which was unremarkable · Third-degree heart block. Temporary transvenous pacemaker emergently placed . Patient will need dual-chamber PPM per cardiology. I am concerned for endocarditis as outlined above. Await RAMAN. Timing of PPM not yet determined but will need sterile blood cultures and will need to rule out endocarditis prior to placement · BARAK/CKD · Mildly elevated AST. Gallbladder sludge on CT. Will need RUQ ultrasound Subjective: Intubated and sedated. Fever trending down Objective:  
 
Vitals:  
Visit Vitals BP (!) 145/51 Pulse 60 Temp 99.3 °F (37.4 °C) Resp 21 Ht 5' 7\" (1.702 m) Wt 199 lb 15.3 oz (90.7 kg) SpO2 96% BMI 31.32 kg/m² Tmax:  Temp (24hrs), Av.2 °F (37.3 °C), Min:98.5 °F (36.9 °C), Max:100 °F (37.8 °C) Exam:  
Patient is intubated:  no 
 
Physical Examination:  
General:   Sedated on vent Head:  Normocephalic, atraumatic. Eyes:  Conjunctivae clear Neck: Supple Lungs:   No distress. Clear to auscultation anteriorly Chest wall:    
Heart: Regular rate and rhythm. 4/6 BRIAN Abdomen:   Soft, non-tender, non-distended Extremities:  No edema. Right femoral vein transvenous pacer site unremarkable Skin:  No rash Neurologic:  Unable to assess Labs:     
 
No lab exists for component: ITNL Recent Labs  
  04/24/21 
0820 CPK 38* Recent Labs  
  04/25/21 
4361 04/25/21 
0419 04/24/21 
0820 04/24/21 
2765 04/23/21 
0497 *  --  136  --  137  
K 4.5  --  4.3  --  4.2   --  108  --  107 CO2 22  --  21  --  22 BUN 38*  --  38*  --  45* CREA 1.40*  --  1.50*  --  1.72* *  --  109*  --  114* MG  --   --   --   --  2.4 ALB 2.2*  --  2.5*  --   --   
WBC  --  7.6  --  8.0 7.9 HGB  --  9.5*  --  9.8* 9.9* HCT  --  29.8*  --  31.5* 30.8* PLT  --  225  --  193 191 No results for input(s): INR, PTP, APTT, INREXT, INREXT in the last 72 hours. Needs: urine analysis, urine sodium, protein and creatinine Lab Results Component Value Date/Time Creatinine, urine 148.1 02/21/2014 10:54 AM  
 
 
 
Cultures: No results found for: SDES Lab Results Component Value Date/Time Culture result: NO GROWTH 1 DAY 04/24/2021 08:20 AM  
 Culture result: (A) 04/22/2021 04:51 AM  
  STREPTOCOCCUS SANGUINIS GROWING IN BOTH BOTTLES DRAWN (SITE = L WRIST) Culture result: (A) 04/22/2021 04:51 AM  
  STREPTOCOCCUS SANGUINIS GROWING IN BOTH BOTTLES DRAWN (SITE = L FOREARM) Radiology:  
 
Medications Current Facility-Administered Medications Medication Dose Route Frequency Last Admin  albuterol-ipratropium (DUO-NEB) 2.5 MG-0.5 MG/3 ML  3 mL Nebulization Q6H RT 3 mL at 04/25/21 0721  
 midazolam (VERSED) injection 1 mg  1 mg IntraVENous Q2H PRN 1 mg at 04/24/21 1546  cefTRIAXone (ROCEPHIN) 2 g in sterile water (preservative free) 20 mL IV syringe  2 g IntraVENous Q24H 2 g at 04/25/21 8262  DAPTOmycin (CUBICIN) 550 mg in 0.9% sodium chloride 11 mL IV Syringe  550 mg IntraVENous Q24H 550 mg at 04/24/21 1542  famotidine (PF) (PEPCID) 20 mg in 0.9% sodium chloride 10 mL injection  20 mg IntraVENous Q24H 20 mg at 04/25/21 3582  lactated Ringers infusion  75 mL/hr IntraVENous CONTINUOUS 75 mL/hr at 04/25/21 0240  heparin (porcine) injection 5,000 Units  5,000 Units SubCUTAneous Q8H 5,000 Units at 04/25/21 0629  
 fentaNYL citrate (PF) injection 50 mcg  50 mcg IntraVENous Q1H PRN 50 mcg at 04/24/21 0019  propofol (DIPRIVAN) 10 mg/mL infusion  0-50 mcg/kg/min IntraVENous TITRATE 45 mcg/kg/min at 04/25/21 1247  
 sodium chloride (NS) flush 5-40 mL  5-40 mL IntraVENous Q8H 10 mL at 04/25/21 0631  
 sodium chloride (NS) flush 5-40 mL  5-40 mL IntraVENous PRN    
 acetaminophen (TYLENOL) tablet 650 mg  650 mg Oral Q6H  mg at 04/24/21 6963 Or  acetaminophen (TYLENOL) suppository 650 mg  650 mg Rectal Q6H  mg at 04/24/21 0602  polyethylene glycol (MIRALAX) packet 17 g  17 g Oral DAILY PRN  promethazine (PHENERGAN) tablet 12.5 mg  12.5 mg Oral Q6H PRN Or  
 ondansetron (ZOFRAN) injection 4 mg  4 mg IntraVENous Q6H PRN    
 NOREPINephrine (LEVOPHED) 8 mg in 5% dextrose 250mL (32 mcg/mL) infusion  0.5-16 mcg/min IntraVENous TITRATE 5 mcg/min at 04/25/21 1156 Case discussed with: Nursing and daughter at bedside Antonoi Coffee, DO

## 2021-04-25 NOTE — PROGRESS NOTES
Kai Naidu [facility] 
[address] 
[phone] Medical Progress Note NAME: Neo Quinteros :  1931 MRM:  [id number] Date/Time of service: 2021  8:36 AM 
 
  
Subjective: Chief Complaint:  Patient was personally seen and examined by me during this time period. Chart reviewed. Remains intubated and sedated. Remains on levophed. Unable to obtain review systems due to intubation and sedation. Objective:  
 
 
Vitals:  
 
 
Last 24hrs VS reviewed since prior progress note. Most recent are: 
 
Visit Vitals BP (!) 108/45 (BP 1 Location: Left upper arm, BP Patient Position: At rest) Pulse 60 Temp 100 °F (37.8 °C) Resp 22 Ht 5' 7\" (1.702 m) Wt 90.7 kg (199 lb 15.3 oz) SpO2 96% BMI 31.32 kg/m² SpO2 Readings from Last 6 Encounters:  
21 96% 07/10/19 95% 18 93% 18 96% 18 97% 18 98% O2 Flow Rate (L/min): 10 l/min Intake/Output Summary (Last 24 hours) at 2021 4272 Last data filed at 2021 0800 Gross per 24 hour Intake 3069.7 ml Output 1012 ml Net 2057.7 ml  
  
 
Exam:  
 
Physical Exam: 
 
Gen: Intubated HEENT: nontraumatic Resp:  No accessory muscle use, rhonchi b/l  
Card:  Systolic murmur, paced, b/l peripheral edema Abd:  Soft, non-tender, non-distended, normoactive bowel sounds are present Musc:  No cyanosis or clubbing Skin: Diffuse ecchymosis Neuro: Sedated Psych: Sedated Medications Reviewed: (see below) Lab Data Reviewed: (see below) 
 
______________________________________________________________________ Medications:  
 
Current Facility-Administered Medications Medication Dose Route Frequency  albuterol-ipratropium (DUO-NEB) 2.5 MG-0.5 MG/3 ML  3 mL Nebulization Q6H RT  
 midazolam (VERSED) injection 1 mg  1 mg IntraVENous Q2H PRN  
 cefTRIAXone (ROCEPHIN) 2 g in sterile water (preservative free) 20 mL IV syringe  2 g IntraVENous Q24H  
 DAPTOmycin (CUBICIN) 550 mg in 0.9% sodium chloride 11 mL IV Syringe  550 mg IntraVENous Q24H  
 famotidine (PF) (PEPCID) 20 mg in 0.9% sodium chloride 10 mL injection  20 mg IntraVENous Q24H  
 lactated Ringers infusion  75 mL/hr IntraVENous CONTINUOUS  
 heparin (porcine) injection 5,000 Units  5,000 Units SubCUTAneous Q8H  
 fentaNYL citrate (PF) injection 50 mcg  50 mcg IntraVENous Q1H PRN  propofol (DIPRIVAN) 10 mg/mL infusion  0-50 mcg/kg/min IntraVENous TITRATE  sodium chloride (NS) flush 5-40 mL  5-40 mL IntraVENous Q8H  
 sodium chloride (NS) flush 5-40 mL  5-40 mL IntraVENous PRN  
 acetaminophen (TYLENOL) tablet 650 mg  650 mg Oral Q6H PRN Or  
 acetaminophen (TYLENOL) suppository 650 mg  650 mg Rectal Q6H PRN  polyethylene glycol (MIRALAX) packet 17 g  17 g Oral DAILY PRN  promethazine (PHENERGAN) tablet 12.5 mg  12.5 mg Oral Q6H PRN Or  
 ondansetron (ZOFRAN) injection 4 mg  4 mg IntraVENous Q6H PRN  
 NOREPINephrine (LEVOPHED) 8 mg in 5% dextrose 250mL (32 mcg/mL) infusion  0.5-16 mcg/min IntraVENous TITRATE Lab Review:  
 
Recent Labs  
  04/25/21 
0419 04/24/21 
0511 04/23/21 
2910 WBC 7.6 8.0 7.9 HGB 9.5* 9.8* 9.9*  
HCT 29.8* 31.5* 30.8*  193 191 Recent Labs  
  04/24/21 
0820 04/23/21 
1680  137  
K 4.3 4.2  107 CO2 21 22 * 114* BUN 38* 45* CREA 1.50* 1.72* CA 8.5 9.0 MG  --  2.4 ALB 2.5*  --   
TBILI 0.4  --   
ALT 34  -- Lab Results Component Value Date/Time Glucose (POC) 116 (H) 08/05/2014 08:59 PM  
 
 
  
Assessment / Plan:  
 
Acute respiratory failure 
-Likely due to cardiac and infectious etiologies  
-Remains intubated -Vent management per intensivist 
 
Cardiopulmonary arrest/Multiple arrhythmias 
-Status post complete heart block, ventricular fibrillation with asystole and then aflutter 
-remains transcutaneously paced  
- etiology for heart block likely endocarditis  
-Cardiology evaluating for possible pacemaker, will also need RAMAN due to concern for endocarditis Shock  
-likely cardiogenic and septic  
-c/w levophed, wean as able Bacteremia - persistent  
-concern for endocarditis - streptococcus 4/22, follow repeat blood cx  
- Concern for pneumonia but low suspicion 
- also concern for bilary source, obtain ABD US  
- ct abd pelvis w/ no obvious source of metastatic/occult abscesses  
-Continue high dose ceftriaxone and daptomyocin  
-ID following Gallbladder thickening - obtain abd US  
-monitor BARAK/CKD 
-improving  
-Likely due to hypoperfusion due to arrest and hypotension 
--c/w IVF  
-Avoid nephrotoxins 
-Monitor renal function Elevated troponins 
-Likely due to arrest and defibrillation  
-Status post cath no remarkable coronary disease 
-Cardiology following Total time spent with patient: 40  Minutes **I personally saw and examined the patient during this time period** Care Plan discussed with: Nursing Staff and Consultant/Specialist 
 
Discussed:  Code Status and Care Plan Prophylaxis:  Hep SQ Disposition:  [facility]/Rehab 
        
___________________________________________________ Attending Physician: Fiona Cannon DO

## 2021-04-25 NOTE — PROGRESS NOTES
Critical Care Progress Note Date:2021       Room:[address] [place] [address] Patient Margoth Foster     YOB: 1931     Age:89 y.o. Subjective Subjective:  
 
: No major events overnight. Sedated on propofol. On Levophed @6. Paced @ 60. On vent support: 20/400/40/8. CXR stable AB.33/39/88. Cr down to 1.4 UO:1 L On Tf @ 30,goal of 50 and tolerating. Hb and Plts stable T max: 100. Bcx  :with STREPTOCOCCUS SANGUINIS. sensitivity noted. Repeat Bcx sent. I/O=3/3/1.0=+2.3L   
 
: No major events overnight. Sedated on propofol due to periods of agitation. Receiving Fentanyl PRN. On Levophed. LA 0.8 On Vent support: PRVC 20/400/40/8. AB.4/31/86. CXR stable. Cr  Trending down to 1.5 UO: 1L Tolerating TF. Hb  Stable 9.8 Plts stable 193 WBCs normal,Febrile (102) on ice pack. Repeat CX sent. ID consulted,Daptomycin added. I/O=2.3/1.0=+1.3L 
 
: Sedated on propofol. CT head:no acute process. On low dose of Levophed. BNP 8800. Pacemaker in place. On VCV: 20/500/60/8. Cr down from 2.17 to 1.72 UO: 0.7L NPO Hb down from 10.4 to 9.9 Plts stable 191 T max: 102.1 ( @ 21:00), T :100.2 this morning. WBCs normal. I/O=+1.5L Review of Systems: Unable to obtain given sedation Objective Vitals Last 24 Hours: TEMPERATURE:  Temp  Av.3 °F (37.4 °C)  Min: 98.5 °F (36.9 °C)  Max: 100 °F (37.8 °C) RESPIRATIONS RANGE: Resp  Av.4  Min: 17  Max: 49 PULSE OXIMETRY RANGE: SpO2  Av.4 %  Min: 93 %  Max: 100 % PULSE RANGE: Pulse  Av.2  Min: 60  Max: 66 
BLOOD PRESSURE RANGE: Systolic (83DWN), LDA:809 , Min:87 , KGA:380  
; Diastolic (28EAO), PMA:18, Min:44, Max:110 I/O (24Hr): Intake/Output Summary (Last 24 hours) at 2021 1047 Last data filed at 2021 1000 Gross per 24 hour Intake 3296.7 ml Output 1077 ml Net 2219.7 ml Physical Exam: 
 
 
I examined the patient via telemedicine, with its associated limitations.   I reviewed the electronic medical record, the x-rays, labs, progress notes, previous history and physicals and consultation notes that were available in the electronic medical record. I beamed in and examined the patient with assistance of house staff On exam: 
 
Sedated Intubated S1,S2 Decreased breath sounds bilateral at bases Abdomen soft Introducer in groin Objective: 
Vital signs: (most recent): Blood pressure (!) 153/56, pulse 60, temperature 100 °F (37.8 °C), resp. rate 27, height 5' 7\" (1.702 m), weight 90.7 kg (199 lb 15.3 oz), SpO2 97 %. Labs/Imaging/Diagnostics Labs: CBC: 
Recent Labs  
  04/25/21 
0419 04/24/21 
0511 04/23/21 
7752 WBC 7.6 8.0 7.9  
RBC 3.23* 3.36* 3.35* HGB 9.5* 9.8* 9.9*  
HCT 29.8* 31.5* 30.8* MCV 92.3 93.8 91.9  
RDW 13.9 13.7 13.9  193 191 CHEMISTRIES: 
Recent Labs  
  04/25/21 
0909 04/24/21 
0820 04/23/21 
9176 * 136 137  
K 4.5 4.3 4.2  108 107 CO2 22 21 22 BUN 38* 38* 45*  
CA 8.2* 8.5 9.0 MG  --   --  2.4 PT/INR: 
No results for input(s): INR, INREXT, INREXT in the last 72 hours. No lab exists for component: PROTIME APTT: 
No results for input(s): APTT in the last 72 hours. LIVER PROFILE: 
Recent Labs  
  04/25/21 
0909 04/24/21 
0820 AST 38* 30 ALT 39 34 Lab Results Component Value Date/Time ALT (SGPT) 39 04/25/2021 09:09 AM  
 AST (SGOT) 38 (H) 04/25/2021 09:09 AM  
 Alk. phosphatase 154 (H) 04/25/2021 09:09 AM  
 Bilirubin, total 0.2 04/25/2021 09:09 AM  
 
 
Imaging Last 24 Hours: 
Ct Chest Wo Cont Result Date: 4/24/2021 INDICATION: Infection source COMPARISON STUDY: Chest radiograph of earlier today, CT abdomen and pelvis of 11/26/2018 TECHNIQUE: Noncontrast axial images were obtained through the chest, abdomen, and pelvis. Oral contrast was  administered. Coronal and sagittal reformatted images were generated.  CT dose reduction was achieved through use of a standardized protocol tailored for this examination and automatic exposure control for dose modulation. CT CHEST: THYROID: Small in size. No nodule. Ulus Reusing MEDIASTINUM/ABELINO: No mass or lymphadenopathy. HEART/PERICARDIUM: Normal in size. Coronary artery calcification. Mitral annulus and aortic valve calcification. Right ventricular cardiac pacer inserted via the right femoral approach. . VESSELS: Densely calcified aortic arch. No aortic aneurysm. LUNGS: Mild atelectasis adjacent to the bibasilar pleural effusions. Lungs otherwise clear. No suspicious nodule or mass. Appropriate ET tube position. Ulus Reusing PLEURA: Small bilateral pleural effusions and adjacent consolidation. . CHEST WALL: No significant abnormality. CT ABDOMEN AND PELVIS: LIVER: No mass or biliary dilatation. GALLBLADDER: Layer of dense material in the dependent portion of the gallbladder likely representing layered stones. Mild pericholecystic fluid. No biliary dilatation. SPLEEN: Normal size. No focal lesion. PANCREAS: No mass or ductal dilatation. ADRENALS: Unremarkable. KIDNEYS: No calculi, hydronephrosis, or suspicious focal lesions. Large fluid density left renal cyst which appears simple and does not require additional evaluation. Unchanged since 2018. STOMACH: Unremarkable. SMALL BOWEL: No dilatation or wall thickening. COLON: No dilatation or wall thickening. Diverticulosis. APPENDIX: Nondistended. PERITONEUM: No ascites or pneumoperitoneum. RETROPERITONEUM: Calcified, atherosclerotic aorta without aneurysm. No adenopathy. REPRODUCTIVE ORGANS: Moderate prostatic enlargement. URINARY BLADDER: Drained by a catheter. PELVIC LYMPH NODES: None enlarged. BONES: No destructive osseous lesions. Postsurgical and degenerative changes in the lumbar spine. . ADDITIONAL COMMENTS:  N/A  
 
1. Dependent volume loss in the lung bases and small bilateral pleural effusions. Lungs otherwise clear. 2. Dense material in the dependent portion of the gallbladder consistent with layered stones or dense sludge.  Mild gallbladder wall thickening. Ultrasound recommended for further evaluation. No biliary dilatation. 3. Unchanged large left renal cyst. No calculi or hydronephrosis. 4. Colonic diverticulosis without CT evidence of acute diverticulitis. Ct Abd Pelv Wo Cont Result Date: 4/24/2021 INDICATION: Infection source COMPARISON STUDY: Chest radiograph of earlier today, CT abdomen and pelvis of 11/26/2018 TECHNIQUE: Noncontrast axial images were obtained through the chest, abdomen, and pelvis. Oral contrast was  administered. Coronal and sagittal reformatted images were generated. CT dose reduction was achieved through use of a standardized protocol tailored for this examination and automatic exposure control for dose modulation. CT CHEST: THYROID: Small in size. No nodule. Lysbeth Carrel MEDIASTINUM/ABELINO: No mass or lymphadenopathy. HEART/PERICARDIUM: Normal in size. Coronary artery calcification. Mitral annulus and aortic valve calcification. Right ventricular cardiac pacer inserted via the right femoral approach. . VESSELS: Densely calcified aortic arch. No aortic aneurysm. LUNGS: Mild atelectasis adjacent to the bibasilar pleural effusions. Lungs otherwise clear. No suspicious nodule or mass. Appropriate ET tube position. Lysbeth Carrel PLEURA: Small bilateral pleural effusions and adjacent consolidation. . CHEST WALL: No significant abnormality. CT ABDOMEN AND PELVIS: LIVER: No mass or biliary dilatation. GALLBLADDER: Layer of dense material in the dependent portion of the gallbladder likely representing layered stones. Mild pericholecystic fluid. No biliary dilatation. SPLEEN: Normal size. No focal lesion. PANCREAS: No mass or ductal dilatation. ADRENALS: Unremarkable. KIDNEYS: No calculi, hydronephrosis, or suspicious focal lesions. Large fluid density left renal cyst which appears simple and does not require additional evaluation. Unchanged since 2018. STOMACH: Unremarkable. SMALL BOWEL: No dilatation or wall thickening.  COLON: No dilatation or wall thickening. Diverticulosis. APPENDIX: Nondistended. PERITONEUM: No ascites or pneumoperitoneum. RETROPERITONEUM: Calcified, atherosclerotic aorta without aneurysm. No adenopathy. REPRODUCTIVE ORGANS: Moderate prostatic enlargement. URINARY BLADDER: Drained by a catheter. PELVIC LYMPH NODES: None enlarged. BONES: No destructive osseous lesions. Postsurgical and degenerative changes in the lumbar spine. . ADDITIONAL COMMENTS:  N/A  
 
1. Dependent volume loss in the lung bases and small bilateral pleural effusions. Lungs otherwise clear. 2. Dense material in the dependent portion of the gallbladder consistent with layered stones or dense sludge. Mild gallbladder wall thickening. Ultrasound recommended for further evaluation. No biliary dilatation. 3. Unchanged large left renal cyst. No calculi or hydronephrosis. 4. Colonic diverticulosis without CT evidence of acute diverticulitis. Us Abd Comp Result Date: 4/25/2021 EXAM: US ABD COMP INDICATION: Abnormal gallbladder on recent prior CT scan. COMPARISON: CT yesterday. TECHNIQUE: Real-time sonography of the abdomen was performed with multiple static images of the liver, gallbladder, pancreas, spleen, kidneys and retroperitoneum obtained. FINDINGS: LIVER: The liver is grossly normal, with no mass or other focal abnormality. LIVER VASCULATURE: The portal vein flow is within normal limits. GALLBLADDER: The gallbladder contains no stones. The wall is thickened, up to 6 mm, and there is pericholecystic fluid. Sonographic Bethany Huddle sign is absent. COMMON BILE DUCT: There is no biliary duct dilatation and the common duct measures 5 mm in diameter. PANCREAS: Not visualized due to bowel gas. SPLEEN: The spleen is normal in echotexture and size and measures 11.4 cm in length. RIGHT KIDNEY: The right kidney demonstrates diffusely increased echogenicity with no mass, stone or hydronephrosis. The right kidney measures 11.1 cm in length.  LEFT KIDNEY: The left kidney demonstrates diffusely increased echogenicity with an 8.5 cm left renal cyst. The left kidney measures 12.5 cm in length. RETROPERITONEUM: The aorta tapers normally. The IVC is normal. A right pleural effusion is noted. 1. Gallbladder wall thickening and pericholecystic fluid. No evidence of gallstones. Findings are nonspecific, but could be due to acalculous cholecystitis or secondary to other conditions such as liver disease or hypoproteinemia. 2. Chronic medical renal disease. 3. Large left renal cyst. 4. No biliary ductal dilatation. Xr Chest [facility] Result Date: 4/25/2021 Clinical history: fever INDICATION:   fever COMPARISON: 4/21/2021 FINDINGS: AP portable upright view of the chest demonstrates a stable  cardiopericardial silhouette. Bibasilar atelectasis and effusion. ET tube and NG tube are stable. Small bilateral effusions. . Patient is on a cardiac monitor. No significant change. Assessment//Plan Active Problems: Hypertension (8/1/2013) Hyperlipidemia (8/1/2013) BPH (benign prostatic hyperplasia) (8/1/2013) Hypoxia (4/21/2021) Third degree heart block ([address] (4/21/2021) Bradycardia (4/21/2021) Elevated troponin (4/21/2021) BARAK (acute kidney injury) ([address] (4/21/2021) Cardiogenic shock ([address] (4/21/2021) Asystole ([address] (4/21/2021) Ventricular fibrillation ([address] (4/21/2021) Assessment & Plan Assessment:  
Sp Cardiac Arrest with ROSC Acute Resp Failure CHB sp temporary Pacemaker BARAK Shock,circulatory Bacteremia: GPC in chains Plan: 
Wean Propofol as tolerated. Versed PRN and.Fentyanyl PRN Paced. Cardiology following. Wean Levophed as tolerated. Potential plan for RAMAN. Will need PPM once bacteremia resolved and cleared by ID. Vent support. Adjust RR and TV based on ABG. O2 and PEEP to keep Sat>92%. Unable to proceed with PSV given agitation once sedation weaned and risk of pacemaker dislodgement from groin. Follwo ABG and CXR. U Monitor UO and renal indices. Continue TF. Bowel regimen Monitor H/H and Plts ON ceftriaxone and Daptomycin. Consider de-escalation,Defer to ID. Follow repeat BCx 04/24. Will likely need CT A/P and RAMAN. Monitor I/O. Continue LR infusion SQ Heparin/Pepcid Poor prognosis 04/24:Discussed with daughter at bedside. I performed all aspects of the physical examination via Telemedicine associated with two way audio and video communication and with the on-site assistance of  the bedside nurse. I am located in [facility] and the patient is located in [facility] at [address] [place] [address]. The patient is critically ill in the ICU. I  personally  reviewed the pertinent medical records, laboratory/ pathology data and radiographic images. The decision making regarding this patient is as documented above, which was generated  following  discussion  with the multidisciplinary ICU team and creation of a treatment plan for  the patient. We discussed the patient's interval history and future coordination of care and  plans. The patient's medications  were reviewed and changes made as stipulated above. Due to  critical illness impairing one or more vital organs of this patient resulting in life threatening clinical situation  I have provided direct, frequent personal  assessment and manipulation in management plan and spent 35 minutes  of  critical care time excluding the time spent on procedures and teaching. Greater than 50% of this time  in patients care was  employed  in counseling and coordination of care and engaged in face to face discussion of case management issues, addressing questions, and outlining a plan of  therapy. Pt at risk of life threatening deterioration from circulatory shock. Pt seen and evaluated via tele encounter. Audio and Video were used for this interaction.  
 
 
Electronically signed by Lara Miller MD on 4/25/2021 at 12:58 PM

## 2021-04-25 NOTE — PROGRESS NOTES
Problem: Non-Violent Restraints Goal: Removal from restraints as soon as assessed to be safe Outcome: Progressing Towards Goal 
Goal: No harm/injury to patient while restraints in use Outcome: Progressing Towards Goal 
Goal: Patient's dignity will be maintained Outcome: Progressing Towards Goal 
Goal: Patient Interventions Outcome: Progressing Towards Goal 
  
Problem: Ventilator Management Goal: *Adequate oxygenation and ventilation Outcome: Progressing Towards Goal 
Goal: *Patient maintains clear airway/free of aspiration Outcome: Progressing Towards Goal 
Goal: *Absence of infection signs and symptoms Outcome: Progressing Towards Goal 
Goal: *Normal spontaneous ventilation Outcome: Progressing Towards Goal 
  
Problem: Patient Education: Go to Patient Education Activity Goal: Patient/Family Education Outcome: Progressing Towards Goal 
  
Problem: Falls - Risk of 
Goal: *Absence of Falls Description: Document Anna Couch Fall Risk and appropriate interventions in the flowsheet. Outcome: Progressing Towards Goal 
Note: Fall Risk Interventions: 
  
 
Mentation Interventions: Adequate sleep, hydration, pain control, Bed/chair exit alarm, Update white board Medication Interventions: Bed/chair exit alarm Elimination Interventions: Bed/chair exit alarm, Call light in reach, Patient to call for help with toileting needs History of Falls Interventions: Bed/chair exit alarm, Consult care management for discharge planning, Door open when patient unattended Problem: Patient Education: Go to Patient Education Activity Goal: Patient/Family Education Outcome: Progressing Towards Goal 
  
Problem: Pressure Injury - Risk of 
Goal: *Prevention of pressure injury Description: Document Fabio Scale and appropriate interventions in the flowsheet.  
Outcome: Progressing Towards Goal 
Note: Pressure Injury Interventions: 
Sensory Interventions: Assess changes in LOC, Assess need for specialty bed, Keep linens dry and wrinkle-free, Maintain/enhance activity level, Minimize linen layers, Turn and reposition approx. every two hours (pillows and wedges if needed) Moisture Interventions: Absorbent underpads, Apply protective barrier, creams and emollients, Assess need for specialty bed, Minimize layers, Moisture barrier Activity Interventions: Assess need for specialty bed, PT/OT evaluation Mobility Interventions: Assess need for specialty bed, Pressure redistribution bed/mattress (bed type), Turn and reposition approx. every two hours(pillow and wedges) Nutrition Interventions: Document food/fluid/supplement intake, Discuss nutritional consult with provider Friction and Shear Interventions: Apply protective barrier, creams and emollients, Minimize layers Problem: Patient Education: Go to Patient Education Activity Goal: Patient/Family Education Outcome: Progressing Towards Goal

## 2021-04-26 NOTE — PROGRESS NOTES
visited Mr. Marley Sacks for a palliative care follow up visit in the ICU. . Marley Sacks is intubated and appeared to be resting comfortably. His daughter, Faby Carrera, was sitting next to the bedside. She made good eye contact and greeted the  warmly, remembering her from a previous visit.  was a listening ear as Faby Carrrea shared about her father's health concerns, the uncertainty of what is going on with him, and the difficulty of the waiting process. She does not believe is doing  well at this time.  continued to listen Leeann's feelings, concerns, and frustrations. Faby Carrera thanked the  for her visit and expressed no additional needs at this time. 's are available for further support upon referral 
Princess Gerber.  Paging Service: [phone])

## 2021-04-26 NOTE — PROGRESS NOTES
Cardiology Progress Note [address], [place], [address] [facility] Phone [phone]; Fax [phone] 
 
 
 
2021 10:10 AM  
 
Admit Date:           2021 Admit Diagnosis:  Third degree heart block ([facility] [address] [I44.2] Bradycardia [R00.1] Elevated troponin [R77.8] BARAK (acute kidney injury) ([facility] [address] [N17.9] :          1931 MRN:          [id number] Impression Plan/Recommendation 1. Acute resp failure- intubated 2. S/p cardiac arrest- bradycardia/ asystole 3. PAFlutter 4. CHB- temporary pacemaker in situ, underlying rhythm in junctional escape in the  5. BARAK/CKD- creatinine trending down 6. Anemia · Blood cultures + for Streptococcus sanguinous (viridans streptococci) isolated from  blood cultures- plans for MITZY at 1130 at bedside. NPO. · Noted on low dose of madelaine · Further recommendations after results of MITZY · Echo shows pEF, cath w non obstructive CAD Reviewed MITZY w his daughter at bedside Reviewed above w RN, Dr Jose Pizarro  07 - 1900 In: 541.3 [I.V.:291.3] Out: 105 [Urine:105] Last 3 Recorded Weights in this Encounter 21 1826 21 1052 Weight: 200 lb (90.7 kg) 199 lb 15.3 oz (90.7 kg) 1901 -  0700 In: 4889.9 [I.V.:3829.9] Out: Herman Smiling [YDPIR:[id number]] SUBJECTIVE Erla Aristides patient intubated and sedated Daughter at bedside - agreeable to mitzy Current Facility-Administered Medications Medication Dose Route Frequency  NOREPINephrine (LEVOPHED) 8 mg in 5% dextrose 250mL (32 mcg/mL) infusion  0.5-16 mcg/min IntraVENous TITRATE  metroNIDAZOLE (FLAGYL) IVPB premix 500 mg  500 mg IntraVENous Q12H  
 midazolam in normal saline (VERSED) 1 mg/mL infusion  0-10 mg/hr IntraVENous TITRATE  albuterol-ipratropium (DUO-NEB) 2.5 MG-0.5 MG/3 ML  3 mL Nebulization Q6H RT  
 midazolam (VERSED) injection 1 mg  1 mg IntraVENous Q2H PRN  
 cefTRIAXone (ROCEPHIN) 2 g in sterile water (preservative free) 20 mL IV syringe  2 g IntraVENous Q24H  
 famotidine (PF) (PEPCID) 20 mg in 0.9% sodium chloride 10 mL injection  20 mg IntraVENous Q24H  
 heparin (porcine) injection 5,000 Units  5,000 Units SubCUTAneous Q8H  
 fentaNYL citrate (PF) injection 50 mcg  50 mcg IntraVENous Q1H PRN  
 sodium chloride (NS) flush 5-40 mL  5-40 mL IntraVENous Q8H  
 sodium chloride (NS) flush 5-40 mL  5-40 mL IntraVENous PRN  
 acetaminophen (TYLENOL) tablet 650 mg  650 mg Oral Q6H PRN Or  
 acetaminophen (TYLENOL) suppository 650 mg  650 mg Rectal Q6H PRN  polyethylene glycol (MIRALAX) packet 17 g  17 g Oral DAILY PRN  promethazine (PHENERGAN) tablet 12.5 mg  12.5 mg Oral Q6H PRN Or  
 ondansetron (ZOFRAN) injection 4 mg  4 mg IntraVENous Q6H PRN OBJECTIVE Intake/Output Summary (Last 24 hours) at 4/26/2021 1135 Last data filed at 4/26/2021 1000 Gross per 24 hour Intake 3195.99 ml Output 1175 ml Net 2020.99 ml Review of Systems - History obtained from the patient AS PER  \[facility]\"" Telemetry  60 underlying rhythm junctional escape 60's PHYSICAL EXAM  
  
 
Visit Vitals BP (!) 126/43 Pulse 60 Temp 100.3 °F (37.9 °C) Resp (!) 31 Ht 5' 7\" (1.702 m) Wt 199 lb 15.3 oz (90.7 kg) SpO2 95% BMI 31.32 kg/m² Gen: Well-developed, elderly, pale, in no acute distress- intubated and sedated HEENT:  Pink conjunctivae, No scleral icterus or conjunctival, moist mucous membranes. OG tube/ETT Neck: Supple,No JVD Resp: No accessory muscle use, Clear breath sounds- anterior-  
Card: Regular Rate,Rythm,2/6 murmur , no rubs or gallop. No thrills. GI:           non-tender , rounded, mildly firm MSK: No cyanosis or clubbing Skin: No rashes or ulcers, pale. Temp pacer in groin- intact Neuro: Sedated, grimaces to pain stimuli Psych:  [place] 
LE: +2 BLE edema- sami boots DATA REVIEW Laboratory and Imaging have been reviewed by me and are notable for Recent Labs  
  04/24/21 
0820 CPK 38* Recent Labs  
  04/25/21 
4630 04/25/21 
0419 04/24/21 
0820 04/24/21 
0154 *  --  136  --   
K 4.5  --  4.3  --   
CO2 22  --  21  --   
BUN 38*  --  38*  --   
CREA 1.40*  --  1.50*  --   
*  --  109*  --   
WBC  --  7.6  --  8.0 HGB  --  9.5*  --  9.8* HCT  --  29.8*  --  31.5* PLT  --  225  --  193 Jan Rosen, ASIF

## 2021-04-26 NOTE — PROGRESS NOTES
Critical Care Progress Note Date:2021       Room:[address] [place] [address] Patient Oumar Haynes     YOB: 1931     Age:89 y.o. Subjective Subjective:  
:  No acute events overnight. TVP at 60 with 100% capture. On levophed and versed. Propofol is off. On TF tolerating well.  
 
: No major events overnight. Sedated on propofol. On Levophed @6. Paced @ 60. On vent support: 20/400/40/8. CXR stable AB.33//. Cr down to 1.4 UO:1 L On Tf @ 30,goal of 50 and tolerating. Hb and Plts stable T max: 100. Bcx  :with STREPTOCOCCUS SANGUINIS. sensitivity noted. Repeat Bcx sent. I/O=3/3/1.0=+2.3L   
 
: No major events overnight. Sedated on propofol due to periods of agitation. Receiving Fentanyl PRN. On Levophed. LA 0.8 On Vent support: PRVC 20/400/40/8. AB.4/. CXR stable. Cr  Trending down to 1.5 UO: 1L Tolerating TF. Hb  Stable 9.8 Plts stable 193 WBCs normal,Febrile (102) on ice pack. Repeat CX sent. ID consulted,Daptomycin added. I/O=2.3/1.0=+1.3L 
 
: Sedated on propofol. CT head:no acute process. On low dose of Levophed. BNP 8800. Pacemaker in place. On VCV: 20/500/60/8. Cr down from 2.17 to 1.72 UO: 0.7L NPO Hb down from 10.4 to 9.9 Plts stable 191 T max: 102.1 ( @ 21:00), T :100.2 this morning. WBCs normal. I/O=+1.5L Review of Systems: Unable to obtain given sedation Objective Vitals Last 24 Hours: TEMPERATURE:  Temp  Av.6 °F (38.1 °C)  Min: 99.3 °F (37.4 °C)  Max: 102.3 °F (39.1 °C) RESPIRATIONS RANGE: Resp  Av.2  Min: 18  Max: 48 PULSE OXIMETRY RANGE: SpO2  Av.4 %  Min: 95 %  Max: 100 % PULSE RANGE: Pulse  Av.1  Min: 55  Max: 72 BLOOD PRESSURE RANGE: Systolic (06MNA), CELESTINE:105 , Min:91 , LXE:153  
; Diastolic (99VBR), JID:37, Min:34, Max:82 I/O (24Hr): Intake/Output Summary (Last 24 hours) at 2021 5329 Last data filed at 2021 0800 Gross per 24 hour Intake 2995.24 ml Output 1335 ml Net 1660.24 ml Physical Exam: 
 
 
I examined the patient via telemedicine, with its associated limitations. I reviewed the electronic medical record, the x-rays, labs, progress notes, previous history and physicals and consultation notes that were available in the electronic medical record. I beamed in and examined the patient with assistance of house staff On exam: 
 
Sedated Intubated SR on monitor Decreased breath sounds bilateral at bases Abdomen soft Introducer in groin Objective: 
Vital signs: (most recent): Blood pressure (!) 126/51, pulse 60, temperature 100.3 °F (37.9 °C), resp. rate 28, height 5' 7\" (1.702 m), weight 90.7 kg (199 lb 15.3 oz), SpO2 100 %. Labs/Imaging/Diagnostics Labs: CBC: 
Recent Labs  
  04/25/21 
0419 04/24/21 
0511 WBC 7.6 8.0  
RBC 3.23* 3.36* HGB 9.5* 9.8* HCT 29.8* 31.5* MCV 92.3 93.8  
RDW 13.9 13.7  193 CHEMISTRIES: 
Recent Labs  
  04/25/21 
0909 04/24/21 
0820 * 136  
K 4.5 4.3  108 CO2 22 21 BUN 38* 38* CA 8.2* 8.5 PT/INR: 
No results for input(s): INR, INREXT, INREXT in the last 72 hours. No lab exists for component: PROTIME APTT: 
No results for input(s): APTT in the last 72 hours. LIVER PROFILE: 
Recent Labs  
  04/25/21 
0909 04/24/21 
0820 AST 38* 30 ALT 39 34 Lab Results Component Value Date/Time ALT (SGPT) 39 04/25/2021 09:09 AM  
 AST (SGOT) 38 (H) 04/25/2021 09:09 AM  
 Alk. phosphatase 154 (H) 04/25/2021 09:09 AM  
 Bilirubin, total 0.2 04/25/2021 09:09 AM  
 
 
Imaging Last 24 Hours: 
Xr Chest Im [address] Result Date: 4/26/2021 Clinical history: fever INDICATION:   fever COMPARISON: 4/25/2021 FINDINGS: AP portable upright view of the chest demonstrates a stable  cardiopericardial silhouette. Bibasilar atelectasis and effusion. ET tube and NG tube are stable. . Patient is on a cardiac monitor. No significant change. Xr Abd Port  1 V Result Date: 4/26/2021 CLINICAL HISTORY: Evaluate NG tube/OG tube INDICATION: Evaluate NG/OG tube COMPARISON: 4/25/2021 FINDINGS: AP Limited supine view of the abdomen is obtained. The OG/NG tube tip lies in appropriate position below the diaphragm. Otherwise no significant interval change or acute findings. Gastric tube tip is in appropriate position for use. Assessment//Plan Active Problems: Hypertension (8/1/2013) Hyperlipidemia (8/1/2013) BPH (benign prostatic hyperplasia) (8/1/2013) Hypoxia (4/21/2021) Third degree heart block ([address] (4/21/2021) Bradycardia (4/21/2021) Elevated troponin (4/21/2021) BARAK (acute kidney injury) ([address] (4/21/2021) Cardiogenic shock ([address] (4/21/2021) Asystole ([address] (4/21/2021) Ventricular fibrillation ([address] (4/21/2021) Assessment & Plan Assessment:  
Sp Cardiac Arrest with ROSC Acute Resp Failure with hypoxia CHB s/p temporary Pacemaker BARAK Shock,circulatory Bacteremia: viridans strep Plan: 
Propofol is off. Continue versed and Fentyanyl PRN Paced. Cardiology following. Wean Levophed as tolerated for goal SBP>100. RAMAN today at 10:30. Will need PPM once bacteremia resolved and cleared by ID. Vent support. Adjust RR and TV based on ABG. O2 and PEEP to keep Sat>92%. Unable to proceed with PSV given agitation once sedation weaned and risk of pacemaker dislodgement from groin. Follwo ABG and CXR. Monitor UO and renal indices. Continue TF. Bowel regimen Monitor H/H and Plts On ceftriaxone. ID following D/C LR. Lasix 40 mg IV x1. 
 
SQ Heparin/Pepcid Poor prognosis I performed all aspects of the physical examination via Telemedicine associated with two way audio and video communication and with the on-site assistance of  the bedside nurse. I am located in [place] and the patient is located in [facility]. The patient is critically ill in the ICU.    I  personally  reviewed the pertinent medical records, laboratory/ pathology data and radiographic images. The decision making regarding this patient is as documented above, which was generated  following  discussion  with the multidisciplinary ICU team and creation of a treatment plan for  the patient. We discussed the patient's interval history and future coordination of care and  plans. The patient's medications  were reviewed and changes made as stipulated above. Due to  critical illness impairing one or more vital organs of this patient resulting in life threatening clinical situation  I have provided direct, frequent personal  assessment and manipulation in management plan and spent 35 minutes  of  critical care time excluding the time spent on procedures and teaching. Greater than 50% of this time  in patients care was  employed  in counseling and coordination of care and engaged in face to face discussion of case management issues, addressing questions, and outlining a plan of  therapy. Pt at risk of life threatening deterioration from circulatory shock. Pt seen and evaluated via tele encounter. Audio and Video were used for this interaction.  
 
 
Electronically signed by Jason Morris DO on 4/26/2021 at 12:58 PM 
 No

## 2021-04-26 NOTE — PROGRESS NOTES
[facility] Progress Note Colton Valerio DO 
  [phone] Office [phone]  Fax 2021 Assessment & Plan:  
· Streptococcus sanguinous (viridans streptococci) isolated from / blood cultures. 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 RAMAN planned for this afternoon. Repeat blood cultures NGSF. Suspect dental source of infection however per patient's daughter patient had dental exam within the last several months which was unremarkable · Fever. Etiology unclear. Repeat blood cultures NGSF. UA bland CT C-A-P relatively unremarkable with exception of gallbladder. No obvious source of infection or occult abscesses on imaging. May need to change transvenous pacer device if blood cultures do not clear. · Abnormal gallbladder on CT. Ultrasound reveals thickened gallbladder wall with pericholecystic fluid . With ongoing unexplained fevers and abnormal LFTs acalculous cholecystitis is in the differential.  Consider general surgery evaluation. Add anaerobic coverage · Third-degree heart block. Temporary transvenous pacemaker emergently placed . Patient will need dual-chamber PPM per cardiology. I am concerned for endocarditis as outlined above. Await RAMAN. Timing of PPM not yet determined but will need sterile blood cultures and will need to rule out endocarditis prior to placement · BARAK/CKD Subjective: Intubated and sedated. Remains febrile Objective:  
 
Vitals:  
Visit Vitals BP (!) 139/47 Pulse 60 Temp 100.3 °F (37.9 °C) Resp 25 Ht 5' 7\" (1.702 m) Wt 199 lb 15.3 oz (90.7 kg) SpO2 94% BMI 31.32 kg/m² Tmax:  Temp (24hrs), Av.6 °F (38.1 °C), Min:99.3 °F (37.4 °C), Max:102.3 °F (39.1 °C) Exam:  
Patient is intubated:  no 
 
Physical Examination:  
General:   Sedated on vent Head:  Normocephalic, atraumatic. Eyes:  Conjunctivae clear Neck: Supple Lungs:   No distress. Clear to auscultation anteriorly Chest wall:    
Heart:  Regular rate and rhythm. 4/6 BRIAN Abdomen:   Soft, non-tender, non-distended Extremities:  No edema. Right femoral vein transvenous pacer site unremarkable Skin:  No rash Neurologic:  Unable to assess Labs:     
 
No lab exists for component: ITNL Recent Labs  
  04/24/21 
0820 CPK 38* Recent Labs  
  04/25/21 
9063 04/25/21 
0419 04/24/21 
0820 04/24/21 
8816 *  --  136  --   
K 4.5  --  4.3  --   
  --  108  --   
CO2 22  --  21  --   
BUN 38*  --  38*  --   
CREA 1.40*  --  1.50*  --   
*  --  109*  --   
ALB 2.2*  --  2.5*  --   
WBC  --  7.6  --  8.0 HGB  --  9.5*  --  9.8* HCT  --  29.8*  --  31.5* PLT  --  225  --  193 No results for input(s): INR, PTP, APTT, INREXT, INREXT in the last 72 hours. Needs: urine analysis, urine sodium, protein and creatinine Lab Results Component Value Date/Time Creatinine, urine 148.1 02/21/2014 10:54 AM  
 
 
 
Cultures: No results found for: SATYA Lab Results Component Value Date/Time Culture result: NO GROWTH AFTER 20 HOURS 04/25/2021 09:09 AM  
 Culture result: NO GROWTH 2 DAYS 04/24/2021 08:20 AM  
 Culture result: (A) 04/22/2021 04:51 AM  
  STREPTOCOCCUS SANGUINIS GROWING IN BOTH BOTTLES DRAWN (SITE = L WRIST) Culture result: (A) 04/22/2021 04:51 AM  
  STREPTOCOCCUS SANGUINIS GROWING IN BOTH BOTTLES DRAWN (SITE = L FOREARM) Radiology:  
 
Medications Current Facility-Administered Medications Medication Dose Route Frequency Last Admin  NOREPINephrine (LEVOPHED) 8 mg in 5% dextrose 250mL (32 mcg/mL) infusion  0.5-16 mcg/min IntraVENous TITRATE 2 mcg/min at 04/26/21 0951  
 midazolam in normal saline (VERSED) 1 mg/mL infusion  0-10 mg/hr IntraVENous TITRATE 2 mg/hr at 04/26/21 0841  
 albuterol-ipratropium (DUO-NEB) 2.5 MG-0.5 MG/3 ML  3 mL Nebulization Q6H RT 3 mL at 04/26/21 0725  
 midazolam (VERSED) injection 1 mg  1 mg IntraVENous Q2H PRN 1 mg at 04/24/21 1546  cefTRIAXone (ROCEPHIN) 2 g in sterile water (preservative free) 20 mL IV syringe  2 g IntraVENous Q24H 2 g at 04/26/21 0840  famotidine (PF) (PEPCID) 20 mg in 0.9% sodium chloride 10 mL injection  20 mg IntraVENous Q24H 20 mg at 04/26/21 0839  
 heparin (porcine) injection 5,000 Units  5,000 Units SubCUTAneous Q8H 5,000 Units at 04/26/21 0543  fentaNYL citrate (PF) injection 50 mcg  50 mcg IntraVENous Q1H PRN 50 mcg at 04/24/21 0019  
 sodium chloride (NS) flush 5-40 mL  5-40 mL IntraVENous Q8H 10 mL at 04/26/21 0544  sodium chloride (NS) flush 5-40 mL  5-40 mL IntraVENous PRN    
 acetaminophen (TYLENOL) tablet 650 mg  650 mg Oral Q6H  mg at 04/25/21 1837 Or  acetaminophen (TYLENOL) suppository 650 mg  650 mg Rectal Q6H  mg at 04/24/21 0602  polyethylene glycol (MIRALAX) packet 17 g  17 g Oral DAILY PRN  promethazine (PHENERGAN) tablet 12.5 mg  12.5 mg Oral Q6H PRN Or  
 ondansetron (ZOFRAN) injection 4 mg  4 mg IntraVENous Q6H PRN Case discussed with: Nursing and daughter at bedside Shreya Sepulveda DO

## 2021-04-26 NOTE — PROGRESS NOTES
[facility] Medical Progress Note NAME: Seda Griffith :  1931 MRM:  [id number] Date/Time of service 2021  7:02 AM    
 
  
Assessment and Plan:  
 
Acute respiratory failure - POA post arrest. Remains intubated. Vent management per intensivist.  
  
Third degree heart block / Ventricular fibrillation / post Cardiopulmonary arrest / Elevated troponin / Hx Hypertension - POA, likely due to endocarditis. Emergently had pacer placed. Cardiology managing. No abscess seen on RAMAN today, but still high suspicion. Cath unremarkable. 
  
Streptococcus Bacteremia - POA, due to endocarditis. Consult ID. Continue high dose ceftriaxone and daptomyocin Septic Shock - Likely cardiogenic and septic. Wean levophed Anemia - POA and now moderate and stable. Check serologies. 
  
BARAK / CKD 3 - POA, due to hypoperfusion, now improving after IVF. Avoid nephrotoxins BPH (benign prostatic hyperplasia) - 
 
Gallbladder thickening - Outpatient follow up Hyperlipidemia - Holding meds Subjective: Chief Complaint:  Cannot obtain ROS: 
(bold if positive, if negative) Not Tolerating PT  Not Tolerating Diet Objective:  
 
Last 24hrs VS reviewed since prior progress note. Most recent are: 
 
Visit Vitals BP (!) 126/51 Pulse 60 Temp 99.9 °F (37.7 °C) Resp 30 Ht 5' 7\" (1.702 m) Wt 90.7 kg (199 lb 15.3 oz) SpO2 97% BMI 31.32 kg/m² SpO2 Readings from Last 6 Encounters:  
21 97% 07/10/19 95% 18 93% 18 96% 18 97% 18 98% O2 Flow Rate (L/min): 10 l/min Intake/Output Summary (Last 24 hours) at 2021 0317 Last data filed at 2021 0600 Gross per 24 hour Intake 3385.74 ml Output 1370 ml Net 2015.74 ml Physical Exam: 
 
Gen:  Obese, in no acute distress HEENT:  Pink conjunctivae, PERRL, hearing intact to voice, ET in place Neck:  Supple, without masses, thyroid non-tender Resp:  No accessory muscle use, bilateral breath sounds without wheezes rales or rhonchi 
Card:  No murmurs, normal S1, S2 without thrills, bruits or peripheral edema Abd:  Soft, non-tender, non-distended, reduced bowel sounds are present, no mass Lymph:  No cervical or inguinal adenopathy Musc:  No cyanosis or clubbing Skin:  No rashes or ulcers, skin turgor is good Neuro:  Cranial nerves are grossly intact, general motor weakness, does not follow commands appropriately Psych:  sedated Telemetry reviewed:   paced 
__________________________________________________________________ Medications Reviewed: (see below) Medications:  
 
Current Facility-Administered Medications Medication Dose Route Frequency  midazolam in normal saline (VERSED) 1 mg/mL infusion  0-10 mg/hr IntraVENous TITRATE  albuterol-ipratropium (DUO-NEB) 2.5 MG-0.5 MG/3 ML  3 mL Nebulization Q6H RT  
 midazolam (VERSED) injection 1 mg  1 mg IntraVENous Q2H PRN  
 cefTRIAXone (ROCEPHIN) 2 g in sterile water (preservative free) 20 mL IV syringe  2 g IntraVENous Q24H  
 famotidine (PF) (PEPCID) 20 mg in 0.9% sodium chloride 10 mL injection  20 mg IntraVENous Q24H  
 lactated Ringers infusion  75 mL/hr IntraVENous CONTINUOUS  
 heparin (porcine) injection 5,000 Units  5,000 Units SubCUTAneous Q8H  
 fentaNYL citrate (PF) injection 50 mcg  50 mcg IntraVENous Q1H PRN  propofol (DIPRIVAN) 10 mg/mL infusion  0-50 mcg/kg/min IntraVENous TITRATE  sodium chloride (NS) flush 5-40 mL  5-40 mL IntraVENous Q8H  
 sodium chloride (NS) flush 5-40 mL  5-40 mL IntraVENous PRN  
 acetaminophen (TYLENOL) tablet 650 mg  650 mg Oral Q6H PRN Or  
 acetaminophen (TYLENOL) suppository 650 mg  650 mg Rectal Q6H PRN  polyethylene glycol (MIRALAX) packet 17 g  17 g Oral DAILY PRN  promethazine (PHENERGAN) tablet 12.5 mg  12.5 mg Oral Q6H PRN  Or  
 ondansetron (ZOFRAN) injection 4 mg  4 mg IntraVENous Q6H PRN  
 NOREPINephrine (LEVOPHED) 8 mg in 5% dextrose 250mL (32 mcg/mL) infusion  0.5-16 mcg/min IntraVENous TITRATE Lab Data Reviewed: (see below) Lab Review:  
 
Recent Labs  
  04/25/21 
0419 04/24/21 
0511 WBC 7.6 8.0 HGB 9.5* 9.8* HCT 29.8* 31.5*  193 Recent Labs  
  04/25/21 
4967 04/24/21 
0820 * 136  
K 4.5 4.3  108 CO2 22 21 * 109* BUN 38* 38* CREA 1.40* 1.50* CA 8.2* 8.5 ALB 2.2* 2.5* TBILI 0.2 0.4 ALT 39 34 Lab Results Component Value Date/Time Glucose (POC) 116 (H) 08/05/2014 08:59 PM  
 
Recent Labs  
  04/26/21 
8715 04/25/21 
0352 04/24/21 
2809 PH 7.32* 7.33* 7.41  
PCO2 42 39 31* PO2 94 88 86 HCO3 21* 20* 19* FIO2 40 40 40 No results for input(s): INR, INREXT in the last 72 hours. All Micro Results Procedure Component Value Units Date/Time CULTURE, BLOOD, PAIRED [[id number]] Collected: 04/25/21 3921 Order Status: Completed Specimen: Blood Updated: 04/26/21 5712 Special Requests: NO SPECIAL REQUESTS Culture result: NO GROWTH AFTER 20 HOURS     
 CULTURE, BLOOD, PAIRED [[id number]] Collected: 04/24/21 0820 Order Status: Completed Specimen: Blood Updated: 04/26/21 7735 Special Requests: NO SPECIAL REQUESTS Culture result: NO GROWTH 2 DAYS     
 CULTURE, BLOOD [[id number]]  (Abnormal)  (Susceptibility) Collected: 04/22/21 0451 Order Status: Completed Specimen: Blood Updated: 04/25/21 3808 Special Requests: NO SPECIAL REQUESTS Culture result: STREPTOCOCCUS SANGUINIS GROWING IN BOTH BOTTLES DRAWN (SITE = L FOREARM) CULTURE, BLOOD [[id number]]  (Abnormal)  (Susceptibility) Collected: 04/22/21 0451 Order Status: Completed Specimen: Blood Updated: 04/25/21 0801 Special Requests: NO SPECIAL REQUESTS Culture result: STREPTOCOCCUS SANGUINIS GROWING IN BOTH BOTTLES DRAWN (SITE = L WRIST) CULTURE, BLOOD, PAIRED [[id number]] Order Status: Sent Specimen: Blood  MRSA CULTURE [[id number]] Collected: 04/22/21 0100 Order Status: Completed Specimen: Nasal from Nares Updated: 04/23/21 3678 Special Requests: NO SPECIAL REQUESTS Culture result: MRSA NOT PRESENT Screening of patient nares for MRSA is for surveillance purposes and, if positive, to facilitate isolation considerations in high risk settings. It is not intended for automatic decolonization interventions per se as regimens are not sufficiently effective to warrant routine use. CULTURE, BLOOD, PAIRED [[id number]] Order Status: Canceled Specimen: Blood MICRO TRACKING [[id number]] Collected: 04/22/21 0451 Order Status: Completed Updated: 04/23/21 6721 MICRO TRACKING [[id number]] Collected: 04/22/21 0451 Order Status: Completed Updated: 04/23/21 8023 CULTURE, RESPIRATORY/SPUTUM/BRONCH Shirline Diesel [[id number]] Collected: 04/23/21 0983 Order Status: Canceled Specimen: Sputum,ET Suction MICRO TRACKING [[id number]] Collected: 04/22/21 0451 Order Status: Completed Updated: 04/23/21 2420 MICRO TRACKING [[id number]] Collected: 04/22/21 0451 Order Status: Completed Updated: 04/22/21 2305 Other pertinent lab: none Total time spent with patient: 30 Minutes I personally reviewed chart, notes, data and current medications in the medical record. I have personally examined and treated the patient at bedside during this period. Care Plan discussed with: Patient, Nursing Staff, Consultant/Specialist and >50% of time spent in counseling and coordination of care Discussed:  Care Plan and D/C Planning Prophylaxis:  H2B/PPI Disposition:  [facility] 
        
___________________________________________________ Attending Physician: Vandana Delgado MD

## 2021-04-26 NOTE — PROGRESS NOTES
Comprehensive Nutrition Assessment Type and Reason for Visit: Flaquita Gilliam Nutrition Recommendations/Plan:  
Resume Vital HP @50mL, once cleared by MD after procedure, water flush of 50mL q8H 
· Keep HOB >30 degrees TF at goal: 1200mL of tf, 1200 kcal (~1500kcal including Propofol), 105g Protein, 1200mL of free water from tf & flushes [16.5 kcal/kg & 1.15g Pro/kg] Nutrition Assessment:    
: pt tolerating TF & at goal over the weekend. Currently TF on hold for RAMAN. Expect to be able to resume TF at previous rate once finished. Labs ok, BUN/Creat improving. No new weights. 323: 80year old male admitted for Third degree heart block ([facility] [I44.2], Bradycardia [R00.1], Elevated troponin [R77.8] and BARAK (acute kidney injury) ([facility] [address] [N17.9]. Pt is intubated following cardiac surgery. RD visits room, pt is alert with daughter at bedside. Pt responded to disagree with his estimated weight of 200# on admission. Review of wt hx shows his usual was indeed below 200# a few years ago. Malnutrition Assessment: 
Malnutrition Status:  No malnutrition Nutrition focused physical exam finds: large abdomen, semi-soft, dry skin, appears stated age, mild generalized edema, no significant signs of malnutrition. Daughter at bedside states, he very much likes to eat & has always had a great appetite. Estimated Daily Nutrient Needs: 
Energy (kcal): 1970 kcal (RMR [employer]Stat ); Weight Used for Energy Requirements: Admission(90.7 kg) Protein (g): 100 - 120g (1.5-1.8g/kg of IBW); Weight Used for Protein Requirements: Ideal(67.3 kg) Fluid (ml/day): 1970mL; Method Used for Fluid Requirements: 1 ml/kcal 
 
Nutrition Related Findings:      
Last BM:  Edema: 2+ LLE & RLE () Vent Measures: 9.47 l/min Temp (24hrs), Av.9 °F (38.3 °C), Min:99.9 °F (37.7 °C), Max:102.3 °F (39.1 °C) Wounds:  Small surgical  
Several bruises from IV, no skin breakdown noted Current Nutrition Therapies: DIET TUBE FEEDING Vital HP @20mL, increase by 10mL Q8H to goal of 50mL/h, 50mL water flush Q8H 
DIET NPO Anthropometric Measures: 
· Height:  5' 7\" (170.2 cm) · Current Body Wt:  90.7 kg (199 lb 15.3 oz) · Admission Body Wt:      
· Usual Body Wt:  86.2 kg (190 lb) · Ideal Body Wt:  148 lbs:  135.1 % Body mass index is 31.32 kg/m². · BMI Category:  Obese class 1 (BMI 30.0-34. 9) Last 3 Recorded Weights in this Encounter 04/21/21 1826 04/22/21 1052 Weight: 90.7 kg (200 lb) 90.7 kg (199 lb 15.3 oz) Wt Readings from Last 10 Encounters:  
04/22/21 90.7 kg (199 lb 15.3 oz) 07/10/19 86.2 kg (190 lb)  
11/26/18 83.5 kg (184 lb)  
11/20/18 84.8 kg (187 lb) 08/03/18 84.8 kg (187 lb) 08/01/18 85.7 kg (189 lb) 05/14/18 83.9 kg (185 lb) 05/11/18 83.9 kg (185 lb) 05/08/18 83.9 kg (185 lb) 05/03/18 84.8 kg (187 lb) Nutrition Diagnosis:  
· Inadequate protein-energy intake related to catabolic illness, cardiac dysfunction as evidenced by NPO or clear liquid status due to medical condition, intubation Nutrition Interventions:  
Food and/or Nutrient Delivery: Start tube feeding Nutrition Education and Counseling: Education initiated Coordination of Nutrition Care: Continue to monitor while inpatient, Interdisciplinary rounds Goals: Tolerance of 80% of RMR via EN over the next 3-4 days Nutrition Monitoring and Evaluation:  
Behavioral-Environmental Outcomes:   
Food/Nutrient Intake Outcomes: Enteral nutrition intake/tolerance, IVF intake Physical Signs/Symptoms Outcomes: Biochemical data, GI status, Weight Discharge Planning: Too soon to determine Electronically signed by Fanta Sheehan RD, MS on 4/26/2021 at 2:36 PM 
Contact via [address] [place] [address] or office [phone]

## 2021-04-26 NOTE — PROGRESS NOTES
Asked to perform RAMAN to rule out endocarditis. Reviewed plan and risks with daughter. Consent signed. No contraindications.

## 2021-04-26 NOTE — CONSULTS
Palliative Medicine Consult [place]: [phone]) Patient Name: Nawaf Arguelles YOB: 1931 Date of Initial Consult: 4/26/21 Reason for Consult: care decisions Requesting Provider: MARTINEZ Del Rio / Dr. lAma Browning Primary Care Physician: Natalya Perera MD 
 
 SUMMARY:  
Nawaf Arguelles is a 80 y.o. with a medical history significant for peripheral neuropathy, HTN, HLD, hypothyroidism, moderate AS, CAD s/p PCI and obesity who was sent to the ED from [place] [address] on 4/21/2021 with concerns of several days of weakness confounded by hypotension and bradycardia on the day of presentation. He was found to be in CHB and required transvenous pacing. While in the ED awaiting admission he lost a pulse and was noted to be in vib. He received one shock and one round of CPR, amio, epi, intubated. ROSC was achieved. He was taking urgently to the cath lab for placement of a temporary pacer via the right femoral, nonobstructive CAD noted. Workup has since revealed viridans streptococci bacteremia. He remains intubated and is intermittently febrile. Current medical issues leading to Palliative Medicine involvement include: high risk decline, support for care decisoins. Mr. Antione Johnson has been residing at [facility] for past 2 yrs. Prior to that in his own home in [address] [place] [address] he built in 2012. Recent baseline is near wheelchair bound due to neuropathy. Staff does assist in brief stints of walking. He has no cognitive deficits. Enjoys bingo, puzzles, socialization and time with family. Psychosocial Hx- . He has one daughter Rachel Barahona. Former marine. Worked at [employer] as a . He grew up in [address] [place] [address]. PALLIATIVE DIAGNOSES:  
1. Goals of care counseling/discussion 2. DNR discussion 3. S/p in house cardiac arrest  
4. 3rd degree block- femoral temporary pacer 5. BARAK on CKD 6. Respiratory failure following cardiac arrest 
7.  H/o COVID-19 early Feb 2021 (did not require hospitalization) PLAN:  
1. Pt intubated and sedated with Versed. 2. Ada Leonard LCSW and I met with his daughter Elizabeth Brar at bedside. Please refer to her note for full psychosocial details. 3. Elizabeth Brar expressed good understanding of the critical nature of her Father's condition. She is trying her best to follow all information provided by the medical team thus far. We acknowledged current unknown about source of his infection. RAMAN performed today. CTA now planned for this afternoon to further investigate for abscess/source. 4. Goals of care- Elizabeth Brar emphasized that her Father very much values his independence and ability to be active. He continues to talk about being able to walk again even though clinically this was unlikely. Elizabeth Brar is quite certain he would not want life prolonging measures unless they were expected to result in recovery to his prior baseline (noted above). In this context along with review of his living will, we reviewed current code status and decision was made to change to DNR status. I did discuss that if he becomes a candidate for a PPM, consideration would be given to suspend this decision during this procedure. 5. Offered to conference in other family members as we hold future discussions, Elizabeth Brar would like her  to participate. 6. Thank you for the opportunity to participate in the care of Phillip Jaramillo 7. Communicated plan of care with: Palliative IDT, [facility] Team including bedside RN Peter Moreno, cardiology NP Terry Delcid. GOALS OF CARE / TREATMENT PREFERENCES:  
 
GOALS OF CARE: 
Patient/Health Care Proxy Stated Goals: Other (comment)(return to prior baseline- cognitively intact, wheelchair able) TREATMENT PREFERENCES:  
Code Status: DNR Advance Care Planning: 
[x] The [facility] Interdisciplinary Team has updated the ACP Navigator with [address] and Patient Capacity   Primary Decision Maker: Leeann Narvaez - Daughter - [phone] Advance Care Planning 4/26/2021 Patient's Healthcare Decision Maker is: Named in scanned ACP document Primary Decision Maker Name -  
Primary Decision Maker Phone Number -  
Primary Decision Maker Relationship to Patient -  
Confirm Advance Directive Yes, on file Medical Interventions: Limited additional interventions(DNR, no long term life support) Artificially Administered Nutrition: No feeding tube Other: As far as possible, the palliative care team has discussed with patient / health care proxy about goals of care / treatment preferences for patient. HISTORY:  
 
History obtained from:   Chart review, daughter CHIEF COMPLAINT: n/a 
 
HPI/SUBJECTIVE: The patient is:  
[] Verbal and participatory [x] Non-participatory due to: Intubated/sedated Clinical Pain Assessment (nonverbal scale for severity on nonverbal patients):  
Clinical Pain Assessment Severity: 0 Activity (Movement): Lying quietly, normal position Duration: for how long has pt been experiencing pain (e.g., 2 days, 1 month, years) Frequency: how often pain is an issue (e.g., several times per day, once every few days, constant) FUNCTIONAL ASSESSMENT:  
 
Palliative Performance Scale (PPS): PPS: 10 PSYCHOSOCIAL/SPIRITUAL SCREENING:  
 
Palliative IDT has assessed this patient for cultural preferences / practices and a referral made as appropriate to needs (Cultural Services, Patient Advocacy, Ethics, etc.) Any spiritual / Buddhist concerns: 
[] Yes /  [x] No 
 
Caregiver Burnout: 
[] Yes /  [x] No /  [] No Caregiver Present Anticipatory grief assessment:  
[x] Normal  / [] Maladaptive ESAS Anxiety: ESAS Depression:    
 
 
 REVIEW OF SYSTEMS:  
 
Positive and pertinent negative findings in ROS are noted above in HPI. The following systems were [x] reviewed / [] unable to be reviewed as noted in HPI Other findings are noted below. Systems: constitutional, ears/nose/mouth/throat, respiratory, gastrointestinal, genitourinary, musculoskeletal, integumentary, neurologic, psychiatric, endocrine. Positive findings noted below. Modified ESAS Completed by: provider Pain: 0 Dyspnea: 0 Stool Occurrence(s): 1 PHYSICAL EXAM:  
 
From RN flowsheet: 
Wt Readings from Last 3 Encounters:  
04/26/21 199 lb (90.3 kg) 07/10/19 190 lb (86.2 kg)  
11/26/18 184 lb (83.5 kg) Blood pressure (!) 126/42, pulse 62, temperature (!) 100.6 °F (38.1 °C), resp. rate 21, height 5' 7\" (1.702 m), weight 199 lb (90.3 kg), SpO2 96 %. Pain Scale 1: Adult Nonverbal Pain Scale Pain Intensity 1: 0 Pain Location 1: Other (comment)([place]) Pain Intervention(s) 1: Medication (see MAR) Last bowel movement, if known: Obese white male, intubated/sedated with Versed. Respirations compliant with vent. HISTORY:  
 
Active Problems: Hypertension (8/1/2013) Hyperlipidemia (8/1/2013) BPH (benign prostatic hyperplasia) (8/1/2013) Hypoxia (4/21/2021) Third degree heart block ([address] (4/21/2021) Bradycardia (4/21/2021) Elevated troponin (4/21/2021) BARAK (acute kidney injury) ([address] (4/21/2021) Cardiogenic shock ([address] (4/21/2021) Asystole ([address] (4/21/2021) Ventricular fibrillation ([address] (4/21/2021) Past Medical History:  
Diagnosis Date  BPH (benign prostatic hyperplasia)  CKD (chronic kidney disease) 5/28/15 pt denies kidney disease  DDD (degenerative disc disease), lumbar  Elevated PSA  Endocrine disease Chronic kidney disease  Heart murmur Dr Austin Doshi  Hoarse   
 x6 months as of 5/28/15; being evaluated by Dr Yovanny Elizabeth  Hyperlipemia  Hyperlipidemia  Hypertension Dr Alexei Bob  Hypothyroidism  Ill-defined condition   
 aortic stenosis  Ill-defined condition   
 benign prostatic hyperplasia  Ill-defined condition   
 heart murmur  Osteoarthritis, shoulder  Osteoporosis  Positive PPD, treated   
 treated with INH  Sensorineural hearing loss   
 as of 5/28/15 pt wears hearing aids  Sleep disorder   
 insomnia  Unspecified adverse effect of anesthesia 1960s  
 delayed awaking Past Surgical History:  
Procedure Laterality Date  HX CATARACT REMOVAL Bilateral 2004  HX COLONOSCOPY  2006 [address]  HX INTRAOCULAR LENS PROSTHESIS    
 bilateral  
 HX LUMBAR LAMINECTOMY   Abbie Cuenca [place]  HX MOHS PROCEDURES  prior to   
 and bicep repair  WA TOTAL KNEE ARTHROPLASTY Right 14 Family History Problem Relation Age of Onset  Cancer Father   
     lung  Stroke Mother  Diabetes Mother  Diabetes Sister  Cancer Sister History reviewed, no pertinent family history. Social History Tobacco Use  Smoking status: Former Smoker Packs/day: 1.00 Years: 26.00 Pack years: 26.00 Types: Cigarettes Quit date: 1976 Years since quittin.3  Smokeless tobacco: Never Used Substance Use Topics  Alcohol use: Yes Alcohol/week: 1.0 standard drinks Types: 1 Shots of liquor per week Comment: 4-5x week burbon in afternoon No Known Allergies Current Facility-Administered Medications Medication Dose Route Frequency  NOREPINephrine (LEVOPHED) 8 mg in 5% dextrose 250mL (32 mcg/mL) infusion  0.5-16 mcg/min IntraVENous TITRATE  metroNIDAZOLE (FLAGYL) IVPB premix 500 mg  500 mg IntraVENous Q12H  
 midazolam in normal saline (VERSED) 1 mg/mL infusion  0-10 mg/hr IntraVENous TITRATE  albuterol-ipratropium (DUO-NEB) 2.5 MG-0.5 MG/3 ML  3 mL Nebulization Q6H RT  
 midazolam (VERSED) injection 1 mg  1 mg IntraVENous Q2H PRN  
 cefTRIAXone (ROCEPHIN) 2 g in sterile water (preservative free) 20 mL IV syringe  2 g IntraVENous Q24H  
 famotidine (PF) (PEPCID) 20 mg in 0.9% sodium chloride 10 mL injection  20 mg IntraVENous Q24H  
 heparin (porcine) injection 5,000 Units  5,000 Units SubCUTAneous Q8H  
 fentaNYL citrate (PF) injection 50 mcg  50 mcg IntraVENous Q1H PRN  
 sodium chloride (NS) flush 5-40 mL  5-40 mL IntraVENous Q8H  
 sodium chloride (NS) flush 5-40 mL  5-40 mL IntraVENous PRN  
 acetaminophen (TYLENOL) tablet 650 mg  650 mg Oral Q6H PRN  polyethylene glycol (MIRALAX) packet 17 g  17 g Oral DAILY PRN  
 ondansetron (ZOFRAN) injection 4 mg  4 mg IntraVENous Q6H PRN  
 
 
 
 LAB AND IMAGING FINDINGS:  
 
Lab Results Component Value Date/Time WBC 7.6 04/25/2021 04:19 AM  
 HGB 9.5 (L) 04/25/2021 04:19 AM  
 PLATELET 701 72/44/5486 04:19 AM  
 
Lab Results Component Value Date/Time Sodium 135 (L) 04/25/2021 09:09 AM  
 Potassium 4.5 04/25/2021 09:09 AM  
 Chloride 107 04/25/2021 09:09 AM  
 CO2 22 04/25/2021 09:09 AM  
 BUN 38 (H) 04/25/2021 09:09 AM  
 Creatinine 1.40 (H) 04/25/2021 09:09 AM  
 Calcium 8.2 (L) 04/25/2021 09:09 AM  
 Magnesium 2.4 04/23/2021 03:37 AM  
 Phosphorus 3.2 04/22/2021 06:33 AM  
  
Lab Results Component Value Date/Time Alk. phosphatase 154 (H) 04/25/2021 09:09 AM  
 Protein, total 6.3 (L) 04/25/2021 09:09 AM  
 Albumin 2.2 (L) 04/25/2021 09:09 AM  
 Globulin 4.1 (H) 04/25/2021 09:09 AM  
 
Lab Results Component Value Date/Time INR 1.1 04/22/2021 06:33 AM  
 Prothrombin time 11.1 04/22/2021 06:33 AM  
 aPTT 27.5 04/21/2021 06:31 PM  
  
No results found for: IRON, FE, TIBC, IBCT, PSAT, FERR Lab Results Component Value Date/Time pH 7.32 (L) 04/26/2021 04:53 AM  
 PCO2 42 04/26/2021 04:53 AM  
 PO2 94 04/26/2021 04:53 AM  
 
No components found for: [place] Lab Results Component Value Date/Time CK 38 (L) 04/24/2021 08:20 AM  
 CK - MB 4.1 (H) 11/26/2018 11:53 AM  
  
 
 
   
 
Total time:  70m Counseling / coordination time, spent as noted above: 50m 
> 50% counseling / coordination?: y 
 
Prolonged service was provided for  []30 min   []75 min in face to face time in the presence of the patient, spent as noted above. Time Start:  
Time End:  
Note: this can only be billed with 92912 (initial) or 86493 (follow up). If multiple start / stop times, list each separately.

## 2021-04-26 NOTE — PROGRESS NOTES
RAMAN results reviewed w Dr Turner Flores No endocarditis or aortic abscess seen on RAMAN Will proceed w CTA w & w/o contrast to further assess for gail-valvular aortic abscess

## 2021-04-26 NOTE — ACP (ADVANCE CARE PLANNING)
Advance Care Planning Advance Care Planning (ACP) Physician/NP/PA Conversation Date of Conversation: 4/21/2021 Conducted with: Healthcare Decision Maker: Named in Advance Directive or [address] [place] [address] Healthcare Decision Maker:  
  Primary Decision Maker: Leeann Narvaez - Daughter - [phone] Today we documented Decision Maker(s) consistent with Legal Next of Kin hierarchy. Care Preferences: Hospitalization: \"If your health worsens and it becomes clear that your chance of recovery is unlikely, what would be your preference regarding hospitalization? \" 
n/a- patient is currently hospitalizaed and outcome of this hospitalization is uncertain Ventilation: \"If you were unable to breathe on your own and your chance of recovery was unlikely, what would be your preference about the use of a ventilator (breathing machine) if it was available to you? \"  
not applicable- patient is currently on a ventilator and there is no current plan for extubation. Will specifically discuss plan for reintubation with daughter if patient progresses to extubation at any point. Resuscitation: \"In the event your heart stopped as a result of an underlying serious health condition, would you want attempts to be made to restart your heart, or would you prefer a natural death? \" No, do NOT attempt to resuscitate. - patient has a natural death declaration which daughter Jose Arrington has reviewed. She expressed clearly that her Father would not want to be resuscitated if not expected to return to his previous state of health and function. Additional topics discussed: treatment goals, end of life care preferences (vegetative state/imminent death) and patient's preferred quality of life Conversation Outcomes / Follow-Up Plan:  
ACP in process- clinical situation requires ongoing follow up Reviewed DNR/DNI and patient elects DNR order - completed DNR order.   Will consider DDNR dependent upon clinical situation at time of discharge. If patient regains capacity for decision making at any point will shift conversation directly to him.   
 
Haven Pena MD

## 2021-04-26 NOTE — PROGRESS NOTES
0700 Report received from previous shift. Pt in bed sedated and on ventilator. See Flowsheet for assessments an turns. See MAR for medications and titrations. 0800 Family at bedside. 1030 Tube feeds held at this time for RAMAN at 1130 
1130 Family stepped out for procedure [address] [facility] Dr. Shira Dumont at bedside to perform RAMAN 
1200 RAMAN completed, family returned to bedside. Tube feeds restarted. 1 Family left. Pt transported to CT  
1600 Pt returned to the unit no change in status. Tube feeds change [id number] Daughter called and updated at this time 1900 Bedside shift change report given to Dinesh (oncoming nurse) by Alexia Estrada (offgoing nurse). Report included the following information SBAR, Kardex, Procedure Summary, Intake/Output, MAR, Med Rec Status, Cardiac Rhythm Paced and Alarm Parameters .

## 2021-04-26 NOTE — PROCEDURES
Sol Delgado MD. [facility] Patient: Trena Morrison : 1931 Today's Date: 2021 BRIEF RAMAN PROCEDURE NOTE Date of Procedure: 2021 Preoperative Diagnosis: Bacteriemia Postoperative Diagnosis: No obvious vegetation Procedure: Transesophageal Echo 
Cardiologist: Danika Odell MD 
Anesthesia:  IV sedation (on Versed gtt; intubated) Estimated Blood Loss: None Specimens Removed: None Assistants: None Grafts, transplants, or devices implanted: None Findings:   Slight thickening of aortic root but no clear abscess. LVEF 55%. Calcified aortic valve with moderate aortic stenosis (mean PG 22 mmHg) ADDENDUM - in reviewing images, I can't rule out a vegetation on the non-coronary cusp of AV which is calcified from his known aortic stenosis making this study difficult. I will review images with my colleagues. See full RAMAN note. Complications: none

## 2021-04-26 NOTE — PROGRESS NOTES
Bedside and Verbal shift change report given to Ty Conner RN (oncoming nurse) by Handy Dhaliwal RN (offgoing nurse). Report included the following information SBAR, Intake/Output, MAR, Recent Results, Cardiac Rhythm Paced NSR and Alarm Parameters . Primary Nurse Zuleyma Gore RN and Wilmar Johnson RN performed a dual skin assessment on this patient No impairment noted Fabio score is 13 
 
2000 Assessment, see flowsheets. Meds administered, see MAR. Pupils PERRLA. Pt withdraws to pain with LEs. GR tan 90ml. Tube feed advanced to 50ml/hr. Found OGT at 60cm when supposed to be 65cm. Stopped tube feed advanced to 65cm. Ordered ABD XR per protocol. Oral care and suction complete. Pt repositioned. Restraints ordered verified. [address] Oral care and suction complete. Pt repositioned. [address] [facility] Radiology Technician at bedside taking ABD XR. Resulted that OGT in proper position. Tube feed restarted @ 50ml/hr. Propofol drip stopped, see MAR. 
 
0000 Reassessment, see flowsheets. Oral care and suction complete. Pt repositioned. [address] Oral care and suction complete. Pt repositioned. 0400 Reassessment, see flowsheets. Oral care and suction complete. Pt repositioned. 0500 Meds administered, see MAR. 
 
0600 Oral care and suction complete. Pt repositioned. Bedside and Verbal shift change report given to Alva Dolan RN (oncoming nurse) by Ty Conner RN (offgoing nurse). Report included the following information SBAR, Intake/Output, MAR, Recent Results, Cardiac Rhythm Paced and Alarm Parameters .

## 2021-04-26 NOTE — PROGRESS NOTES
Palliative Medicine Code Status: DNR Advance Care Planning: 
Advance Care Planning 2021 Patient's Healthcare Decision Maker is: Named in scanned ACP document Primary Decision Maker Name Lizet Argueta Primary Decision Maker Phone Number [phone]     
Primary Decision Maker Relationship to Patient Daughter Confirm Advance Directive Yes, on file Patient / Family Encounter Documentation Participants (names):  Daughter Eliseo Atkinson, Palliative Medicine (Dr. Silvana Floyd, [address]) Narrative:  Met with dtr at bedside; pt is currently intubated/sedated in ICU, dtr provided history. Pt's wife  in ; Eliseo Atkinson is pt's only child. Pt served in the [employer] x 2 yrs, worked for [employer], delivered mail between sites. Pt was living alone in [facility] area after wife's death but decided in  at [de-identified] yrs of age to have a house built on the same street as his siblings near [address] [place] [address]. Pt moved into [address] [place] [address] approximately 2 yrs ago after health began to decline. Dtr reports pt is mentally sharp, is very social, grew to enjoy Bingo after moving to facility. Pt uses wheelchair for mobility due to neuropathy; staff does assist pt with limited walking each day. Pt previously enjoyed doing puzzles with dtr but has not had the table space since being confined to room for meals during pandemic. Pt has AMD on file dated 2012 which appoints dtr Eliseo Atkinson as sole Medical POA. Dtr verbalized understanding of the critical nature of pt's condition, expressed that pt would not want life prolonged artificially if he was not expected to return to his independent, strong-willed self. Dtr expressed preference to allow natural death if pt were to suffer cardiac arrest despite current efforts.    
 
Psychosocial Issues Identified/ Resilience Factors: Anticipatory grief, dttacho is an only child, shared that she does have her  for support but shared of the difficulty not having siblings to rely on during difficult times. No spiritual concerns expressed, Chaplains have been visiting. Goals of Care / Plan:  Dtr is aware of potential for decline, is struggling with desire to be with pt at all times vs awareness of the importance of caring for herself. Dtr is hopeful for improvement but indicated pt would not want life prolonged without hope for a return to relative independence and a joyful quality of life. Code status has been changed to DNR (pt is currently intubated; dtr requests no cpr/no shock in the event of cardiac arrest). Dtr requested that her  join by phone or in person if possible for future goals of care conversations. Thank you for including Palliative Medicine in the care of Mr. Samson Senior. Bakari Gregory LCSW, [facility] 
[phone])

## 2021-04-27 NOTE — PROGRESS NOTES
Problem: Non-Violent Restraints Goal: Removal from restraints as soon as assessed to be safe Outcome: Resolved/Met Goal: No harm/injury to patient while restraints in use Outcome: Resolved/Met Goal: Patient's dignity will be maintained Outcome: Resolved/Met Goal: Patient Interventions 4/27/2021 0312 by Ricardo Duarte RN Outcome: Resolved/Met 
4/26/2021 2045 by Ricardo Duarte RN Outcome: Resolved/Not Met Problem: Ventilator Management Goal: *Adequate oxygenation and ventilation Outcome: Progressing Towards Goal 
Goal: *Patient maintains clear airway/free of aspiration Outcome: Progressing Towards Goal 
Goal: *Absence of infection signs and symptoms Outcome: Progressing Towards Goal 
Goal: *Normal spontaneous ventilation Outcome: Progressing Towards Goal 
  
Problem: Falls - Risk of 
Goal: *Absence of Falls Description: Document Anna Couch Fall Risk and appropriate interventions in the flowsheet. Outcome: Progressing Towards Goal 
Note: Fall Risk Interventions: 
  
 
Mentation Interventions: Adequate sleep, hydration, pain control, Bed/chair exit alarm, Door open when patient unattended, Evaluate medications/consider consulting pharmacy, Increase mobility, More frequent rounding, Reorient patient, Room close to nurse's station, Update white board Medication Interventions: Bed/chair exit alarm, Evaluate medications/consider consulting pharmacy Elimination Interventions: Bed/chair exit alarm, Toileting schedule/hourly rounds History of Falls Interventions: Bed/chair exit alarm, Consult care management for discharge planning, Door open when patient unattended, Evaluate medications/consider consulting pharmacy, Room close to nurse's station, Vital signs minimum Q4HRs X 24 hrs (comment for end date) Problem: Pressure Injury - Risk of 
Goal: *Prevention of pressure injury Description: Document Fabio Scale and appropriate interventions in the flowsheet.  
Outcome: Progressing Towards Goal 
Note: Pressure Injury Interventions: 
Sensory Interventions: Assess changes in LOC, Avoid rigorous massage over bony prominences, Check visual cues for pain, Discuss PT/OT consult with provider, Float heels, Keep linens dry and wrinkle-free, Maintain/enhance activity level, Minimize linen layers, Monitor skin under medical devices, Pad between skin to skin, Pressure redistribution bed/mattress (bed type), Suspension boots, Turn and reposition approx. every two hours (pillows and wedges if needed), Use 30-degree side-lying position Moisture Interventions: Absorbent underpads, Apply protective barrier, creams and emollients, Check for incontinence Q2 hours and as needed, Contain wound drainage, Internal/External urinary devices, Maintain skin hydration (lotion/cream), Minimize layers, Moisture barrier Activity Interventions: Pressure redistribution bed/mattress(bed type), PT/OT evaluation Mobility Interventions: Float heels, HOB 30 degrees or less, Pressure redistribution bed/mattress (bed type), PT/OT evaluation, Turn and reposition approx. every two hours(pillow and wedges) Nutrition Interventions: Document food/fluid/supplement intake, Discuss nutritional consult with provider Friction and Shear Interventions: Apply protective barrier, creams and emollients, Feet elevated on foot rest, HOB 30 degrees or less, Lift sheet, Lift team/patient mobility team, Minimize layers, Transferring/repositioning devices

## 2021-04-27 NOTE — PROGRESS NOTES
Transition of care note:    RUR 18% -low risk for readmission    LOS 6 days    Plan:  In reviewing ID's note,there are concerns for bacterial endocarditis. Pt will eventually need a permanent pacemaker placed but the finding impacts on when pacemaker can be placed. The plan as discussed with pt.'s daughter previously is for pt to go to [facility] when stable. PTA,pt lived in assisted living @ [facility].     Abran Boothe

## 2021-04-27 NOTE — PROGRESS NOTES
Cardiology Progress Note       ICU 
NAME:  Tanya Magana :   1931 MRN:   [id number] Critically ill Assessment/Plan: 1. Strep sanguinous bacteria: RAMAN with no clear abcess or endocarditis. CT without abcess. May need to repeat RAMAN in a few days pending clinical course. Still have high suspicion for paravalvular abscess. ABX  Per ID. May need to change pacer line if fevers persist. T max 100.9 overnight,  
2. 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  
3. Acute resp failure: vent mgt per intensivist.   
 
 
  
 
Subjective:  
Cardiac ROS: intubated/sedated Previous Cardiac Eval 
21 ECHO RAMAN W OR WO CONTRAST 2021 Narrative · LV: Estimated LVEF is 55 - 60%. Normal cavity size, wall thickness and  
systolic function (ejection fraction normal). Wall motion: normal. 
· Aortic Valve: There is severe noncoronary leaflet calcification. There  
is moderate aortic stenosis. Trace aortic valve regurgitation. There is no  
obvious vegetation on the aortic valve, however it is difficult to rule  
out endocarditis on the severely calcified non-coronary cusp. There is  
slight thickening of the aortic root, however no abscess is seen. · There is no obvious vegetation on the aortic valve, however it is  
difficult to rule out endocarditis on the severely calcified non-coronary  
cusp. Will review images with my colleagues. Signed by: Anabell Curiel MD  
 
21 CARDIAC PROCEDURE 2021 Narrative R CFA - micropuncture; ultrasound, fluoroscopic guided access - 6 F sheath 
R FV -  micropuncture; ultrasound, fluoroscopic guided access; 9 F cordis 
  
5 F balloon tip/Temp pacer wire inserted - 60 bpm/5 mA  
  
  
L Main: Short; Nml 
  
LAD: Med; MLI; D1 - MLI 
  
LCflex: Med; MLI 
  
RCA: Med Dominant; Mid 50%;  Distal stent patent; PLB - TAM; PDA - small -  
ostial 80% 
  
LVEDP: 26 mm Hg 
  
LVEF: Hand injection / No well visualised 
  
Straight wire used to cross valve - AV - mod stenosis - Peak to peak  
gradient 30 mm Hg PCI: none 
  
  
Specimens Removed : None 
  
Complications: None 
  
Closure Device: R CFA - manual 
  
R FV - Cordis sutured in situ 
  
Pt was in SVT - prob sinus tach vs aflutter with 2:1 block  - 130bpm - Epi  
weaned off and Levophed gtt weaned off. SBP dec from 170 -110's.  
  
When AV was crossed - SVT changed to paced rhythm - 60bpm.  SBP in 100's. Levophed gtt restarted at 5mcg 
   
  Signed by: Tita Gaitan MD  
 
 
 
Review of Systems: intubated/sedated Objective:  
 
Visit Vitals BP (!) 126/46 Pulse 62 Temp 99.9 °F (37.7 °C) Resp 26 Ht 5' 7\" (1.702 m) Wt 90.3 kg (199 lb) SpO2 96% BMI 31.17 kg/m² O2 Flow Rate (L/min): 10 l/min O2 Device: Ventilator Temp (24hrs), Av.5 °F (38.1 °C), Min:99.9 °F (37.7 °C), Max:101.2 °F (38.4 °C) No intake/output data recorded.  190 -  0700 In: 3731.7 [I.V.:1741.7] Out: 1900 [[facility]:4752] TELE: paced 60 General: in NAD. HEENT: Atraumatic. Orally intubated, OG, oral mucosa moist.  Anicteric sclerae. Neck : Supple Lungs: CTA bilaterally. No wheezing/rhonchi/rales. Heart: Regular rhythm, No JVD. No carotid bruits. Abdomen: Soft, non-distended, + Bowel sounds. Extremities: generalized upper and lower ext edema. Prevalon boots on. Neurologic: Grossly intact. Psych: sedated Care Plan discussed with: 
  Comments Patient Family RN x Care Manager Consultant:  x Data Review: No lab exists for component: ITNL Recent Labs  
  21 
0820 CPK 38* Recent Labs  
  21 
0430 21 
3742 21 
0419 21 
0820  135*  --  136  
K 4.2 4.5  --  4.3  107  --  108 CO2 26 22  --  21 BUN 46* 38*  --  38* CREA 1.38* 1.40*  --  1.50* * 132*  --  109* MG 2.3  --   --   --   
ALB  --  2.2*  --  2.5* WBC 6.6  --  7.6  --   
HGB 8.8* --  9.5*  --   
HCT 28.2*  --  29.8*  --   
  --  225  -- No results for input(s): INR, PTP, APTT, INREXT in the last 72 hours. Medications reviewed Current Facility-Administered Medications Medication Dose Route Frequency  NOREPINephrine (LEVOPHED) 8 mg in 5% dextrose 250mL (32 mcg/mL) infusion  0.5-16 mcg/min IntraVENous TITRATE  metroNIDAZOLE (FLAGYL) IVPB premix 500 mg  500 mg IntraVENous Q12H  
 midazolam in normal saline (VERSED) 1 mg/mL infusion  0-10 mg/hr IntraVENous TITRATE  albuterol-ipratropium (DUO-NEB) 2.5 MG-0.5 MG/3 ML  3 mL Nebulization Q6H RT  
 midazolam (VERSED) injection 1 mg  1 mg IntraVENous Q2H PRN  
 cefTRIAXone (ROCEPHIN) 2 g in sterile water (preservative free) 20 mL IV syringe  2 g IntraVENous Q24H  
 famotidine (PF) (PEPCID) 20 mg in 0.9% sodium chloride 10 mL injection  20 mg IntraVENous Q24H  
 heparin (porcine) injection 5,000 Units  5,000 Units SubCUTAneous Q8H  
 fentaNYL citrate (PF) injection 50 mcg  50 mcg IntraVENous Q1H PRN  
 sodium chloride (NS) flush 5-40 mL  5-40 mL IntraVENous Q8H  
 sodium chloride (NS) flush 5-40 mL  5-40 mL IntraVENous PRN  
 acetaminophen (TYLENOL) tablet 650 mg  650 mg Oral Q6H PRN  polyethylene glycol (MIRALAX) packet 17 g  17 g Oral DAILY PRN  
 ondansetron (ZOFRAN) injection 4 mg  4 mg IntraVENous Q6H PRN Lindsay Gutiérrez NP

## 2021-04-27 NOTE — PROGRESS NOTES
Note dictated. No plans for intervention at this juncture. LFTs unremarkable. Gallbladder does not appear to be source, but will continue to follow the patient over the next few days. Discussed with nurse and Dr. Don Minaya.     Michelle Lyman MD

## 2021-04-27 NOTE — PROGRESS NOTES
[facility] Medical Progress Note NAME: Tammy Irving :  1931 MRM:  [id number] Date/Time of service 2021  7:17 AM    
 
  
Assessment and Plan:  
 
Acute respiratory failure - POA post arrest. Remains intubated. Vent management per intensivist.  
  
Third degree heart block / Ventricular fibrillation / post Cardiopulmonary arrest / Elevated troponin / Hx Hypertension - POA, likely due to endocarditis. Emergently had pacer placed. Cardiology managing. No abscess seen on RAMAN , CTA chest on  does not comment on any heart abscess, but still high suspicion. Cath unremarkable. May need to repeat RAMAN 
  
Streptococcus Bacteremia - POA, due to endocarditis, vs gallbladder. Consulted ID who are managing antibiotics. They consulted general surgery. Continue high dose ceftriaxone and flagyl Septic Shock - Likely cardiogenic and septic. Weaned of levophed, but BP still low. Still fever. Anemia - POA and now moderate and dropping slowly. Check serologies. 
  
BARAK / CKD 3 - POA, due to hypoperfusion, now stable after IVF. Avoid nephrotoxins BPH (benign prostatic hyperplasia) - Monitor for obstruction Gallbladder thickening - Outpatient follow up Hyperlipidemia - Holding meds Subjective: Chief Complaint:  Cannot obtain ROS: 
(bold if positive, if negative) Not Tolerating PT  Not Tolerating Diet Objective:  
 
Last 24hrs VS reviewed since prior progress note. Most recent are: 
 
Visit Vitals BP (!) 126/46 Pulse 60 Temp 99.9 °F (37.7 °C) Resp 29 Ht 5' 7\" (1.702 m) Wt 90.3 kg (199 lb) SpO2 95% BMI 31.17 kg/m² SpO2 Readings from Last 6 Encounters:  
21 95% 07/10/19 95% 18 93% 18 96% 18 97% 18 98% O2 Flow Rate (L/min): 10 l/min Intake/Output Summary (Last 24 hours) at 2021 3 [facility] Last data filed at 2021 0700 Gross per 24 hour Intake 2100.16 ml Output 1595 ml Net 505.16 ml Physical Exam: 
 
Gen:  Obese, in no acute distress HEENT:  Pink conjunctivae, PERRL, hearing intact to voice, ET in place Neck:  Supple, without masses, thyroid non-tender Resp:  No accessory muscle use, bilateral breath sounds without wheezes rales or rhonchi 
Card:  No murmurs, normal S1, S2 without thrills, bruits or peripheral edema Abd:  Soft, non-tender, non-distended, reduced bowel sounds are present, no mass Lymph:  No cervical or inguinal adenopathy Musc:  No cyanosis or clubbing Skin:  No rashes or ulcers, skin turgor is good Neuro:  Cranial nerves are grossly intact, general motor weakness, does not follow commands appropriately Psych:  sedated Telemetry reviewed:   paced 
__________________________________________________________________ Medications Reviewed: (see below) Medications:  
 
Current Facility-Administered Medications Medication Dose Route Frequency  NOREPINephrine (LEVOPHED) 8 mg in 5% dextrose 250mL (32 mcg/mL) infusion  0.5-16 mcg/min IntraVENous TITRATE  metroNIDAZOLE (FLAGYL) IVPB premix 500 mg  500 mg IntraVENous Q12H  
 midazolam in normal saline (VERSED) 1 mg/mL infusion  0-10 mg/hr IntraVENous TITRATE  albuterol-ipratropium (DUO-NEB) 2.5 MG-0.5 MG/3 ML  3 mL Nebulization Q6H RT  
 midazolam (VERSED) injection 1 mg  1 mg IntraVENous Q2H PRN  
 cefTRIAXone (ROCEPHIN) 2 g in sterile water (preservative free) 20 mL IV syringe  2 g IntraVENous Q24H  
 famotidine (PF) (PEPCID) 20 mg in 0.9% sodium chloride 10 mL injection  20 mg IntraVENous Q24H  
 heparin (porcine) injection 5,000 Units  5,000 Units SubCUTAneous Q8H  
 fentaNYL citrate (PF) injection 50 mcg  50 mcg IntraVENous Q1H PRN  
 sodium chloride (NS) flush 5-40 mL  5-40 mL IntraVENous Q8H  
 sodium chloride (NS) flush 5-40 mL  5-40 mL IntraVENous PRN  
 acetaminophen (TYLENOL) tablet 650 mg  650 mg Oral Q6H PRN  polyethylene glycol (MIRALAX) packet 17 g  17 g Oral DAILY PRN  
 ondansetron (ZOFRAN) injection 4 mg  4 mg IntraVENous Q6H PRN Lab Data Reviewed: (see below) Lab Review:  
 
Recent Labs  
  04/27/21 
0430 04/25/21 
0419 WBC 6.6 7.6 HGB 8.8* 9.5* HCT 28.2* 29.8*  225 Recent Labs  
  04/27/21 
0430 04/25/21 
0909 04/24/21 
0820  135* 136  
K 4.2 4.5 4.3  107 108 CO2 26 22 21 * 132* 109* BUN 46* 38* 38* CREA 1.38* 1.40* 1.50* CA 8.1* 8.2* 8.5 MG 2.3  --   --   
ALB  --  2.2* 2.5* TBILI  --  0.2 0.4 ALT  --  39 34 Lab Results Component Value Date/Time Glucose (POC) 116 (H) 08/05/2014 08:59 PM  
 
Recent Labs  
  04/26/21 0453 04/25/21 
0352 PH 7.32* 7.33* PCO2 42 39 PO2 94 88 HCO3 21* 20* FIO2 40 40 No results for input(s): INR, INREXT, INREXT in the last 72 hours. All Micro Results Procedure Component Value Units Date/Time CULTURE, BLOOD, PAIRED [[id number]] Collected: 04/25/21 9875 Order Status: Completed Specimen: Blood Updated: 04/26/21 7532 Special Requests: NO SPECIAL REQUESTS Culture result: NO GROWTH AFTER 20 HOURS     
 CULTURE, BLOOD, PAIRED [[id number]] Collected: 04/24/21 0820 Order Status: Completed Specimen: Blood Updated: 04/26/21 3543 Special Requests: NO SPECIAL REQUESTS Culture result: NO GROWTH 2 DAYS     
 CULTURE, BLOOD [[id number]]  (Abnormal)  (Susceptibility) Collected: 04/22/21 0451 Order Status: Completed Specimen: Blood Updated: 04/25/21 6250 Special Requests: NO SPECIAL REQUESTS Culture result: STREPTOCOCCUS SANGUINIS GROWING IN BOTH BOTTLES DRAWN (SITE = L FOREARM) CULTURE, BLOOD [[id number]]  (Abnormal)  (Susceptibility) Collected: 04/22/21 0451 Order Status: Completed Specimen: Blood Updated: 04/25/21 0801 Special Requests: NO SPECIAL REQUESTS Culture result: STREPTOCOCCUS SANGUINIS GROWING IN BOTH BOTTLES DRAWN (SITE = L WRIST)  CULTURE, BLOOD, PAIRED [[id number]] Collected: 04/24/21 0730 Order Status: Canceled Specimen: Blood MRSA CULTURE [[id number]] Collected: 04/22/21 0100 Order Status: Completed Specimen: Nasal from Nares Updated: 04/23/21 3699 Special Requests: NO SPECIAL REQUESTS Culture result: MRSA NOT PRESENT Screening of patient nares for MRSA is for surveillance purposes and, if positive, to facilitate isolation considerations in high risk settings. It is not intended for automatic decolonization interventions per se as regimens are not sufficiently effective to warrant routine use. CULTURE, BLOOD, PAIRED [[id number]] Order Status: Canceled Specimen: Blood MICRO TRACKING [[id number]] Collected: 04/22/21 0451 Order Status: Completed Updated: 04/23/21 1218 MICRO TRACKING [[id number]] Collected: 04/22/21 0451 Order Status: Completed Updated: 04/23/21 4505 CULTURE, RESPIRATORY/SPUTUM/BRONCH Jacki Christine [[id number]] Collected: 04/23/21 0229 Order Status: Canceled Specimen: Sputum,ET Suction MICRO TRACKING [[id number]] Collected: 04/22/21 0451 Order Status: Completed Updated: 04/23/21 9774 MICRO TRACKING [[id number]] Collected: 04/22/21 0451 Order Status: Completed Updated: 04/22/21 2304 Other pertinent lab: none Total time spent with patient: 30 Minutes I personally reviewed chart, notes, data and current medications in the medical record. I have personally examined and treated the patient at bedside during this period. Care Plan discussed with: Patient, Nursing Staff, Consultant/Specialist and >50% of time spent in counseling and coordination of care Discussed:  Care Plan and D/C Planning Prophylaxis:  H2B/PPI Disposition:  [facility] 
        
___________________________________________________ Attending Physician: Charity Castillo MD

## 2021-04-27 NOTE — PROGRESS NOTES
Problem: Non-Violent Restraints Goal: Removal from restraints as soon as assessed to be safe Outcome: Resolved/Met Goal: No harm/injury to patient while restraints in use Outcome: Resolved/Met Goal: Patient's dignity will be maintained Outcome: Resolved/Met Goal: Patient Interventions Outcome: Resolved/Not Met

## 2021-04-27 NOTE — PROGRESS NOTES
1900- Bedside and Verbal shift change report received from [facility] (offgoing nurse) by Batool Cornelius RN (oncoming nurse). Report included the following information SBAR, Kardex, ED Summary, Procedure Summary, Intake/Output, MAR, Recent Results, Med Rec Status, Cardiac Rhythm paced, Alarm Parameters  and Quality Measures. Primary Nurse Batool Cornelius RN and MARTINEZ Wu performed a dual skin assessment on this patient No impairment noted. All drips verified. 2000- Patient shift assessment performed. Patient sedated and unable to assess orientation due to sedation and intubation. Does not follow commands and responds to pain. Unable to assss complaints of pain, nausea, vomiting, chest pain, or SOB. Pupils equal, round and reactive at 4mm. Facial symmetry intact. Unable to assess patient speech due to sedation and intubation. Unable to assess patient strength, but is unable to lift extremities from bed. All pulses present and palpable, +2 in upper and lower extremities. Skin warm, dry, and appropriate for race. Trace generalized edema present, non-pitting edema in bilateral upper extremities, +2 in right lower extremity, and +3 in left lower extremity. Patient breathing rate of 26 set on vent. No adventitious upper airway sounds. Lungs sound clear and diminished throughout on ventilator. Vent settings: PC mode with rate 26, peep 6, , and FiO2 40%. ETT 7.5 measures 23cm @ lip. OG tube measures 65cm @ lip. Vital HP @ 50mL/hr with q8h 50 mL water flush. Gastric residual of 10mL. Weak cough present with suctioning. Patient is incontinent of bladder and bowel. Abdomen soft and intact. Bowel sounds active x4. LBM 4/26/21. Dual skin assessment completed with MARTINEZ Deluna. Hannah skin abnormalities noted. All IVs patent, flushes well, with blood return. All pump settings verified. DVT ppx heparin and GI ppx pepcid. VAP bundle performed. Patient turned and resting comfortably.  Patient educated on call bell and patient safety measures. Call bell in reach, bed in low and locked position, and reminded patient to use call bell. Patient board updated. 2200- VAP bundle performed. Patient turned and resting comfortably. Patient had small, brown, smear on pad. Pad changed. 2347- Patient heart rate increased from 79 to 158. RASS +1, patient awake and restless. EKG performed showing sinus tachycardia. Versed and fent given according to mar. After fent, patient heart rate decreased from 158 to 60.     0010- Reassessment performed. No changes noted. VAP bundle performed. Patient turned and resting comfortably. RASS -1.    0100- Patient O2 sat in 80s. RT called into room. RT performed inline suctioning and lavage an dreadjusted tube. FiO2 increased to 70%. Patient O2 sat returned to above 95%. 0200- VAP bundle performed. Patient turned and resting comfortably. 0400- Reassessment performed. No changes noted. VAP bundle performed. Patient turned and resting comfortably. Labs drawn according to protocol and sent to lab. CHG bath given. 0600- VAP bundle performed. Patient turned and resting comfortably. 0700- Bedside and Verbal shift change report given to MARTINEZ Howell (oncoming nurse) by Vashti Hahn RN (offgoing nurse). Report included the following information SBAR, Kardex, ED Summary, Procedure Summary, Intake/Output, MAR, Recent Results, Med Rec Status, Cardiac Rhythm paced, Alarm Parameters  and Quality Measures.

## 2021-04-27 NOTE — PROGRESS NOTES
0700 Report received from previous shift. Pt resting in bed see Flowsheet and MAR for assessments and medications  0800 Tele intensivist rounded on patient. 0900 Cardiology at bedside stated ok to start working on extubation. 1000 SBT started by respiratory   1100 Pt tachypneic on SBT. IDR completed   1200 Pt placed back on regular vent settings. 1400 Surgery Dr. Loretta Mata at bedside. Hospitalist  1415 Dr. Iraida Joe at bedside   1600 Pt with moderate to large amount of clear oral secretions.  Dr. Raymundo Lund paged for scopolamine patch see orders  1800 Pt biting ETT and coughing pt medicated with bolus dose see MAR  1900 Report given to [address]

## 2021-04-27 NOTE — PROGRESS NOTES
1900- Bedside and Verbal shift change report received from [facility] (offgoing nurse) by Verona Cagle RN (oncoming nurse). Report included the following information SBAR, Kardex, ED Summary, Procedure Summary, Intake/Output, MAR, Recent Results, Med Rec Status, Cardiac Rhythm paced, Alarm Parameters  and Quality Measures. Primary Nurse Verona Cagle RN and MARTINEZ Wu performed a dual skin assessment on this patient No impairment noted. All drips verified. 2000- Patient shift assessment performed. Patient sedated and unable to assess orientation due to sedation and intubation. Does not follow commands and responds to pain. Unable to assss complaints of pain, nausea, vomiting, chest pain, or SOB. Pupils equal, round and reactive at 4mm. Facial symmetry intact. Unable to assess patient speech due to sedation and intubation. Unable to assess patient strength, but is unable to lift extremities from bed. All pulses present and palpable, +2 in upper and lower extremities. Skin warm, dry, and appropriate for race. Trace generalized edema present, non-pitting edema in bilateral upper extremities, +2 in right lower extremity, and +3 in left lower extremity. Patient breathing rate of 26 set on vent. No adventitious upper airway sounds. Lungs sound clear and diminished throughout on ventilator. Vent settings: PC mode with rate 26, peep 6, , and FiO2 40%. ETT 7.5 measures 23cm @ lip. OG tube measures 65cm @ lip. Vital HP @ 50mL/hr with q8h 50 mL water flush. Gastric residual of 10mL. Weak cough present with suctioning. Patient is incontinent of bladder and bowel. Abdomen soft and intact. Bowel sounds active x4. LBM 4/26/21. Dual skin assessment completed with MARTINEZ Deluna. Hannah skin abnormalities noted. All IVs patent, flushes well, with blood return. All pump settings verified. DVT ppx heparin and GI ppx pepcid. VAP bundle performed. Patient turned and resting comfortably.  Patient educated on call bell and patient safety measures. Call bell in reach, bed in low and locked position, and reminded patient to use call bell. Patient board updated. 2200- VAP bundle performed. Patient turned and resting comfortably. Patient had small, brown, smear on pad. Pad changed. 0000- Reassessment performed. No changes noted. VAP bundle performed. Patient turned and resting comfortably. 0200- VAP bundle performed. Patient turned and resting comfortably. 0400- Reassessment performed. No changes noted. VAP bundle performed. Patient turned and resting comfortably. Labs drawn according to protocol and sent to lab. 
 
0500- Full soap & water/CHG bath given. 0600- VAP bundle performed. Patient turned and resting comfortably. 0700- Bedside and Verbal shift change report given to Emerald Lao RN(oncoming nurse) by Ke Yoo RN (offgoing nurse). Report included the following information SBAR, Kardex, ED Summary, Procedure Summary, Intake/Output, MAR, Recent Results, Med Rec Status, Cardiac Rhythm paced, Alarm Parameters  and Quality Measures.

## 2021-04-27 NOTE — PROGRESS NOTES
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 Per cardiology note RAMAN cannot rule out AV vegetation. CTA planned to assess for perivalvular aortic abscess. Continue ceftriaxone. Repeat blood cultures sterile to date. · Fever. Etiology unclea although suspect due to above. .  Repeat blood cultures NGSF. UA bland CT C-A-P relatively unremarkable with exception of gallbladder. No obvious source of infection or occult abscesses on imaging. May need to change transvenous pacer device if fevers persist.  General surgery to assess for possible acalculous cholecystitis. · Abnormal gallbladder on CT. Ultrasound reveals thickened gallbladder wall with pericholecystic fluid . With ongoing unexplained fevers and abnormal LFTs acalculous cholecystitis is in the differential.  Consider general surgery evaluation. Added anaerobic coverage with metronidazole  · Third-degree heart block. Temporary transvenous pacemaker emergently placed . Patient will need dual-chamber PPM per cardiology. I am concerned for endocarditis as outlined above. Timing of PPM not yet determined but will need sterile blood cultures and will need to rule out endocarditis prior to placement  · BARAK/CKD          Subjective: Intubated and sedated.   Remains febrile    Objective:     Vitals:   Visit Vitals  BP (!) 107/40   Pulse 61   Temp 100 °F (37.8 °C)   Resp (!) 38   Ht 5' 7\" (1.702 m)   Wt 199 lb (90.3 kg)   SpO2 95%   BMI 31.17 kg/m²        Tmax:  Temp (24hrs), Av.4 °F (38 °C), Min:99.9 °F (37.7 °C), Max:101.2 °F (38.4 °C)      Exam:   Patient is intubated:  no    Physical Examination: General:   Sedated on vent   Head:  Normocephalic, atraumatic. Eyes:  Conjunctivae clear   Neck: Supple       Lungs:   No distress. Clear to auscultation anteriorly   Chest wall:     Heart:  Regular rate and rhythm. 4/6 BRIAN   Abdomen:   Soft, non-tender, non-distended   Extremities:  No edema. Right femoral vein transvenous pacer site unremarkable   Skin:  No rash   Neurologic:  Unable to assess     Labs:        No lab exists for component: ITNL   No results for input(s): CPK, CKMB, TROIQ in the last 72 hours. Recent Labs     04/27/21  0430 04/25/21  0909 04/25/21  0419    135*  --    K 4.2 4.5  --     107  --    CO2 26 22  --    BUN 46* 38*  --    CREA 1.38* 1.40*  --    * 132*  --    MG 2.3  --   --    ALB  --  2.2*  --    WBC 6.6  --  7.6   HGB 8.8*  --  9.5*   HCT 28.2*  --  29.8*     --  225     No results for input(s): INR, PTP, APTT, INREXT, INREXT in the last 72 hours.   Needs: urine analysis, urine sodium, protein and creatinine  Lab Results   Component Value Date/Time    Creatinine, urine 148.1 02/21/2014 10:54 AM         Cultures:     No results found for: SATYA  Lab Results   Component Value Date/Time    Culture result: NO GROWTH 2 DAYS 04/25/2021 09:09 AM    Culture result: NO GROWTH 3 DAYS 04/24/2021 08:20 AM    Culture result: (A) 04/22/2021 04:51 AM     STREPTOCOCCUS SANGUINIS GROWING IN BOTH BOTTLES DRAWN (SITE = L WRIST)    Culture result: (A) 04/22/2021 04:51 AM     STREPTOCOCCUS SANGUINIS GROWING IN BOTH BOTTLES DRAWN (SITE = L FOREARM)       Radiology:     Medications       Current Facility-Administered Medications   Medication Dose Route Frequency Last Admin    furosemide (LASIX) injection 40 mg  40 mg IntraVENous BID 40 mg at 04/27/21 0928    metroNIDAZOLE (FLAGYL) IVPB premix 500 mg  500 mg IntraVENous Q12H 500 mg at 04/27/21 0044    midazolam in normal saline (VERSED) 1 mg/mL infusion  0-10 mg/hr IntraVENous TITRATE Stopped at 04/27/21 0928    albuterol-ipratropium (DUO-NEB) 2.5 MG-0.5 MG/3 ML  3 mL Nebulization Q6H RT 3 mL at 04/27/21 0735    midazolam (VERSED) injection 1 mg  1 mg IntraVENous Q2H PRN 1 mg at 04/24/21 1546    cefTRIAXone (ROCEPHIN) 2 g in sterile water (preservative free) 20 mL IV syringe  2 g IntraVENous Q24H 2 g at 04/27/21 0805    famotidine (PF) (PEPCID) 20 mg in 0.9% sodium chloride 10 mL injection  20 mg IntraVENous Q24H 20 mg at 04/27/21 5321    heparin (porcine) injection 5,000 Units  5,000 Units SubCUTAneous Q8H 5,000 Units at 04/27/21 0654    fentaNYL citrate (PF) injection 50 mcg  50 mcg IntraVENous Q1H PRN 50 mcg at 04/24/21 0019    sodium chloride (NS) flush 5-40 mL  5-40 mL IntraVENous Q8H 10 mL at 04/27/21 0654    sodium chloride (NS) flush 5-40 mL  5-40 mL IntraVENous PRN      acetaminophen (TYLENOL) tablet 650 mg  650 mg Oral Q6H  mg at 04/27/21 0211    polyethylene glycol (MIRALAX) packet 17 g  17 g Oral DAILY PRN      ondansetron (ZOFRAN) injection 4 mg  4 mg IntraVENous Q6H PRN             Case discussed with: Nursing, respiratory, and cardiology      Mel Shah DO

## 2021-04-27 NOTE — PROGRESS NOTES
Critical Care Progress Note Date:2021       Room:[address] [place] [address] Patient Swapnil Clemens     YOB: 1931     Age:89 y.o. Subjective Subjective:  
:  No acute events overnight. TVP at 61. RAMAN and CT yesterday with no abscess seen. On 2 of versed. Doesn't follow commands. :  No acute events overnight. TVP at 60 with 100% capture. On levophed and versed. Propofol is off. On TF tolerating well.  
 
: No major events overnight. Sedated on propofol. On Levophed @6. Paced @ 60. On vent support: 20/400/40/8. CXR stable AB.33//. Cr down to 1.4 UO:1 L On Tf @ 30,goal of 50 and tolerating. Hb and Plts stable T max: 100. Bcx  :with STREPTOCOCCUS SANGUINIS. sensitivity noted. Repeat Bcx sent. I/O=3/3/1.0=+2.3L   
 
: No major events overnight. Sedated on propofol due to periods of agitation. Receiving Fentanyl PRN. On Levophed. LA 0.8 On Vent support: PRVC 20/400/40/8. AB.4//. CXR stable. Cr  Trending down to 1.5 UO: 1L Tolerating TF. Hb  Stable 9.8 Plts stable 193 WBCs normal,Febrile (102) on ice pack. Repeat CX sent. ID consulted,Daptomycin added. I/O=2.3/1.0=+1.3L 
 
: Sedated on propofol. CT head:no acute process. On low dose of Levophed. BNP 8800. Pacemaker in place. On VCV: 20/500/60/8. Cr down from 2.17 to 1.72 UO: 0.7L NPO Hb down from 10.4 to 9.9 Plts stable 191 T max: 102.1 ( @ 21:00), T :100.2 this morning. WBCs normal. I/O=+1.5L Review of Systems: Unable to obtain given sedation Objective Vitals Last 24 Hours: TEMPERATURE:  Temp  Av.4 °F (38 °C)  Min: 99.9 °F (37.7 °C)  Max: 101.2 °F (38.4 °C) RESPIRATIONS RANGE: Resp  Av.1  Min: 16  Max: 38 
PULSE OXIMETRY RANGE: SpO2  Av %  Min: 90 %  Max: 99 % PULSE RANGE: Pulse  Av.3  Min: 56  Max: 65 BLOOD PRESSURE RANGE: Systolic (84TSF), HBR:703 , Min:96 , RL  
; Diastolic (48YYL), NQY:35, Min:20, Max:77 I/O (24Hr):  
 
Intake/Output Summary (Last 24 hours) at 4/27/2021 1016 Last data filed at 4/27/2021 0900 Gross per 24 hour Intake 1812.91 ml Output 1555 ml Net 257.91 ml Physical Exam: 
 
 
I examined the patient via telemedicine, with its associated limitations. I reviewed the electronic medical record, the x-rays, labs, progress notes, previous history and physicals and consultation notes that were available in the electronic medical record. I beamed in and examined the patient with assistance of house staff On exam: 
 
Sedated Intubated SR on monitor Decreased breath sounds bilateral at bases Abdomen soft Introducer in groin Objective: 
Vital signs: (most recent): Blood pressure (!) 127/53, pulse 60, temperature 100 °F (37.8 °C), resp. rate 26, height 5' 7\" (1.702 m), weight 90.3 kg (199 lb), SpO2 96 %. Labs/Imaging/Diagnostics Labs: CBC: 
Recent Labs  
  04/27/21 
0430 04/25/21 
0419 WBC 6.6 7.6  
RBC 3.01* 3.23* HGB 8.8* 9.5* HCT 28.2* 29.8* MCV 93.7 92.3 RDW 14.0 13.9  225 CHEMISTRIES: 
Recent Labs  
  04/27/21 
0430 04/25/21 
6476  135* K 4.2 4.5  
 107 CO2 26 22 BUN 46* 38* CA 8.1* 8.2* MG 2.3  --   
PT/INR: 
No results for input(s): INR, INREXT, INREXT in the last 72 hours. No lab exists for component: PROTIME APTT: 
No results for input(s): APTT in the last 72 hours. LIVER PROFILE: 
Recent Labs  
  04/25/21 
2437 AST 38* ALT 39 Lab Results Component Value Date/Time ALT (SGPT) 39 04/25/2021 09:09 AM  
 AST (SGOT) 38 (H) 04/25/2021 09:09 AM  
 Alk. phosphatase 154 (H) 04/25/2021 09:09 AM  
 Bilirubin, total 0.2 04/25/2021 09:09 AM  
 
 
Imaging Last 24 Hours: 
Cta Chest W Or W Wo Cont Result Date: 4/26/2021 EXAM:  CTA CHEST W OR W WO CONT INDICATION: Questionable area of abscess COMPARISON: No comparisons TECHNIQUE: Helical thin section chest CT following uneventful intravenous administration of nonionic contrast 80 mL of isovue 370 according to departmental PE protocol. Coronal and sagittal reformats were performed. 3D post processing was performed. CT dose reduction was achieved through the use of a standardized protocol tailored for this examination and automatic exposure control for dose modulation. FINDINGS: This is a limited quality study for the evaluation of pulmonary embolism to the proximal segmental arterial level. There is no pulmonary embolism to this level. THYROID: No nodule. MEDIASTINUM: No mass or lymphadenopathy. ABELINO: No mass or lymphadenopathy. THORACIC AORTA: No aneurysm. HEART: Coronary atherosclerotic disease ESOPHAGUS: No wall thickening or dilatation. TRACHEA/BRONCHI: Patent. PLEURA: Bilateral pleural effusions LUNGS: Bilateral lower lobe volume loss UPPER ABDOMEN: Partially imaged. No acute pathology. BONES: No aggressive bone lesion or fracture. 1.  Limitations as above. No evidence of pulmonary embolus. 2.  Bilateral pleural effusions and bilateral lower lobe volume loss Echo Sean W Or Wo Contrast 
 
Result Date: 4/26/2021 · LV: Estimated LVEF is 55 - 60%. Normal cavity size, wall thickness and systolic function (ejection fraction normal). Wall motion: normal. · Aortic Valve: There is severe noncoronary leaflet calcification. There is moderate aortic stenosis. Trace aortic valve regurgitation. There is no obvious vegetation on the aortic valve, however it is difficult to rule out endocarditis on the severely calcified non-coronary cusp. There is slight thickening of the aortic root, however no abscess is seen. · There is no obvious vegetation on the aortic valve, however it is difficult to rule out endocarditis on the severely calcified non-coronary cusp. Will review images with my colleagues. Assessment//Plan Active Problems: Hypertension (8/1/2013) Hyperlipidemia (8/1/2013) BPH (benign prostatic hyperplasia) (8/1/2013) Hypoxia (4/21/2021)   Third degree heart block ([address] (4/21/2021) Bradycardia (4/21/2021) Elevated troponin (4/21/2021) BARAK (acute kidney injury) ([address] (4/21/2021) Cardiogenic shock ([address] (4/21/2021) Asystole ([address] (4/21/2021) Ventricular fibrillation ([address] (4/21/2021) Assessment & Plan Assessment:  
Sp Cardiac Arrest with ROSC Acute Resp Failure with hypoxia CHB s/p temporary Pacemaker BARAK-improved Shock,circulatory Bacteremia: viridans strep Plan: 
Continue versed and Fentyanyl PRN Paced. Cardiology following. Off pressors. Will need PPM once bacteremia resolved and cleared by ID. Vent support. Adjust RR and TV based on ABG. O2 and PEEP to keep Sat>92%. Unable to proceed with PSV given agitation once sedation weaned and risk of pacemaker dislodgement from groin. Follow ABG and CXR. Monitor UO and renal indices. Continue TF. Bowel regimen Monitor H/H and Plts On ceftriaxone and flagyl. ID following Lasix 40 mg IV BID Consult General surgery to eval to possible acalculous isiah. SQ Heparin/Pepcid Poor prognosis I performed all aspects of the physical examination via Telemedicine associated with two way audio and video communication and with the on-site assistance of  the bedside nurse. I am located in [place] and the patient is located in [facility]. The patient is critically ill in the ICU. I  personally  reviewed the pertinent medical records, laboratory/ pathology data and radiographic images. The decision making regarding this patient is as documented above, which was generated  following  discussion  with the multidisciplinary ICU team and creation of a treatment plan for  the patient. We discussed the patient's interval history and future coordination of care and  plans. The patient's medications  were reviewed and changes made as stipulated above.   Due to  critical illness impairing one or more vital organs of this patient resulting in life threatening clinical situation  I have provided direct, frequent personal  assessment and manipulation in management plan and spent 35 minutes  of  critical care time excluding the time spent on procedures and teaching. Greater than 50% of this time  in patients care was  employed  in counseling and coordination of care and engaged in face to face discussion of case management issues, addressing questions, and outlining a plan of  therapy. Pt at risk of life threatening deterioration from circulatory shock. Pt seen and evaluated via tele encounter. Audio and Video were used for this interaction.  
 
 
Electronically signed by Jeremy Jara DO on 4/27/2021 at 12:58 PM

## 2021-04-28 NOTE — PROGRESS NOTES
[facility] Medical Progress Note NAME: Noland Olszewski :  1931 MRM:  [id number] Date/Time of service 2021  2:02 PM   
 
  
Assessment and Plan:  
 
Acute respiratory failure - POA post arrest. Remains intubated. Vent management per intensivist.  
  
Third degree heart block / Ventricular fibrillation / post Cardiopulmonary arrest / Elevated troponin / Hx Hypertension - POA, likely due to endocarditis. Emergently had pacer placed and  had it switched out from femoral to subclavian. Cardiology managing. No abscess seen on RAMAN , CTA chest on  does not comment on any heart abscess, but still high suspicion. Cath unremarkable. Getting repeat RAMAN 
  
Streptococcus Bacteremia - POA, due to endocarditis, vs gallbladder. Consulted ID who are managing antibiotics. They consulted general surgery. Continue high dose ceftriaxone and flagyl. Repeat blood cx NGTD Septic Shock - Likely cardiogenic and septic. Weaned of levophed. Improving fever. Anemia - POA and now moderate and dropping slowly. Check serologies. 
  
BARAK / CKD 3 - POA, due to hypoperfusion, now stable after IVF. Avoid nephrotoxins BPH (benign prostatic hyperplasia) - Monitor for obstruction Gallbladder thickening - Outpatient follow up Hyperlipidemia - Holding meds Subjective: Chief Complaint:  Cannot obtain, tolerated pacer switch. ROS: 
(bold if positive, if negative) Not Tolerating PT  Not Tolerating Diet Objective:  
 
Last 24hrs VS reviewed since prior progress note. Most recent are: 
 
Visit Vitals BP (!) 135/101 Pulse (!) 158 Temp 99.2 °F (37.3 °C) Resp 30 Ht 5' 7\" (1.702 m) Wt 90.3 kg (199 lb) SpO2 93% BMI 31.17 kg/m² SpO2 Readings from Last 6 Encounters:  
21 93% 07/10/19 95% 18 93% 18 96% 18 97% 18 98% O2 Flow Rate (L/min): 10 l/min Intake/Output Summary (Last 24 hours) at 2021 1402 Last data filed at 4/28/2021 0600 Gross per 24 hour Intake 1170 ml Output 1745 ml Net -575 ml Physical Exam: 
 
Gen:  Obese, in no acute distress HEENT:  Pink conjunctivae, PERRL, hearing intact to voice, ET in place Neck:  Supple, without masses, thyroid non-tender Resp:  No accessory muscle use, bilateral breath sounds without wheezes rales or rhonchi 
Card:  No murmurs, normal S1, S2 without thrills, bruits or peripheral edema Abd:  Soft, non-tender, non-distended, reduced bowel sounds are present, no mass Lymph:  No cervical or inguinal adenopathy Musc:  No cyanosis or clubbing Skin:  No rashes or ulcers, skin turgor is good Neuro:  Cranial nerves are grossly intact, general motor weakness, does not follow commands appropriately Psych:  sedated Telemetry reviewed:   paced 
__________________________________________________________________ Medications Reviewed: (see below) Medications:  
 
Current Facility-Administered Medications Medication Dose Route Frequency  fentaNYL (PF) 1,500 mcg/30 mL (50 mcg/mL) infusion  0-50 mcg/hr IntraVENous TITRATE  adenosine (ADENOCARD) injection 6 mg  6 mg IntraVENous ONCE  
 furosemide (LASIX) injection 40 mg  40 mg IntraVENous BID  scopolamine (TRANSDERM-SCOP) 1 mg over 3 days 1 Patch  1 Patch TransDERmal Q72H  levothyroxine (SYNTHROID) tablet 50 mcg  50 mcg Per NG tube 6am  
 pantoprazole (PROTONIX) 40 mg in 0.9% sodium chloride 10 mL injection  40 mg IntraVENous DAILY  metroNIDAZOLE (FLAGYL) IVPB premix 500 mg  500 mg IntraVENous Q12H  
 albuterol-ipratropium (DUO-NEB) 2.5 MG-0.5 MG/3 ML  3 mL Nebulization Q6H RT  
 midazolam (VERSED) injection 1 mg  1 mg IntraVENous Q2H PRN  
 cefTRIAXone (ROCEPHIN) 2 g in sterile water (preservative free) 20 mL IV syringe  2 g IntraVENous Q24H  
 heparin (porcine) injection 5,000 Units  5,000 Units SubCUTAneous Q8H  
 fentaNYL citrate (PF) injection 50 mcg  50 mcg IntraVENous Q1H PRN  
 sodium chloride (NS) flush 5-40 mL  5-40 mL IntraVENous Q8H  
 sodium chloride (NS) flush 5-40 mL  5-40 mL IntraVENous PRN  
 acetaminophen (TYLENOL) tablet 650 mg  650 mg Oral Q6H PRN  polyethylene glycol (MIRALAX) packet 17 g  17 g Oral DAILY PRN  
 ondansetron (ZOFRAN) injection 4 mg  4 mg IntraVENous Q6H PRN Lab Data Reviewed: (see below) Lab Review:  
 
Recent Labs  
  04/28/21 
0316 04/27/21 
0430 WBC 6.3 6.6 HGB 8.7* 8.8* HCT 27.9* 28.2*  
 240 Recent Labs  
  04/28/21 
0316 04/27/21 
0430  138  
K 4.2 4.2  108 CO2 26 26 * 123* BUN 57* 46* CREA 1.44* 1.38* CA 8.2* 8.1*  
MG 2.5* 2.3 Lab Results Component Value Date/Time Glucose (POC) 116 (H) 08/05/2014 08:59 PM  
 
Recent Labs  
  04/28/21 
0356 04/26/21 0453 PH 7.40 7.32* PCO2 40 42 PO2 120* 94 HCO3 24 21* FIO2 70% 40 No results for input(s): INR, INREXT, INREXT in the last 72 hours. All Micro Results Procedure Component Value Units Date/Time CULTURE, BLOOD, PAIRED [[id number]] Collected: 04/27/21 1026 Order Status: Completed Specimen: Blood Updated: 04/28/21 7180 Special Requests: NO SPECIAL REQUESTS Culture result: NO GROWTH AFTER 21 HOURS     
 CULTURE, BLOOD, PAIRED [[id number]] Collected: 04/25/21 9503 Order Status: Completed Specimen: Blood Updated: 04/28/21 6518 Special Requests: NO SPECIAL REQUESTS Culture result: NO GROWTH 3 DAYS     
 CULTURE, BLOOD, PAIRED [[id number]] Collected: 04/24/21 0820 Order Status: Completed Specimen: Blood Updated: 04/28/21 5925 Special Requests: NO SPECIAL REQUESTS Culture result: NO GROWTH 4 DAYS     
 CULTURE, BLOOD [[id number]]  (Abnormal)  (Susceptibility) Collected: 04/22/21 0451 Order Status: Completed Specimen: Blood Updated: 04/25/21 0397 Special Requests: NO SPECIAL REQUESTS Culture result:   STREPTOCOCCUS SANGUINIS GROWING IN BOTH BOTTLES DRAWN (SITE = L FOREARM) CULTURE, BLOOD [[id number]]  (Abnormal)  (Susceptibility) Collected: 04/22/21 0451 Order Status: Completed Specimen: Blood Updated: 04/25/21 0801 Special Requests: NO SPECIAL REQUESTS Culture result: STREPTOCOCCUS SANGUINIS GROWING IN BOTH BOTTLES DRAWN (SITE = L WRIST) CULTURE, BLOOD, PAIRED [[id number]] Collected: 04/24/21 0730 Order Status: Canceled Specimen: Blood MRSA CULTURE [[id number]] Collected: 04/22/21 0100 Order Status: Completed Specimen: Nasal from Nares Updated: 04/23/21 0376 Special Requests: NO SPECIAL REQUESTS Culture result: MRSA NOT PRESENT Screening of patient nares for MRSA is for surveillance purposes and, if positive, to facilitate isolation considerations in high risk settings. It is not intended for automatic decolonization interventions per se as regimens are not sufficiently effective to warrant routine use. CULTURE, BLOOD, PAIRED [[id number]] Order Status: Canceled Specimen: Blood MICRO TRACKING [[id number]] Collected: 04/22/21 0451 Order Status: Completed Updated: 04/23/21 8340 MICRO TRACKING [[id number]] Collected: 04/22/21 0451 Order Status: Completed Updated: 04/23/21 3253 CULTURE, RESPIRATORY/SPUTUM/BRONCH Syed Reece [[id number]] Collected: 04/23/21 3580 Order Status: Canceled Specimen: Sputum,ET Suction MICRO TRACKING [[id number]] Collected: 04/22/21 0451 Order Status: Completed Updated: 04/23/21 6532 MICRO TRACKING [[id number]] Collected: 04/22/21 0451 Order Status: Completed Updated: 04/22/21 2305 Other pertinent lab: none Total time spent with patient: 30 Minutes I personally reviewed chart, notes, data and current medications in the medical record. I have personally examined and treated the patient at bedside during this period.  
              
Care Plan discussed with: Patient, Nursing Staff, Consultant/Specialist and >50% of time spent in counseling and coordination of care Discussed:  Care Plan and D/C Planning Prophylaxis:  H2B/PPI Disposition:  [facility] 
        
___________________________________________________ Attending Physician: Jonny Morris MD

## 2021-04-28 NOTE — PROGRESS NOTES
Transition of care note:    RUR 19%-moderate risk for a future readmission    LOS 7 days GLOS 5.3    Pt remains intubated (Peep 6,fio2 50%)  Pt had his temporary  Transvenous pacer changed out this morning,.(wire/site)    RAMAN planned for tomorrow @ 12:30 pm.    I met with pt.'d daughter this am and reviewed plan of care. The plan is for pt to go to [facility] for rehab when stable in the future for discharge. Pt 's infection will need to be treated before a permanent pacemaker can be placed. ID is involved.     Georgie Quintero  Case management  [phone]

## 2021-04-28 NOTE — PROGRESS NOTES
0700- Bedside and Verbal shift change report given to [address] [place] [address] (oncoming nurse) by Dandre Gonzalez and Enoch Morales RN (offgoing nurse). Report included the following information SBAR, Kardex, ED Summary, Procedure Summary, Intake/Output, MAR, Accordion, Recent Results, Med Rec Status, Cardiac Rhythm SR paced and Alarm Parameters . Primary Nurse Epifanio Bruno and Enoch Morales, RN performed a dual skin assessment on this patient No impairment noted  Fabio score is 10   0800- Shift assessment completed; see flowsheet   1130 - SVT 160s, O2 desaturation 88%, pt. Moving extremities. Versed 1 mg x 2 pushed (per Laura RAMIREZ) and fentanyl 50 mcg pushed for sedation. Laura RAMIREZ on tele monitor, [facility] NP at bedside and [facility] order for adenosine 6 mg. Adenosine 6 mg pushed in closest IV with arm raised. HR to 80 SR now. O2 100% boost. Pt now HR 83, /70, O2 90%. 1300- Bedside and Verbal shift change report given to Jeremiah Winkler (oncoming nurse) by Juanpablo Galvez RN (offgoing nurse). Report included the following information SBAR, Kardex, ED Summary, Procedure Summary, Intake/Output, MAR, Accordion, Recent Results, Med Rec Status, Cardiac Rhythm SR paced and Alarm Parameters .

## 2021-04-28 NOTE — PROGRESS NOTES
Critical Care     Progress Note  Date:2021       Room:CCL/PL  Patient Maged Luciano     YOB: 1931     Age:89 y.o. Subjective    Subjective:   :  Still having fevers. Pt got temp TVP changed from femoral to IJ this morning. No following commands. Off versed. Requiring frequent fentanyl pushes overnight. RAMAN planned for Thursday. :  No acute events overnight. TVP at 61. RAMAN and CT yesterday with no abscess seen. On 2 of versed. Doesn't follow commands. :  No acute events overnight. TVP at 60 with 100% capture. On levophed and versed. Propofol is off. On TF tolerating well.     : No major events overnight. Sedated on propofol. On Levophed @6. Paced @ 60. On vent support: 20/400/40/8. CXR stable AB.33/39/88. Cr down to 1.4 UO:1 L On Tf @ 30,goal of 50 and tolerating. Hb and Plts stable T max: 100. Bcx  :with STREPTOCOCCUS SANGUINIS. sensitivity noted. Repeat Bcx sent. I/O=3/3/1.0=+2.3L      : No major events overnight. Sedated on propofol due to periods of agitation. Receiving Fentanyl PRN. On Levophed. LA 0.8 On Vent support: PRVC 20/400/40/8. AB.4/31/86. CXR stable. Cr  Trending down to 1.5 UO: 1L Tolerating TF. Hb  Stable 9.8 Plts stable 193 WBCs normal,Febrile (102) on ice pack. Repeat CX sent. ID consulted,Daptomycin added. I/O=2.3/1.0=+1.3L    : Sedated on propofol. CT head:no acute process. On low dose of Levophed. BNP 8800. Pacemaker in place. On VCV: 20/500/60/8. Cr down from 2.17 to 1.72 UO: 0.7L NPO Hb down from 10.4 to 9.9 Plts stable 191   T max: 102.1 ( @ 21:00), T :100.2 this morning.  WBCs normal. I/O=+1.5L     Review of Systems: Unable to obtain given sedation   Objective         Vitals Last 24 Hours:  TEMPERATURE:  Temp  Av.1 °F (37.8 °C)  Min: 99.2 °F (37.3 °C)  Max: 100.5 °F (38.1 °C)  RESPIRATIONS RANGE: Resp  Av.2  Min: 16  Max: 49  PULSE OXIMETRY RANGE: SpO2  Av.8 %  Min: 92 %  Max: 100 %  PULSE RANGE: Pulse  Av.9  Min: 59  Max: 160  BLOOD PRESSURE RANGE: Systolic (21RSH), WWP:019 , Min:92 , CKF:691   ; Diastolic (84NYS), AWH:78, Min:27, Max:123    I/O (24Hr): Intake/Output Summary (Last 24 hours) at 2021 1041  Last data filed at 2021 0600  Gross per 24 hour   Intake 1420 ml   Output 2245 ml   Net -825 ml     Physical Exam:      I examined the patient via telemedicine, with its associated limitations. I reviewed the electronic medical record, the x-rays, labs, progress notes, previous history and physicals and consultation notes that were available in the electronic medical record. I beamed in and examined the patient with assistance of house staff     On exam:    Sedated  Intubated  SR on monitor  Decreased breath sounds bilateral at bases  Abdomen soft  Introducer in neck     Objective:  Vital signs: (most recent): Blood pressure (!) 125/50, pulse 64, temperature 99.2 °F (37.3 °C), resp. rate 30, height 5' 7\" (1.702 m), weight 90.3 kg (199 lb), SpO2 95 %. Labs/Imaging/Diagnostics    Labs:  CBC:  Recent Labs     21  0316 21  0430   WBC 6.3 6.6   RBC 2.98* 3.01*   HGB 8.7* 8.8*   HCT 27.9* 28.2*   MCV 93.6 93.7   RDW 14.0 14.0    240     CHEMISTRIES:  Recent Labs     21  0316 21  0430    138   K 4.2 4.2    108   CO2 26 26   BUN 57* 46*   CA 8.2* 8.1*   MG 2.5* 2.3   PT/INR:  No results for input(s): INR, INREXT, INREXT in the last 72 hours. No lab exists for component: PROTIME  APTT:  No results for input(s): APTT in the last 72 hours. LIVER PROFILE:  No results for input(s): AST, ALT in the last 72 hours. No lab exists for component: BUD Philip  Lab Results   Component Value Date/Time    ALT (SGPT) 39 2021 09:09 AM    AST (SGOT) 38 (H) 2021 09:09 AM    Alk.  phosphatase 154 (H) 2021 09:09 AM    Bilirubin, total 0.2 2021 09:09 AM       Imaging Last 24 Hours:  Xr Chest [facility] Date: 4/28/2021  Clinical history: INTUBATION INDICATION:   INTUBATION COMPARISON: 4/26/2021 FINDINGS: AP portable upright view of the chest demonstrates a stable  cardiopericardial silhouette. Bibasilar atelectasis and effusion. ET tube and NG tube are stable. There is no pneumothorax. . Patient is on a cardiac monitor. No significant change. Assessment//Plan   Active Problems:    Hypertension (8/1/2013)      Hyperlipidemia (8/1/2013)      BPH (benign prostatic hyperplasia) (8/1/2013)      Hypoxia (4/21/2021)      Third degree heart block ([address] (4/21/2021)      Bradycardia (4/21/2021)      Elevated troponin (4/21/2021)      BARAK (acute kidney injury) ([address] (4/21/2021)      Cardiogenic shock ([address] (4/21/2021)      Asystole ([address] (4/21/2021)      Ventricular fibrillation ([address] (4/21/2021)      Assessment & Plan     Assessment:   Sp Cardiac Arrest with ROSC  Acute Resp Failure with hypoxia  CHB s/p temporary Pacemaker  BARAK-improved  Shock,circulatory  Bacteremia: viridans strep    Plan:  Start Fentyanyl gtt and continue PRN pushes. Paced. Cardiology following. Off pressors. Will need PPM once bacteremia resolved and cleared by ID. Vent support. Adjust RR and TV based on ABG. O2 and PEEP to keep Sat>92%. Unable to proceed with PSV given agitation once sedation weaned and risk of pacemaker dislodgement from groin. Follow ABG and CXR. Monitor UO and renal indices. Continue TF. Bowel regimen  Monitor H/H and Plts   On ceftriaxone and flagyl. ID following  Lasix 40 mg IV BID  RAMAN planned for Thursday    SQ Heparin/Pepcid   Poor prognosis     I performed all aspects of the physical examination via Telemedicine associated with two way audio and video communication and with the on-site assistance of  the bedside nurse. I am located in [place] and the patient is located in [facility] at [address]. The patient is critically ill in the ICU.    I  personally  reviewed the pertinent medical records, laboratory/ pathology data and radiographic images. The decision making regarding this patient is as documented above, which was generated  following  discussion  with the multidisciplinary ICU team and creation of a treatment plan for  the patient. We discussed the patient's interval history and future coordination of care and  plans. The patient's medications  were reviewed and changes made as stipulated above. Due to  critical illness impairing one or more vital organs of this patient resulting in life threatening clinical situation  I have provided direct, frequent personal  assessment and manipulation in management plan and spent 35 minutes  of  critical care time excluding the time spent on procedures and teaching. Greater than 50% of this time  in patients care was  employed  in counseling and coordination of care and engaged in face to face discussion of case management issues, addressing questions, and outlining a plan of  therapy. Pt at risk of life threatening deterioration from circulatory shock. Pt seen and evaluated via tele encounter. Audio and Video were used for this interaction.       Electronically signed by Luzma Valencia DO on 4/28/2021 at 12:58 PM

## 2021-04-28 NOTE — PROGRESS NOTES
Cardiology Progress Note       ICU 
NAME:  Bruce Robison :   1931 MRN:   [id number] Critically ill Assessment/Plan: 1. Strep sanguinous bacteria: RAMAN with no clear abcess or endocarditis. CT without abcess. Still have high suspicion for paravalvular abscess. ABX  Per ID. T max 100.5 overnight. Plan to exchange pacer wire today. Will repeat RAMAN Thursday. 2. 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  
3. Acute resp failure: vent mgt per intensivist. Failed SBT   
 
 
  
 
Subjective:  
Cardiac ROS: intubated/sedated Previous Cardiac Eval 
21 ECHO RAMAN W OR WO CONTRAST 2021 Narrative · LV: Estimated LVEF is 55 - 60%. Normal cavity size, wall thickness and  
systolic function (ejection fraction normal). Wall motion: normal. 
· Aortic Valve: There is severe noncoronary leaflet calcification. There  
is moderate aortic stenosis. Trace aortic valve regurgitation. There is no  
obvious vegetation on the aortic valve, however it is difficult to rule  
out endocarditis on the severely calcified non-coronary cusp. There is  
slight thickening of the aortic root, however no abscess is seen. · There is no obvious vegetation on the aortic valve, however it is  
difficult to rule out endocarditis on the severely calcified non-coronary  
cusp. Will review images with my colleagues. Signed by: Bianka Zavaleta MD  
 
21 CARDIAC PROCEDURE 2021 Narrative R CFA - micropuncture; ultrasound, fluoroscopic guided access - 6 F sheath 
R FV -  micropuncture; ultrasound, fluoroscopic guided access; 9 F cordis 
  
5 F balloon tip/Temp pacer wire inserted - 60 bpm/5 mA  
  
  
L Main: Short; Nml 
  
LAD: Med; MLI; D1 - MLI 
  
LCflex: Med; MLI 
  
RCA: Med Dominant; Mid 50%;  Distal stent patent; PLB - TAM; PDA - small -  
ostial 80% 
  
LVEDP: 26 mm Hg 
  
LVEF: Hand injection / No well visualised 
  
Straight wire used to cross valve - AV - mod stenosis - Peak to peak  
gradient 30 mm Hg PCI: none 
  
  
Specimens Removed : None 
  
Complications: None 
  
Closure Device: R CFA - manual 
  
R FV - Cordis sutured in situ 
  
Pt was in SVT - prob sinus tach vs aflutter with 2:1 block  - 130bpm - Epi  
weaned off and Levophed gtt weaned off. SBP dec from 170 -110's.  
  
When AV was crossed - SVT changed to paced rhythm - 60bpm.  SBP in 100's. Levophed gtt restarted at 5mcg 
   
  Signed by: Ck Lazar MD  
 
 
 
Review of Systems: intubated/sedated Objective:  
 
Visit Vitals BP (!) 125/50 Pulse 79 Temp 99.2 °F (37.3 °C) Resp 21 Ht 5' 7\" (1.702 m) Wt 90.3 kg (199 lb) SpO2 97% BMI 31.17 kg/m² O2 Flow Rate (L/min): 10 l/min O2 Device: Endotracheal tube, Ventilator Temp (24hrs), Av.1 °F (37.8 °C), Min:99.2 °F (37.3 °C), Max:100.5 °F (38.1 °C) No intake/output data recorded.  1901 -  0700 In: 2798.3 [I.V.:348.3] Out: 2970 [Urine:2970] TELE: paced 60 General: in NAD. HEENT: Atraumatic. Orally intubated, OG, oral mucosa moist.  Anicteric sclerae. Neck : Supple Lungs: CTA bilaterally. No wheezing/rhonchi/rales. Heart: Regular rhythm, No JVD. Abdomen: Soft, non-distended, + Bowel sounds. Extremities: generalized upper and lower ext edema. Prevalon boots on. Neurologic: Grossly intact. Spont eye opening Psych: sedated Care Plan discussed with: 
  Comments Patient Family RN x Care Manager Consultant:  x Data Review: No lab exists for component: ITNL No results for input(s): CPK, CKMB, TROIQ in the last 72 hours. Recent Labs  
  21 
0316 21 
0430 21 
7469  138 135* K 4.2 4.2 4.5  
 108 107 CO2 26 26 22 BUN 57* 46* 38* CREA 1.44* 1.38* 1.40* * 123* 132* MG 2.5* 2.3  --   
ALB  --   --  2.2* WBC 6.3 6.6  --   
HGB 8.7* 8.8*  --   
HCT 27.9* 28.2*  --   
 240  -- No results for input(s): INR, PTP, APTT, INREXT, INREXT in the last 72 hours. Medications reviewed Current Facility-Administered Medications Medication Dose Route Frequency  furosemide (LASIX) injection 40 mg  40 mg IntraVENous BID  scopolamine (TRANSDERM-SCOP) 1 mg over 3 days 1 Patch  1 Patch TransDERmal Q72H  levothyroxine (SYNTHROID) tablet 50 mcg  50 mcg Per NG tube 6am  
 pantoprazole (PROTONIX) 40 mg in 0.9% sodium chloride 10 mL injection  40 mg IntraVENous DAILY  metroNIDAZOLE (FLAGYL) IVPB premix 500 mg  500 mg IntraVENous Q12H  
 albuterol-ipratropium (DUO-NEB) 2.5 MG-0.5 MG/3 ML  3 mL Nebulization Q6H RT  
 midazolam (VERSED) injection 1 mg  1 mg IntraVENous Q2H PRN  
 cefTRIAXone (ROCEPHIN) 2 g in sterile water (preservative free) 20 mL IV syringe  2 g IntraVENous Q24H  
 heparin (porcine) injection 5,000 Units  5,000 Units SubCUTAneous Q8H  
 fentaNYL citrate (PF) injection 50 mcg  50 mcg IntraVENous Q1H PRN  
 sodium chloride (NS) flush 5-40 mL  5-40 mL IntraVENous Q8H  
 sodium chloride (NS) flush 5-40 mL  5-40 mL IntraVENous PRN  
 acetaminophen (TYLENOL) tablet 650 mg  650 mg Oral Q6H PRN  polyethylene glycol (MIRALAX) packet 17 g  17 g Oral DAILY PRN  
 ondansetron (ZOFRAN) injection 4 mg  4 mg IntraVENous Q6H PRN Loraine Ayon NP

## 2021-04-28 NOTE — PROGRESS NOTES
RAMAN will be planned for Thursday 4/29 approx 12:30 pm. Dr. Surya Montgomery MD requests pt to remain intubated until after procedure for any further attempts at extubation.

## 2021-04-28 NOTE — PROGRESS NOTES
Assessment / Plan:   Remains intubated and sedated  RAMAN scheduled for tomorrow  If no source found on RAMAN will further eval gallbladder as potential source of sepsis    Abby Murillo MD  [facility]  [phone]        General Surgery Daily Progress Note      Patient: Nawaf Arguelles MRN: [id number]  SSN: [id number]    YOB: 1931  Age: 80 y.o. Sex: male          Subjective:   Patient remains critically ill    Current Facility-Administered Medications   Medication Dose Route Frequency    fentaNYL (PF) 1,500 mcg/30 mL (50 mcg/mL) infusion  0-50 mcg/hr IntraVENous TITRATE    adenosine (ADENOCARD) injection 6 mg  6 mg IntraVENous ONCE    furosemide (LASIX) injection 40 mg  40 mg IntraVENous BID    scopolamine (TRANSDERM-SCOP) 1 mg over 3 days 1 Patch  1 Patch TransDERmal Q72H    levothyroxine (SYNTHROID) tablet 50 mcg  50 mcg Per NG tube 6am    pantoprazole (PROTONIX) 40 mg in 0.9% sodium chloride 10 mL injection  40 mg IntraVENous DAILY    metroNIDAZOLE (FLAGYL) IVPB premix 500 mg  500 mg IntraVENous Q12H    albuterol-ipratropium (DUO-NEB) 2.5 MG-0.5 MG/3 ML  3 mL Nebulization Q6H RT    midazolam (VERSED) injection 1 mg  1 mg IntraVENous Q2H PRN    cefTRIAXone (ROCEPHIN) 2 g in sterile water (preservative free) 20 mL IV syringe  2 g IntraVENous Q24H    heparin (porcine) injection 5,000 Units  5,000 Units SubCUTAneous Q8H    fentaNYL citrate (PF) injection 50 mcg  50 mcg IntraVENous Q1H PRN    sodium chloride (NS) flush 5-40 mL  5-40 mL IntraVENous Q8H    sodium chloride (NS) flush 5-40 mL  5-40 mL IntraVENous PRN    acetaminophen (TYLENOL) tablet 650 mg  650 mg Oral Q6H PRN    polyethylene glycol (MIRALAX) packet 17 g  17 g Oral DAILY PRN    ondansetron (ZOFRAN) injection 4 mg  4 mg IntraVENous Q6H PRN        Objective:   No intake/output data recorded.   04/26 1901 - 04/28 0700  In: 2798.3 [I.V.:348.3]  Out: 2970 [Urine:2970]  Patient Vitals for the past 8 hrs:   BP Pulse Resp SpO2   04/28/21 1148 (!) 135/101 (!) 158     04/28/21 1042  88 30 93 %   04/28/21 0750  64 30 95 %   04/28/21 0701  76     04/28/21 0630 (!) 125/50      04/28/21 0600 133/64 79 21 97 %       Physical Exam:  General: Intubated and sedated  Neck:  Supple, symmetrical, trachea midline  Lungs: On vent, aerating well  Heart:  Regular rate and rhythm  Abdomen: Soft, non tender. Non distended  Extremities: Extremities normal, atraumatic, no cyanosis or edema.   Skin:  Skin color, texture, turgor normal. No rashes or lesions    Labs:   Recent Labs     04/28/21  0316   WBC 6.3   HGB 8.7*   HCT 27.9*        Recent Labs     04/28/21 0316      K 4.2      CO2 26   *   BUN 57*   CREA 1.44*   CA 8.2*   MG 2.5*       ·     Active Problems:    Hypertension (8/1/2013)      Hyperlipidemia (8/1/2013)      BPH (benign prostatic hyperplasia) (8/1/2013)      Hypoxia (4/21/2021)      Third degree heart block (HCC) (4/21/2021)      Bradycardia (4/21/2021)      Elevated troponin (4/21/2021)      BARAK (acute kidney injury) ([facility] (4/21/2021)      Cardiogenic shock (HCC) (4/21/2021)      Asystole ([facility] (4/21/2021)      Ventricular fibrillation ([facility] (4/21/2021)        Problem List Items Addressed This Visit     Third degree heart block ([facility] - Primary    Relevant Medications    metoprolol tartrate (LOPRESSOR) 25 mg tablet    Bradycardia    Elevated troponin    Relevant Medications    magnesium hydroxide (Diro Milk of Magnesia) 400 mg/5 mL suspension    pantoprazole (PROTONIX) 40 mg tablet    BARAK (acute kidney injury) ([facility]    Cardiogenic shock (HCC)      Other Visit Diagnoses     Heart block AV third degree (HCC)        Relevant Medications    metoprolol tartrate (LOPRESSOR) 25 mg tablet    Other Relevant Orders    CARDIAC PROCEDURE (Completed)    INVASIVE VASCULAR PROCEDURE (Completed)    Cardiac arrest (HCC)        Relevant Medications    metoprolol tartrate (LOPRESSOR) 25 mg tablet Bacteremia due to Streptococcus        Leukocytosis, unspecified type        Elevated liver enzymes        Relevant Medications    magnesium hydroxide (Dior Milk of Magnesia) 400 mg/5 mL suspension    pantoprazole (PROTONIX) 40 mg tablet    Goals of care, counseling/discussion        DNR (do not resuscitate) discussion        CHB (complete heart block) (HCC)        Relevant Medications    metoprolol tartrate (LOPRESSOR) 25 mg tablet    Other Relevant Orders    CARDIAC PROCEDURE    INVASIVE VASCULAR PROCEDURE

## 2021-04-28 NOTE — PROGRESS NOTES
Problem: Ventilator Management  Goal: *Adequate oxygenation and ventilation  Outcome: Progressing Towards Goal  Goal: *Patient maintains clear airway/free of aspiration  Outcome: Progressing Towards Goal  Goal: *Absence of infection signs and symptoms  Outcome: Progressing Towards Goal  Goal: *Normal spontaneous ventilation  Outcome: Progressing Towards Goal     Problem: Falls - Risk of  Goal: *Absence of Falls  Description: Document Mikel Fall Risk and appropriate interventions in the flowsheet. Outcome: Progressing Towards Goal  Note: Fall Risk Interventions:       Mentation Interventions: Bed/chair exit alarm, Door open when patient unattended    Medication Interventions: Bed/chair exit alarm    Elimination Interventions: Bed/chair exit alarm    History of Falls Interventions: Bed/chair exit alarm, Door open when patient unattended         Problem: Pressure Injury - Risk of  Goal: *Prevention of pressure injury  Description: Document Fabio Scale and appropriate interventions in the flowsheet. Outcome: Progressing Towards Goal  Note: Pressure Injury Interventions:  Sensory Interventions: Pressure redistribution bed/mattress (bed type)    Moisture Interventions: Internal/External urinary devices, Minimize layers    Activity Interventions: Pressure redistribution bed/mattress(bed type)    Mobility Interventions: Pressure redistribution bed/mattress (bed type), Turn and reposition approx.  every two hours(pillow and wedges)    Nutrition Interventions: Document food/fluid/supplement intake    Friction and Shear Interventions: Lift sheet

## 2021-04-28 NOTE — PROCEDURES
BRIEF PROCEDURE NOTE    Date of Procedure: 4/28/2021   Preoperative Diagnosis: Temp wire  Postoperative Diagnosis: Exchange temp wire     Procedure: Left heart cath, LV angiography, coronary angiography  Interventional Cardiologist: Victor Hugo Naidu MD  Assistant : none  Anesthesia: local + IV sedation  Estimated Blood Loss: Minimal  Findings:     Attempted R IJ access - ultrasound/fluroroscopic/micropuncture access. Difficult to obtain R IJ access  R EJ access obtained -  ultrasound/fluroroscopic/micropuncture access = 8 F sheath inserted and sutured in situ    Temp wire inserted/floated under fluroscopic guidance into RV - Rate 60 bpm.mA 3 - pt's intrinsic rate in 80's - SR    R FV temp wire inserted previously removed under fluroscopic guidance    Specimens Removed : None    Complications: None    Closure Device: none        See full cath note.     Complications: none    Victor Hugo Naidu MD

## 2021-04-28 NOTE — PROGRESS NOTES
1430- Bedside and Verbal shift change report given to Nikki Capps RN (oncoming nurse) by MARTINEZ Howell  (offgoing nurse). Report included the following information SBAR, Kardex, Intake/Output and Cardiac Rhythm NSR/paced. Assessment completed, see doc flowsheet. 1437- Fentanyl IV push given  1500- Cordis removed, no evidence of bleeding, all pulses palpable. 1600- Reassessment completed, see doc flowsheet. Patient turned and repositioned in bed. Mouth care and ETT care completed. 1800- Patient turned and repositioned in bed, mouth care and ETT care completed. 1900- Bedside and Verbal shift change report given to MARTINEZ Mccauley (oncoming nurse) by Nikki Capps RN (offgoing nurse). Report included the following information SBAR, Kardex, Intake/Output and Cardiac Rhythm NSR/paced.

## 2021-04-28 NOTE — PROGRESS NOTES
D/w Patient's daughter Pierce Snow ( Tel: [phone])  Pt having low grade fevers    Consent obtained to change temp wire/site  RIBA of procedure explained in detail. Daughter agrees to procedure. Consent obtained, Witnessed by [facility] from cath lab. Jason Lyn MD, [facility]

## 2021-04-29 NOTE — PROGRESS NOTES
1900: Bedside shift change report given to Parisa RN (oncoming nurse) by Sophie Corral RN (offgoing nurse). Report included the following information SBAR, Kardex, Procedure Summary, Intake/Output, Recent Results and Med Rec Status. Primary Nurse Lori Sweet RN and Sophie Corral, RN performed a dual skin assessment on this patient No impairment noted  Fabio score is 10    2000: Shift  Assessment completed, see flowsheet. Pt RASS +1 fent drip ordered and started see MAR.     2200: Pt restless, raising arms, coughing, HTN, pt appears to be in pain based on nonverbal pain score. 1mg versed given see MAR.     0000: Reassessment completed. Changes noted, see flow sheet. TF turned off for RAMAN in AM  Pts BP soft, MD notified 500mL NS bolus given see MAR.     0100: Pt having long pauses despite transvenous pacer being set to 60BMP. Attempted to call cardiology, unable to reach MD. Dr. Lux Capps called. Dopamine ordered and started. Dr. Jose Luz called, ordered to increase pacemaker milliamps to 5 from 3 and continue dopamine. 0200: pt restless, grimacing, eyes tearing, HR 130s, see MAR for medication titrations and PRN meds given. 0400: Reassessment completed. No changes from previous assessment     0600: see MAR for medication titrations and meds given. See flowsheet for assessments and reassessments, pt care/hygeine, I&Os, results for labs drawn. 0700: Bedside and Verbal shift change report given to [address] [place] [address] (oncoming nurse) by Marv Ramos RN (offgoing nurse). Report included the following information SBAR, Kardex and Recent Results.

## 2021-04-29 NOTE — CONSULTS
[address]     Name:  Antwon Madrid  MR#:  [id number]  :  1931  ACCOUNT #:  [de-identified]  DATE OF SERVICE:  2021    PRIMARY CARE PROVIDER:  Geoffrey Wagner MD    REASON FOR CONSULTATION:  This is a consult note for sepsis and question of biliary sepsis. HISTORY OF PRESENT ILLNESS:  The patient is an 43-year-old male who presented to [facility] with complete heart block, requiring temporary cutaneous pacing. He was found ultimately to have Strep sanguinis sepsis. He remains intubated and sedated. He has been weaned off of vasopressors, but continues to have low-grade temperatures. His workup reveals a distended gallbladder consistent with layering stones or sludge. He has mild gallbladder wall thickening. The CT scan resulted on 2021. Ultrasound of the abdomen reveals gallbladder wall thickening, pericholecystic fluid, but no finding of gallstones. The differential diagnosis also includes possible liver disease and hyperproteinemia. There is no evidence of biliary ductal dilatation. The gallbladder wall was approximately 6 mm with pericholecystic fluid. The patient did not have sonographic Maldonado's sign. Common bile duct was 5 mm in diameter. REVIEW OF SYSTEMS:  Unobtainable due to the patient being intubated and sedated. PAST MEDICAL HISTORY:  1. Hypertension. 2.  Arthritis. 3.  BPH. 4.  Aortic stenosis. 5.  History of throat cancer. 6.  Dyspnea on exertion. 7.  Bronchitis. 8.  Atrial fibrillation. 9.  Hyperlipidemia. 10.  Elevated PSA. 11.  Chronic kidney disease. PAST SURGICAL HISTORY:  1. Cataract removal.  2.  Lumbar laminectomy. PAST FAMILY HISTORY:  1.  Lung cancer. 2.  Stroke. 3.  Diabetes. 4.  Negative cardiac disease. SOCIAL HISTORY:  He is . He is a former smoker. He drinks occasionally. HOME MEDICATIONS:  1. Pantoprazole. 2.  Metoprolol. 3.  Levothyroxine. 4.  Gabapentin.   5. Aspirin. 6.  Acetaminophen. 7.  Clopidogrel. 8.  Amlodipine. 9.  Losartan. ALLERGIES:  NO KNOWN DRUG ALLERGIES. PHYSICAL EXAMINATION:  VITAL SIGNS:  Blood pressure is 108/55, pulse is 60, temperature is 100.2, height 5 feet 7 inches tall, weight 199 kg, oxygen saturation 90 percent, intubated with 100% of FiO2. GENERAL:  He is sedated on vent. HEENT:  Normocephalic, atraumatic. NECK:  Supple. Trachea is midline. CHEST:  Clear. Nonlabored breathing. HEART:  Rate is regular. No murmurs or rubs. He has a systolic ejection murmur. ABDOMEN:  Soft, nondistended. Did not appear to have surgical scar. EXTREMITIES:  No edema. SKIN:  No rash. MUSCULOSKELETAL:  No clubbing, cyanosis or edema. PSYCHIATRIC:  Unable to assess. NEUROLOGIC:  Unable to assess. LABORATORY DATA:  See HPI. The patient does not have a white blood cell count or leukocytosis. He does not have elevation in neutrophil count or absolute neutrophils. Chemistry, BUN is between 38 and 57, creatinine between 1.38 and 2.17. Total bilirubin 0.4. The patient has marginal elevation of AST between 30 and 38. He has alkaline phosphatase of 154. ASSESSMENT:  Sepsis with blood cultures positive for Streptococcus sanguinis. The patient also has an incidental finding of distended gallbladder, question of thickened gallbladder wall versus pericholecystic fluid and/or liver disease enhancement in the gallbladder wall and sludge, that appeared to be on his CT scan but was not noted on his ultrasound. At this juncture, the patient had a negative RAMAN. RAMAN will be repeated at some point if he continues to spike temperatures. If the heart source is ruled out for any source of vegetation or bacterial endocarditis, I think a reasonable next step would be to drain his gallbladder to see if that will help to rule out sepsis, but I intend to culture the bile to see if source of infection is there.   There is no plan for surgical intervention at this juncture. Total time of consultation including discussion of the patient with Dr. Cornelia Michaud, today 04/27/2021 and review of the imaging and patient evaluation was approximately one hour. It was a pleasure to participate in his case. The patient is critically ill and he is at high risk of decompensation. We will continue to follow.       Rochelle Leggett MD      [id number]  D:  04/29/2021 9:57  T:  04/29/2021 14:40  JOB #:  [id number]

## 2021-04-29 NOTE — PROGRESS NOTES
[facility] Progress Note           Daryle Po DO    [phone] Office  [phone]  Fax    2021      Assessment & Plan:   · Streptococcus sanguinous (viridans streptococci) isolated from /4 blood cultures. Concern for endocarditis however 2 separate RAMAN negative for vegetation or intracardiac abscess. Continue ceftriaxone. Repeat blood cultures sterile to date. · Fever. I suspect this was related to femoral transvenous pacer as fever has resolved since pacer site was moved to right EJ. Repeat blood cultures NGSF. UA bland CT C-A-P relatively unremarkable with exception of gallbladder. · Abnormal gallbladder on CT. Ultrasound reveals thickened gallbladder wall with pericholecystic fluid . With ongoing unexplained fevers and abnormal LFTs acalculous cholecystitis is in the differential.  General surgery following. Added anaerobic coverage with metronidazole  · Third-degree heart block. Temporary transvenous pacemaker emergently placed  and changed to right EJ 2020 due to ongoing fevers. No evidence of vegetation or intracardiac abscess on RAMAN x2. Fevers have resolved. Repeat blood cultures sterile ×3 sets. Will discuss timing of dual-chamber PPM placement with cardiology. · BARAK/CKD          Subjective: Intubated and sedated. Fever trending down    Objective:     Vitals:   Visit Vitals  BP (!) 147/75   Pulse (!) 106   Temp 98.3 °F (36.8 °C)   Resp 30   Ht 5' 7\" (1.702 m)   Wt 199 lb 1.2 oz (90.3 kg)   SpO2 95%   BMI 31.18 kg/m²        Tmax:  Temp (24hrs), Av.6 °F (37.6 °C), Min:98.3 °F (36.8 °C), Max:100.5 °F (38.1 °C)      Exam:   Patient is intubated:  no    Physical Examination:   General:   Sedated on vent   Head:  Normocephalic, atraumatic. Eyes:  Conjunctivae clear   Neck: Supple       Lungs:   No distress. Chest wall:     Heart:  Regular rate and rhythm.   /6 BRIAN   Abdomen:   Soft, non-tender, non-distended   Extremities: No edema. Skin:  No rash   Neurologic:  Unable to assess     Labs:        No lab exists for component: ITNL   No results for input(s): CPK, CKMB, TROIQ in the last 72 hours. Recent Labs     04/29/21  0237 04/28/21  0316 04/27/21  0430    138 138   K 3.9 4.2 4.2    107 108   CO2 26 26 26   BUN 55* 57* 46*   CREA 1.34* 1.44* 1.38*   * 129* 123*   MG 2.2 2.5* 2.3   ALB 2.1*  --   --    WBC 7.9 6.3 6.6   HGB 9.2* 8.7* 8.8*   HCT 29.2* 27.9* 28.2*    271 240     No results for input(s): INR, PTP, APTT, INREXT, INREXT in the last 72 hours.   Needs: urine analysis, urine sodium, protein and creatinine  Lab Results   Component Value Date/Time    Creatinine, urine 148.1 02/21/2014 10:54 AM         Cultures:     No results found for: SATYA  Lab Results   Component Value Date/Time    Culture result: NO GROWTH 2 DAYS 04/27/2021 10:26 AM    Culture result: NO GROWTH 4 DAYS 04/25/2021 09:09 AM    Culture result: NO GROWTH 5 DAYS 04/24/2021 08:20 AM       Radiology:     Medications       Current Facility-Administered Medications   Medication Dose Route Frequency Last Admin    sodium chloride 0.9 % bolus infusion 500 mL  500 mL IntraVENous Q1H  mL at 04/29/21 0056    DOPamine (INTROPIN) 800 mg in dextrose 5% 250 mL infusion  0-20 mcg/kg/min IntraVENous TITRATE 4 mcg/kg/min at 04/29/21 0641    fentaNYL (PF) 1,500 mcg/30 mL (50 mcg/mL) infusion  0-200 mcg/hr IntraVENous TITRATE 75 mcg/hr at 04/29/21 1222    midazolam (VERSED) injection 2 mg  2 mg IntraVENous PRN 2 mg at 04/29/21 1300    fentaNYL citrate (PF) injection 100 mcg  100 mcg IntraVENous ONCE Stopped at 04/29/21 1301    furosemide (LASIX) injection 40 mg  40 mg IntraVENous BID 40 mg at 04/29/21 0958    scopolamine (TRANSDERM-SCOP) 1 mg over 3 days 1 Patch  1 Patch TransDERmal Q72H 1 Patch at 04/27/21 1725    levothyroxine (SYNTHROID) tablet 50 mcg  50 mcg Per NG tube 6am 50 mcg at 04/29/21 0550    pantoprazole (PROTONIX) 40 mg in 0.9% sodium chloride 10 mL injection  40 mg IntraVENous DAILY 40 mg at 04/29/21 0936    metroNIDAZOLE (FLAGYL) IVPB premix 500 mg  500 mg IntraVENous Q12H 500 mg at 04/29/21 1344    albuterol-ipratropium (DUO-NEB) 2.5 MG-0.5 MG/3 ML  3 mL Nebulization Q6H RT 3 mL at 04/29/21 0727    midazolam (VERSED) injection 1 mg  1 mg IntraVENous Q2H PRN 1 mg at 04/29/21 1219    cefTRIAXone (ROCEPHIN) 2 g in sterile water (preservative free) 20 mL IV syringe  2 g IntraVENous Q24H 2 g at 04/29/21 0936    heparin (porcine) injection 5,000 Units  5,000 Units SubCUTAneous Q8H 5,000 Units at 04/29/21 1344    fentaNYL citrate (PF) injection 50 mcg  50 mcg IntraVENous Q1H PRN 50 mcg at 04/29/21 1221    sodium chloride (NS) flush 5-40 mL  5-40 mL IntraVENous Q8H 10 mL at 04/29/21 0550    sodium chloride (NS) flush 5-40 mL  5-40 mL IntraVENous PRN 10 mL at 04/28/21 2201    acetaminophen (TYLENOL) tablet 650 mg  650 mg Oral Q6H  mg at 04/28/21 1823    polyethylene glycol (MIRALAX) packet 17 g  17 g Oral DAILY PRN      ondansetron (ZOFRAN) injection 4 mg  4 mg IntraVENous Q6H PRN             Case discussed with: Nursing, respiratory, and cardiology      Daryle Po, DO

## 2021-04-29 NOTE — PROGRESS NOTES
Reported bradycardia to HR 30  Will start dopamine for HR  Nurses to contact cardiology re: pacemaker.

## 2021-04-29 NOTE — PROGRESS NOTES
[facility] Medical Progress Note NAME: Nilay Sawant :  1931 MRM:  [id number] Date/Time of service 2021  2:02 PM   
 
  
Assessment and Plan:  
 
Acute respiratory failure - POA post arrest. Remains intubated. Vent management per intensivist.  
  
Third degree heart block / Ventricular fibrillation / post Cardiopulmonary arrest / Elevated troponin / Hx Hypertension - POA, likely due to endocarditis. Emergently had pacer placed and  had it switched out from femoral to subclavian on . Chart note suggests bradycardia overnight, but vital sign charting does not show this. Cardiology managing. No abscess seen on RAMAN , CTA chest on  does not comment on any heart abscess, but still high suspicion. Cath unremarkable. Getting repeat RAMAN. Family is considering compassionate withdrawal of care, and I concur that is a reasonable option. 
  
Streptococcus Bacteremia - POA, due to endocarditis, vs gallbladder. Consulted ID who are managing antibiotics. They consulted general surgery. Continue high dose ceftriaxone and flagyl. Repeat blood cx NGTD Septic Shock / Fever - Likely cardiogenic and septic. Worsening shock. Now on dopamine. May need levophed again. Recurrent fever. Anemia - POA and now moderate and dropping slowly. Check serologies. 
  
BARAK / CKD 3 - POA, due to hypoperfusion, now stable after IVF. Avoid nephrotoxins BPH (benign prostatic hyperplasia) - Monitor for obstruction Gallbladder thickening - Outpatient follow up Hyperlipidemia - Holding meds Subjective: Chief Complaint:  Cannot obtain, fever and hypotension overnight. ROS: 
(bold if positive, if negative) Not Tolerating PT  Not Tolerating Diet Objective:  
 
Last 24hrs VS reviewed since prior progress note. Most recent are: 
 
Visit Vitals BP (!) 114/55 Pulse 87 Temp 99.1 °F (37.3 °C) Resp 23 Ht 5' 7\" (1.702 m) Wt 90.3 kg (199 lb) SpO2 95% BMI 31.17 kg/m² SpO2 Readings from Last 6 Encounters:  
04/29/21 95% 07/10/19 95% 11/29/18 93% 11/20/18 96% 08/03/18 97% 08/01/18 98% O2 Flow Rate (L/min): 10 l/min Intake/Output Summary (Last 24 hours) at 4/29/2021 0800 Last data filed at 4/29/2021 0600 Gross per 24 hour Intake 126.98 ml Output 2455 ml Net -2328.02 ml Physical Exam: 
 
Gen:  Obese, in no acute distress HEENT:  Pink conjunctivae, PERRL, hearing intact to voice, ET in place Neck:  Supple, without masses, thyroid non-tender Resp:  No accessory muscle use, bilateral breath sounds without wheezes rales or rhonchi 
Card:  No murmurs, normal S1, S2 without thrills, bruits or peripheral edema Abd:  Soft, non-tender, non-distended, reduced bowel sounds are present, no mass Lymph:  No cervical or inguinal adenopathy Musc:  No cyanosis or clubbing Skin:  No rashes or ulcers, skin turgor is good Neuro:  Cranial nerves are grossly intact, general motor weakness, does not follow commands appropriately Psych:  sedated Telemetry reviewed:   paced 
__________________________________________________________________ Medications Reviewed: (see below) Medications:  
 
Current Facility-Administered Medications Medication Dose Route Frequency  sodium chloride 0.9 % bolus infusion 500 mL  500 mL IntraVENous Q1H PRN  
 DOPamine (INTROPIN) 800 mg in dextrose 5% 250 mL infusion  0-20 mcg/kg/min IntraVENous TITRATE  fentaNYL (PF) 1,500 mcg/30 mL (50 mcg/mL) infusion  0-50 mcg/hr IntraVENous TITRATE  furosemide (LASIX) injection 40 mg  40 mg IntraVENous BID  scopolamine (TRANSDERM-SCOP) 1 mg over 3 days 1 Patch  1 Patch TransDERmal Q72H  levothyroxine (SYNTHROID) tablet 50 mcg  50 mcg Per NG tube 6am  
 pantoprazole (PROTONIX) 40 mg in 0.9% sodium chloride 10 mL injection  40 mg IntraVENous DAILY  metroNIDAZOLE (FLAGYL) IVPB premix 500 mg  500 mg IntraVENous Q12H  
 albuterol-ipratropium (DUO-NEB) 2.5 MG-0.5 MG/3 ML  3 mL Nebulization Q6H RT  
 midazolam (VERSED) injection 1 mg  1 mg IntraVENous Q2H PRN  
 cefTRIAXone (ROCEPHIN) 2 g in sterile water (preservative free) 20 mL IV syringe  2 g IntraVENous Q24H  
 heparin (porcine) injection 5,000 Units  5,000 Units SubCUTAneous Q8H  
 fentaNYL citrate (PF) injection 50 mcg  50 mcg IntraVENous Q1H PRN  
 sodium chloride (NS) flush 5-40 mL  5-40 mL IntraVENous Q8H  
 sodium chloride (NS) flush 5-40 mL  5-40 mL IntraVENous PRN  
 acetaminophen (TYLENOL) tablet 650 mg  650 mg Oral Q6H PRN  polyethylene glycol (MIRALAX) packet 17 g  17 g Oral DAILY PRN  
 ondansetron (ZOFRAN) injection 4 mg  4 mg IntraVENous Q6H PRN Lab Data Reviewed: (see below) Lab Review:  
 
Recent Labs  
  04/29/21 
0237 04/28/21 
0316 04/27/21 
0430 WBC 7.9 6.3 6.6 HGB 9.2* 8.7* 8.8* HCT 29.2* 27.9* 28.2*  
 271 240 Recent Labs  
  04/29/21 
0237 04/28/21 
0316 04/27/21 
0430  138 138  
K 3.9 4.2 4.2  107 108 CO2 26 26 26 * 129* 123* BUN 55* 57* 46* CREA 1.34* 1.44* 1.38* CA 8.4* 8.2* 8.1*  
MG 2.2 2.5* 2.3 ALB 2.1*  --   --   
TBILI 0.4  --   --   
*  --   --   
 
Lab Results Component Value Date/Time Glucose (POC) 116 (H) 08/05/2014 08:59 PM  
 
Recent Labs  
  04/29/21 
0531 04/28/21 
0356 PH 7.43 7.40 PCO2 41 40 PO2 68* 120* HCO3 26 24 FIO2 70 70% No results for input(s): INR, INREXT, INREXT in the last 72 hours. All Micro Results Procedure Component Value Units Date/Time CULTURE, BLOOD, PAIRED [[id number]] Collected: 04/27/21 1026 Order Status: Completed Specimen: Blood Updated: 04/29/21 6848 Special Requests: NO SPECIAL REQUESTS Culture result: NO GROWTH 2 DAYS     
 CULTURE, BLOOD, PAIRED [[id number]] Collected: 04/25/21 9785 Order Status: Completed Specimen: Blood Updated: 04/29/21 9221 Special Requests: NO SPECIAL REQUESTS Culture result: NO GROWTH 4 DAYS CULTURE, BLOOD, PAIRED [[id number]] Collected: 04/24/21 0820 Order Status: Completed Specimen: Blood Updated: 04/29/21 3977 Special Requests: NO SPECIAL REQUESTS Culture result: NO GROWTH 5 DAYS     
 CULTURE, BLOOD [[id number]]  (Abnormal)  (Susceptibility) Collected: 04/22/21 0451 Order Status: Completed Specimen: Blood Updated: 04/25/21 3921 Special Requests: NO SPECIAL REQUESTS Culture result: STREPTOCOCCUS SANGUINIS GROWING IN BOTH BOTTLES DRAWN (SITE = L FOREARM) CULTURE, BLOOD [[id number]]  (Abnormal)  (Susceptibility) Collected: 04/22/21 0451 Order Status: Completed Specimen: Blood Updated: 04/25/21 0801 Special Requests: NO SPECIAL REQUESTS Culture result: STREPTOCOCCUS SANGUINIS GROWING IN BOTH BOTTLES DRAWN (SITE = L WRIST) CULTURE, BLOOD, PAIRED [[id number]] Collected: 04/24/21 0730 Order Status: Canceled Specimen: Blood MRSA CULTURE [[id number]] Collected: 04/22/21 0100 Order Status: Completed Specimen: Nasal from Nares Updated: 04/23/21 4745 Special Requests: NO SPECIAL REQUESTS Culture result: MRSA NOT PRESENT Screening of patient nares for MRSA is for surveillance purposes and, if positive, to facilitate isolation considerations in high risk settings. It is not intended for automatic decolonization interventions per se as regimens are not sufficiently effective to warrant routine use. CULTURE, BLOOD, PAIRED [[id number]] Order Status: Canceled Specimen: Blood MICRO TRACKING [[id number]] Collected: 04/22/21 0451 Order Status: Completed Updated: 04/23/21 2181 MICRO TRACKING [[id number]] Collected: 04/22/21 0451 Order Status: Completed Updated: 04/23/21 3443 CULTURE, RESPIRATORY/SPUTUM/BRONCH Jakekamilla Manrique [[id number]] Collected: 04/23/21 4565 Order Status: Canceled Specimen: Sputum,ET Suction MICRO TRACKING [[id number]] Collected: 04/22/21 0451  Order Status: Completed Updated: 04/23/21 5286 MICRO TRACKING [[id number]] Collected: 04/22/21 0451 Order Status: Completed Updated: 04/22/21 2306 Other pertinent lab: none Total time spent with patient: 30 Minutes I personally reviewed chart, notes, data and current medications in the medical record. I have personally examined and treated the patient at bedside during this period. Care Plan discussed with: Patient, Nursing Staff, Consultant/Specialist and >50% of time spent in counseling and coordination of care Discussed:  Care Plan and D/C Planning Prophylaxis:  H2B/PPI Disposition:  [facility] 
        
___________________________________________________ Attending Physician: Umm Curtis MD

## 2021-04-29 NOTE — PROGRESS NOTES
Comprehensive Nutrition Assessment    Type and Reason for Visit: Reassess    Nutrition Recommendations/Plan:   Resume Vital HP @50mL, once cleared by MD after procedure, water flush of 50mL q8H  · Keep HOB >30 degrees     TF at goal: 1200mL of tf, 1200 kcal, 105g Protein, 1200mL of free water from tf & flushes [16.5 kcal/kg & 1.15g Pro/kg]     Nutrition Assessment:      4/29: Follow up. TF held today for RAMAN. Pt was tolerating at goal prior. Recommend restarting at goal. Per chart, family considering comfort measures. Will monitor goals of care. Labs- Cr 1.34, Hgb 9.2, --129-123. Meds- dopamine @3mcg/kg/min, Versed, fentanyl, lasix, synthroid, flagyl, Protonix. 4/26: pt tolerating TF & at goal over the weekend. Currently TF on hold for RAMAN. Expect to be able to resume TF at previous rate once finished. Labs ok, BUN/Creat improving. No new weights. 323: 80year old male admitted for Third degree heart block ([facility] [address] [address] [I44.2], Bradycardia [R00.1], Elevated troponin [R77.8] and BARAK (acute kidney injury) ([facility] [address] [N17.9]. Pt is intubated following cardiac surgery. RD visits room, pt is alert with daughter at bedside. Pt responded to disagree with his estimated weight of 200# on admission. Review of wt hx shows his usual was indeed below 200# a few years ago. Malnutrition Assessment:  Malnutrition Status:  No malnutrition    Nutrition focused physical exam finds: large abdomen, semi-soft, dry skin, appears stated age, mild generalized edema, no significant signs of malnutrition. Daughter at bedside states, he very much likes to eat & has always had a great appetite. Estimated Daily Nutrient Needs:  Energy (kcal): 2268(PAL 1890 x 1.2); Weight Used for Energy Requirements: Current  Protein (g): 100 - 120g (1.5-1.8g/kg of IBW);  Weight Used for Protein Requirements: Ideal(67.3 kg)  Fluid (ml/day): 2268; Method Used for Fluid Requirements: 1 ml/kcal    Nutrition Related Findings:  last BM 4/26; 2+ pitting LE, trace UE edema      Vent Measures: 10.6 l/min    Temp (24hrs), Av.5 °F (37.5 °C), Min:98.3 °F (36.8 °C), Max:100.5 °F (38.1 °C)    Wounds:  Small surgical   Several bruises from IV, no skin breakdown noted     Current Nutrition Therapies:  DIET TUBE FEEDING Vital HP @20mL, increase by 10mL Q8H to goal of 50mL/h, 50mL water flush Q8H  DIET NPO Except Meds    Anthropometric Measures:  · Height:  5' 7\" (170.2 cm)  · Current Body Wt:  90.7 kg (199 lb 15.3 oz)   · Admission Body Wt:       · Usual Body Wt:  86.2 kg (190 lb)     · Ideal Body Wt:  148 lbs:  135.1 %   Body mass index is 31.18 kg/m². · BMI Category:  Obese class 1 (BMI 30.0-34. 9)       Last 3 Recorded Weights in this Encounter    21 1052 21 1255 21 1345   Weight: 90.7 kg (199 lb 15.3 oz) 90.3 kg (199 lb) 90.3 kg (199 lb 1.2 oz)     Wt Readings from Last 10 Encounters:   21 90.3 kg (199 lb 1.2 oz)   07/10/19 86.2 kg (190 lb)   18 83.5 kg (184 lb)   18 84.8 kg (187 lb)   18 84.8 kg (187 lb)   18 85.7 kg (189 lb)   18 83.9 kg (185 lb)   18 83.9 kg (185 lb)   18 83.9 kg (185 lb)   18 84.8 kg (187 lb)       Nutrition Diagnosis:   · Inadequate protein-energy intake related to catabolic illness, cardiac dysfunction as evidenced by NPO or clear liquid status due to medical condition, intubation    : Nutrition dx improved- TF at goal.    Nutrition Interventions:   Food and/or Nutrient Delivery: Continue tube feeding  Nutrition Education and Counseling: Education initiated  Coordination of Nutrition Care: Continue to monitor while inpatient, Interdisciplinary rounds    Goals:   Tolerance of 80% of RMR via EN over the next 3-4 days       Nutrition Monitoring and Evaluation:   Behavioral-Environmental Outcomes: None identified  Food/Nutrient Intake Outcomes: Enteral nutrition intake/tolerance, IVF intake  Physical Signs/Symptoms Outcomes: Biochemical data, GI status, Weight    Discharge Planning:     Too soon to determine       Electronically signed by Ranulfo Isidro RDN on 4/29/2021   Contact via office [phone]

## 2021-04-29 NOTE — PROGRESS NOTES
Chart accessed to assist primary RN , Ventura Lantigua in assistance with bedside RAMAN. Per Dr. Candis Figueroa, Cardiology, asked this RN to pull several 2 mg of Versed pushes for RAMAN procedure. RN pulled several from Pyxis and gave the 2 mg of Versed 2x under the existing order of 1 mg Versed. (No waste after being given in full amount). New order placed to scan properly. Discussed with manager Shahla Vera. All other meds for sedation wasted properly in the system and under the correct order. Total of 5 mg of versed given.

## 2021-04-29 NOTE — PROGRESS NOTES
Cardiology Progress Note       ICU 
NAME:  Noland Olszewski :   1931 MRN:   [id number] Critically ill Assessment/Plan: 1. Strep sanguinous bacteria: RAMAN with no clear abcess or endocarditis. CT without abcess. Still have high suspicion for paravalvular abscess. Plan for repeat ARMAN today with Indira Werner. D/W dtr . ABX  Per ID. Will repeat RAMAN Thursday. 2. 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 Wire replaced ,  
3. Acute resp failure: vent mgt per intensivist. Failed SBT ,    
 
 
  
 
Subjective:  
Cardiac ROS: intubated/sedated Pacer lost capture overnight BP labile, dopamine started Previous Cardiac Eval 
21 ECHO RAMAN W OR WO CONTRAST 2021 Narrative · LV: Estimated LVEF is 55 - 60%. Normal cavity size, wall thickness and  
systolic function (ejection fraction normal). Wall motion: normal. 
· Aortic Valve: There is severe noncoronary leaflet calcification. There  
is moderate aortic stenosis. Trace aortic valve regurgitation. There is no  
obvious vegetation on the aortic valve, however it is difficult to rule  
out endocarditis on the severely calcified non-coronary cusp. There is  
slight thickening of the aortic root, however no abscess is seen. · There is no obvious vegetation on the aortic valve, however it is  
difficult to rule out endocarditis on the severely calcified non-coronary  
cusp. Will review images with my colleagues. Signed by: Terrie Alvarez MD  
 
21 CARDIAC PROCEDURE 2021 Narrative R CFA - micropuncture; ultrasound, fluoroscopic guided access - 6 F sheath 
R FV -  micropuncture; ultrasound, fluoroscopic guided access; 9 F cordis 
  
5 F balloon tip/Temp pacer wire inserted - 60 bpm/5 mA  
  
  
L Main: Short; Nml 
  
LAD: Med; MLI; D1 - MLI 
  
LCflex: Med; MLI 
  
RCA: Med Dominant; Mid 50%;  Distal stent patent; PLB - TAM; PDA - small -  
ostial 80% 
  
LVEDP: 26 mm Hg 
  LVEF: Hand injection / No well visualised 
  
Straight wire used to cross valve - AV - mod stenosis - Peak to peak  
gradient 30 mm Hg PCI: none 
  
  
Specimens Removed : None 
  
Complications: None 
  
Closure Device: R CFA - manual 
  
R FV - Cordis sutured in situ 
  
Pt was in SVT - prob sinus tach vs aflutter with 2:1 block  - 130bpm - Epi  
weaned off and Levophed gtt weaned off. SBP dec from 170 -110's.  
  
When AV was crossed - SVT changed to paced rhythm - 60bpm.  SBP in 100's. Levophed gtt restarted at 5mcg 
   
  Signed by: Maureen York MD  
 
 
 
Review of Systems: intubated/sedated Objective:  
 
Visit Vitals BP (!) 111/53 Pulse 85 Temp 99.1 °F (37.3 °C) Resp 20 Ht 5' 7\" (1.702 m) Wt 90.3 kg (199 lb) SpO2 94% BMI 31.17 kg/m² O2 Flow Rate (L/min): 10 l/min O2 Device: Ventilator Temp (24hrs), Av.7 °F (37.6 °C), Min:98.2 °F (36.8 °C), Max:100.5 °F (38.1 °C) No intake/output data recorded.  1901 -  0700 In: 947 [I.V.:127] Out: 3725 [EFRNQ:5434] TELE: SR 90 General: in NAD. HEENT: Atraumatic. Orally intubated, OG, oral mucosa moist.  Anicteric sclerae. Neck : R EJ pacer wire Lungs: CTA bilaterally. No wheezing/rhonchi/rales. Heart: Regular rhythm, No JVD. Abdomen: Soft, non-distended, + Bowel sounds. Extremities: generalized upper and lower ext edema. Prevalon boots on. Neurologic: Grossly intact. Spont eye opening Psych: sedated Care Plan discussed with: 
  Comments Patient Family  x dtr  RN x Care Manager Consultant:  mundo Data Review: No lab exists for component: ITNL No results for input(s): CPK, CKMB, TROIQ in the last 72 hours. Recent Labs  
  21 
0237 21 
0316 21 
0430  138 138  
K 3.9 4.2 4.2  107 108 CO2 26 26 26 BUN 55* 57* 46* CREA 1.34* 1.44* 1.38* * 129* 123* MG 2.2 2.5* 2.3 ALB 2.1*  --   --   
WBC 7.9 6.3 6.6 HGB 9.2* 8.7* 8.8* HCT 29.2* 27.9* 28.2*  
 271 240 No results for input(s): INR, PTP, APTT, INREXT, INREXT in the last 72 hours. Medications reviewed Current Facility-Administered Medications Medication Dose Route Frequency  sodium chloride 0.9 % bolus infusion 500 mL  500 mL IntraVENous Q1H PRN  
 DOPamine (INTROPIN) 800 mg in dextrose 5% 250 mL infusion  0-20 mcg/kg/min IntraVENous TITRATE  fentaNYL (PF) 1,500 mcg/30 mL (50 mcg/mL) infusion  0-50 mcg/hr IntraVENous TITRATE  furosemide (LASIX) injection 40 mg  40 mg IntraVENous BID  scopolamine (TRANSDERM-SCOP) 1 mg over 3 days 1 Patch  1 Patch TransDERmal Q72H  levothyroxine (SYNTHROID) tablet 50 mcg  50 mcg Per NG tube 6am  
 pantoprazole (PROTONIX) 40 mg in 0.9% sodium chloride 10 mL injection  40 mg IntraVENous DAILY  metroNIDAZOLE (FLAGYL) IVPB premix 500 mg  500 mg IntraVENous Q12H  
 albuterol-ipratropium (DUO-NEB) 2.5 MG-0.5 MG/3 ML  3 mL Nebulization Q6H RT  
 midazolam (VERSED) injection 1 mg  1 mg IntraVENous Q2H PRN  
 cefTRIAXone (ROCEPHIN) 2 g in sterile water (preservative free) 20 mL IV syringe  2 g IntraVENous Q24H  
 heparin (porcine) injection 5,000 Units  5,000 Units SubCUTAneous Q8H  
 fentaNYL citrate (PF) injection 50 mcg  50 mcg IntraVENous Q1H PRN  
 sodium chloride (NS) flush 5-40 mL  5-40 mL IntraVENous Q8H  
 sodium chloride (NS) flush 5-40 mL  5-40 mL IntraVENous PRN  
 acetaminophen (TYLENOL) tablet 650 mg  650 mg Oral Q6H PRN  polyethylene glycol (MIRALAX) packet 17 g  17 g Oral DAILY PRN  
 ondansetron (ZOFRAN) injection 4 mg  4 mg IntraVENous Q6H PRN López Pereira NP

## 2021-04-29 NOTE — PROGRESS NOTES
Problem: Ventilator Management  Goal: *Adequate oxygenation and ventilation  Outcome: Progressing Towards Goal  Goal: *Patient maintains clear airway/free of aspiration  Outcome: Progressing Towards Goal  Goal: *Absence of infection signs and symptoms  Outcome: Progressing Towards Goal  Goal: *Normal spontaneous ventilation  Outcome: Progressing Towards Goal     Problem: Falls - Risk of  Goal: *Absence of Falls  Description: Document Mikel Fall Risk and appropriate interventions in the flowsheet. Outcome: Progressing Towards Goal  Note: Fall Risk Interventions:       Mentation Interventions: Bed/chair exit alarm, Reorient patient, More frequent rounding, Increase mobility    Medication Interventions: Bed/chair exit alarm, Evaluate medications/consider consulting pharmacy    Elimination Interventions: Bed/chair exit alarm, Call light in reach    History of Falls Interventions: Bed/chair exit alarm, Evaluate medications/consider consulting pharmacy, Investigate reason for fall         Problem: Pressure Injury - Risk of  Goal: *Prevention of pressure injury  Description: Document Fabio Scale and appropriate interventions in the flowsheet.   Outcome: Progressing Towards Goal  Note: Pressure Injury Interventions:  Sensory Interventions: Assess changes in LOC, Assess need for specialty bed, Discuss PT/OT consult with provider, Float heels, Keep linens dry and wrinkle-free, Maintain/enhance activity level, Minimize linen layers, Pad between skin to skin, Monitor skin under medical devices    Moisture Interventions: Absorbent underpads, Assess need for specialty bed, Contain wound drainage, Internal/External urinary devices, Limit adult briefs, Minimize layers    Activity Interventions: Assess need for specialty bed, Pressure redistribution bed/mattress(bed type), PT/OT evaluation    Mobility Interventions: Assess need for specialty bed, Float heels, HOB 30 degrees or less, Pressure redistribution bed/mattress (bed type), Suspension boots, Turn and reposition approx.  every two hours(pillow and wedges)    Nutrition Interventions: Document food/fluid/supplement intake, Discuss nutritional consult with provider    Friction and Shear Interventions: Apply protective barrier, creams and emollients, Feet elevated on foot rest, HOB 30 degrees or less, Lift team/patient mobility team, Minimize layers, Sit at 90-degree angle

## 2021-04-29 NOTE — PROCEDURES
RAMAN prelim findings:    No intracardiac vegetations. No intracardiac abscess  No vegetations on the body of temp pacer. Tip not well seen. Calcified aortic valve  Mild AR, MR  LVH with normal LV fx. Normal RV fx    Full report in cards tab    Daughter notified. Cody Bush MD, [facility]

## 2021-04-29 NOTE — PROGRESS NOTES
Assessment / Plan:   Sepsis, Strep sanguinous bacteremia. RAMAN today  Following    Tyra Pimentel MD  [address] [facility]  [phone]        General Surgery Daily Progress Note      Patient: Amy Nino MRN: [id number]  SSN: [id number]    YOB: 1931  Age: 80 y.o. Sex: male          Subjective:   Patient remains critically ill. No new events overnight.      Current Facility-Administered Medications   Medication Dose Route Frequency    sodium chloride 0.9 % bolus infusion 500 mL  500 mL IntraVENous Q1H PRN    DOPamine (INTROPIN) 800 mg in dextrose 5% 250 mL infusion  0-20 mcg/kg/min IntraVENous TITRATE    fentaNYL (PF) 1,500 mcg/30 mL (50 mcg/mL) infusion  0-200 mcg/hr IntraVENous TITRATE    furosemide (LASIX) injection 40 mg  40 mg IntraVENous BID    scopolamine (TRANSDERM-SCOP) 1 mg over 3 days 1 Patch  1 Patch TransDERmal Q72H    levothyroxine (SYNTHROID) tablet 50 mcg  50 mcg Per NG tube 6am    pantoprazole (PROTONIX) 40 mg in 0.9% sodium chloride 10 mL injection  40 mg IntraVENous DAILY    metroNIDAZOLE (FLAGYL) IVPB premix 500 mg  500 mg IntraVENous Q12H    albuterol-ipratropium (DUO-NEB) 2.5 MG-0.5 MG/3 ML  3 mL Nebulization Q6H RT    midazolam (VERSED) injection 1 mg  1 mg IntraVENous Q2H PRN    cefTRIAXone (ROCEPHIN) 2 g in sterile water (preservative free) 20 mL IV syringe  2 g IntraVENous Q24H    heparin (porcine) injection 5,000 Units  5,000 Units SubCUTAneous Q8H    fentaNYL citrate (PF) injection 50 mcg  50 mcg IntraVENous Q1H PRN    sodium chloride (NS) flush 5-40 mL  5-40 mL IntraVENous Q8H    sodium chloride (NS) flush 5-40 mL  5-40 mL IntraVENous PRN    acetaminophen (TYLENOL) tablet 650 mg  650 mg Oral Q6H PRN    polyethylene glycol (MIRALAX) packet 17 g  17 g Oral DAILY PRN    ondansetron (ZOFRAN) injection 4 mg  4 mg IntraVENous Q6H PRN        Objective:   04/29 0701 - 04/29 1900  In: 15.4 [I.V.:15.4]  Out: 150 [Urine:150]  04/27 1901 - 04/29 0700  In: 947 [I.V.:127]  Out: 3725 [[facility]:6754]  Patient Vitals for the past 8 hrs:   BP Temp Pulse Resp SpO2   04/29/21 0915 (!) 118/58  89 18 93 %   04/29/21 0900 (!) 110/54  85 21 91 %   04/29/21 0830 (!) 167/73  (!) 102 22 92 %   04/29/21 0815 126/61  97 (!) 31 93 %   04/29/21 0805   93 20 94 %   04/29/21 0800 (!) 118/56 98.6 °F (37 °C) 94 27 94 %   04/29/21 0745 (!) 122/56       04/29/21 0730 126/61  90 20 97 %   04/29/21 0715 (!) 122/59  89 26 95 %   04/29/21 0705   88     04/29/21 0700 (!) 114/55  87 23 95 %   04/29/21 0645 (!) 111/53  85 20 94 %   04/29/21 0630 (!) 95/47  86 20 94 %   04/29/21 0615 (!) 104/50  93 24 94 %   04/29/21 0600 (!) 111/52  98 23 95 %   04/29/21 0545 138/65  (!) 103 24 96 %   04/29/21 0530 (!) 127/55  88 20 95 %   04/29/21 0515 (!) 82/41  74 20 94 %   04/29/21 0500 (!) 81/44  79 20 95 %   04/29/21 0445 (!) 95/47  84 23 94 %   04/29/21 0430 (!) 113/52  92 21 96 %   04/29/21 0421   94 20 95 %   04/29/21 0415 (!) 89/47  88 (!) 53 95 %   04/29/21 0400 (!) 89/46 99.1 °F (37.3 °C) 93 (!) 49 92 %   04/29/21 0345 (!) 155/74  (!) 125 27 93 %   04/29/21 0330 (!) 129/57  (!) 101 24 95 %   04/29/21 0315 (!) 91/43  83 27 93 %   04/29/21 0300 (!) 90/44  88 26 94 %   04/29/21 0245 (!) 119/51  97 22 94 %   04/29/21 0230 (!) 111/52  95 29 93 %   04/29/21 0215 (!) 116/54  99 27 90 %   04/29/21 0200 (!) 169/90  (!) 129 26 90 %   04/29/21 0145 135/64  (!) 102 20 92 %   04/29/21 0140     92 %       Physical Exam:  General: Intubated and sedated  Neck:  Supple, symmetrical, trachea midline  Lungs: On vent, aerating well  Heart:  Regular rate and rhythm  Abdomen: Soft, non tender. Non distended  Extremities: Extremities normal, atraumatic, no cyanosis or edema.   Skin:  Skin color, texture, turgor normal. No rashes or lesions    Labs:   Recent Labs     04/29/21 0237   WBC 7.9   HGB 9.2*   HCT 29.2*        Recent Labs     04/29/21 0237      K 3.9    CO2 26   *   BUN 55*   CREA 1.34*   CA 8.4*   MG 2.2   ALB 2.1*   TBILI 0.4   *       ·     Active Problems:    Hypertension (8/1/2013)      Hyperlipidemia (8/1/2013)      BPH (benign prostatic hyperplasia) (8/1/2013)      Hypoxia (4/21/2021)      Third degree heart block (HCC) (4/21/2021)      Bradycardia (4/21/2021)      Elevated troponin (4/21/2021)      BARAK (acute kidney injury) ([address] (4/21/2021)      Cardiogenic shock (HCC) (4/21/2021)      Asystole ([address] (4/21/2021)      Ventricular fibrillation ([address] (4/21/2021)        Problem List Items Addressed This Visit        Circulatory    Third degree heart block ([address] - Primary    Relevant Medications    metoprolol tartrate (LOPRESSOR) 25 mg tablet    Cardiogenic shock (HCC)       Urinary    BARAK (acute kidney injury) ([address]       Other    Bradycardia    Elevated troponin    Relevant Medications    magnesium hydroxide (Dior Milk of Magnesia) 400 mg/5 mL suspension    pantoprazole (PROTONIX) 40 mg tablet      Other Visit Diagnoses     Heart block AV third degree (HCC)        Relevant Medications    metoprolol tartrate (LOPRESSOR) 25 mg tablet    Other Relevant Orders    CARDIAC PROCEDURE (Completed)    INVASIVE VASCULAR PROCEDURE (Completed)    Cardiac arrest (HCC)        Relevant Medications    metoprolol tartrate (LOPRESSOR) 25 mg tablet    Bacteremia due to Streptococcus        Leukocytosis, unspecified type        Elevated liver enzymes        Relevant Medications    magnesium hydroxide (Dior Milk of Magnesia) 400 mg/5 mL suspension    pantoprazole (PROTONIX) 40 mg tablet    Goals of care, counseling/discussion        DNR (do not resuscitate) discussion        CHB (complete heart block) (HCC)        Relevant Medications    metoprolol tartrate (LOPRESSOR) 25 mg tablet    Other Relevant Orders    CARDIAC PROCEDURE (Completed)    INVASIVE VASCULAR PROCEDURE (Completed)

## 2021-04-29 NOTE — PROGRESS NOTES
Transition of care note:    RUR 20% -moderate to high risk for readmission    Pt is intubated (Peep 6,Fio2 60%)    Repeat RAMAN completed -please see cardiologist's note. I appreciate Palliative physician's follow-up with pt.'s daughter,  If pt does not progress in the next 24 to 50 hours,daughter is considering a compassionate transitioning into comfort care. A back-up plan ,as discussed previously with pt.'s daughter before today's Palliative session,was for pt to go to the SNF @ [facility] as he would need a higher level of care than @ assisted living. I am off tomorrow but will hand-off to the covering  . I will be here this weekend.     Georgie Quintero  CM

## 2021-04-29 NOTE — PROGRESS NOTES
0700- Bedside and Verbal shift change report given to [address] Lisa Ahumada (oncoming nurse) by Bentley Lozano (offgoing nurse). Report included the following information SBAR, Kardex, ED Summary, Procedure Summary, Intake/Output, MAR, Recent Results, Cardiac Rhythm paced/NSR and Alarm Parameters . Primary Nurse Steven Waite, RN and Maria Elena Thompson RN performed a dual skin assessment on this patient No impairment noted  Fabio score is 12  0800-Assessment complete. Pt turned and repositioned. Mouth care and suctioning complete. 1000- Pt turned and repositioned. Mouth care and suctioning complete. [phone]- Dr. Shashank Ramso at bedside talking with daughter about possible comfort care and goals of care. 1200- Reassessment complete, no changes. Pt turned and repositioned. Mouth care and suctioning complete. 1230- Echo tech at bedside to assist Dr. Torsten Stephens with RAMAN procedure. 1241- Time out for procedure completed. 1245- 2mg versed administered per Dr. Pacheco Session orders. 56- Dr. Torsten Stephens gave verbal orders for 10mg vecuronium IV push one time for procedure. Pharmacy approved. Teleintensivist approved and ordered 100 mg Fentanyl to be administered along with Vecuronium. 1306- Procedure proceeds after medications administered. Writer at bedside. 1400- Pt turned and repositioned. Mouth care and suctioning complete. 1600- Reassessment complete, no changes. Pt turned and repositioned. Mouth care and suctioning complete. 1800- Pt turned and repositioned. Mouth care and suctioning complete. 1900- Bedside and Verbal shift change report given to Elizabeth Brar (oncoming nurse) by Tiago Rock (offgoing nurse). Report included the following information SBAR, Kardex, ED Summary, OR Summary, Procedure Summary, Intake/Output, MAR, Recent Results, Cardiac Rhythm NSR paced and Alarm Parameters .

## 2021-04-29 NOTE — PROGRESS NOTES
Palliative Medicine Consult  Gerry: [phone] ([phone])    Patient Name: Jaya Treviño  YOB: 1931    Date of Initial Consult: 4/26/21  Reason for Consult: care decisions  Requesting Provider: MARTINEZ Noble / Dr. Grant Ferreira  Primary Care Physician: Jori Woodard MD     SUMMARY:   Jaya Treviño is a 80 y.o. with a medical history significant for peripheral neuropathy, HTN, HLD, hypothyroidism, moderate AS, CAD s/p PCI and obesity who was sent to the ED from [place] [address] on 4/21/2021 with concerns of several days of weakness confounded by hypotension and bradycardia on the day of presentation. He was found to be in CHB and required transvenous pacing. While in the ED awaiting admission he lost a pulse and was noted to be in vib. He received one shock and one round of CPR, amio, epi, intubated. ROSC was achieved. He was taking urgently to the cath lab for placement of a temporary pacer via the right femoral, nonobstructive CAD noted. Workup has since revealed viridans streptococci bacteremia. He remains intubated and is intermittently febrile. Current medical issues leading to Palliative Medicine involvement include: high risk decline, support for care decisoins. Mr. John Pereira has been residing at [facility] for past 2 yrs. Prior to that in his own home in [address] [place] [address] he built in 2012. Recent baseline is near wheelchair bound due to neuropathy. Staff does assist in brief stints of walking. He has no cognitive deficits. Enjoys bingo, puzzles, socialization and time with family. Psychosocial Hx- . He has one daughter Sophia Weiss. Former marine. Worked at [employer] as a . He grew up in [address] [facility]. PALLIATIVE DIAGNOSES:   1. Goals of care counseling/discussion  2. ICU delirium  3. S/p in house cardiac arrest     4. Streptococcus sanguinis bacteremia  5. 3rd degree block- temporary pacer  6. BARAK on CKD  7.  Respiratory failure following cardiac arrest  8. H/o COVID-19 early Feb 2021 (did not require hospitalization)       PLAN:   1. Pt intubated. Continuous sedation off but continues to require prn IV Fentanyl and prn Versed for agitation. 2. Exhibiting eye opening to voice but no command following or purposeful movements to date. 3. 4/25 repeat blood cx no growth to date. 4. Source of bacteremia still uncertain but perivalvular abscess is suspected. 2nd RAMAN planned for today as first as well as CTA was unrevealing. Febrile last evening. 5. Goals of care. Follow up visit with daughter Tamera Blanca at bedside today. On Monday we reviewed her Father's goal of returning to his prior baseline which was limited to being wheelchair bound at an [facility]. She does not believe he would accept a more permanent reduction in function. We reviewed his current status, daughter reports in receipt of thorough regular updates from multiple physicians. Reviewed my concern for ICU delirium and potential for this to persist in the elderly. I am concerned about his prognosis for long term recovery / return to baseline. Discussed prognosis with Dr. Eri Farmer who agrees recovery to baseline will be a difficult feat. I will follow up again tomorrow morning following today's RAMAN. If he does progress to extubation post RAMAN this would help guide us as may reduce need for sedative meds. Comfort measures will be considered dependent upon his progress over next 24-48 hrs. 6. Code status- decision for DNR made on 4/26. Pt has a directive which supports this. 7. Thank you for the opportunity to participate in the care of Seda Griffith  8. Communicated plan of care with: Palliative [facility] Team including bedside RN Alva Dolan, cardiology NP Elias Galvez. GOALS OF CARE / TREATMENT PREFERENCES:     GOALS OF CARE:  Patient/Health Care Proxy Stated Goals:  Other (comment)(return to prior baseline- cognitively intact, wheelchair able)    TREATMENT PREFERENCES:   Code Status: DNR    Advance Care Planning:  [x] [facility] Interdisciplinary Team has updated the ACP Navigator with Health Care Decision Maker and Patient Capacity      Primary Decision Maker: Brandy Benito - Daughter - [phone]     Advance Care Planning 4/26/2021   Patient's Healthcare Decision Maker is: Named in scanned ACP document   Primary Decision Maker Name -   Primary Decision Maker Phone Number -   Primary Decision Maker Relationship to Patient -   Confirm Advance Directive Yes, on file       Medical Interventions: Limited additional interventions(DNR, no long term life support)     Artificially Administered Nutrition: No feeding tube     Other:    As far as possible, the palliative care team has discussed with patient / health care proxy about goals of care / treatment preferences for patient. HISTORY:     History obtained from:   Chart review, daughter    CHIEF COMPLAINT: n/a    HPI/SUBJECTIVE:    The patient is:   [] Verbal and participatory  [x] Non-participatory due to:   Intubated/sedated    Clinical Pain Assessment (nonverbal scale for severity on nonverbal patients):   Clinical Pain Assessment  Severity: 0     Activity (Movement): Lying quietly, normal position    Duration: for how long has pt been experiencing pain (e.g., 2 days, 1 month, years)  Frequency: how often pain is an issue (e.g., several times per day, once every few days, constant)     FUNCTIONAL ASSESSMENT:     Palliative Performance Scale (PPS):  PPS: 10       PSYCHOSOCIAL/SPIRITUAL SCREENING:     Palliative IDT has assessed this patient for cultural preferences / practices and a referral made as appropriate to needs (Cultural Services, Patient Advocacy, Ethics, etc.)    Any spiritual / Christianity concerns:  [] Yes /  [x] No    Caregiver Burnout:  [] Yes /  [x] No /  [] No Caregiver Present      Anticipatory grief assessment:   [x] Normal  / [] Maladaptive       ESAS Anxiety:      ESAS Depression: REVIEW OF SYSTEMS:     Positive and pertinent negative findings in ROS are noted above in HPI. The following systems were [x] reviewed / [] unable to be reviewed as noted in HPI  Other findings are noted below. Systems: constitutional, ears/nose/mouth/throat, respiratory, gastrointestinal, genitourinary, musculoskeletal, integumentary, neurologic, psychiatric, endocrine. Positive findings noted below. Modified ESAS Completed by: provider           Pain: 0           Dyspnea: 0           Stool Occurrence(s): 1        PHYSICAL EXAM:     From RN flowsheet:  Wt Readings from Last 3 Encounters:   04/26/21 199 lb (90.3 kg)   07/10/19 190 lb (86.2 kg)   11/26/18 184 lb (83.5 kg)     Blood pressure (!) 118/58, pulse 89, temperature 98.6 °F (37 °C), resp. rate 18, height 5' 7\" (1.702 m), weight 199 lb (90.3 kg), SpO2 93 %. Pain Scale 1: Adult Nonverbal Pain Scale  Pain Intensity 1: 0     Pain Location 1: Other (comment)([place])        Pain Intervention(s) 1: Medication (see MAR)  Last bowel movement, if known:     Obese white male  Remains on vent. Respirations compliant. Opens eyes to voice and stimulation. Grimaces frequently. No command following. Both arms move reflexively. Difficult to discern agitation from reflex responses.        HISTORY:     Active Problems:    Hypertension (8/1/2013)      Hyperlipidemia (8/1/2013)      BPH (benign prostatic hyperplasia) (8/1/2013)      Hypoxia (4/21/2021)      Third degree heart block (HCC) (4/21/2021)      Bradycardia (4/21/2021)      Elevated troponin (4/21/2021)      BARAK (acute kidney injury) ([address] (4/21/2021)      Cardiogenic shock ([address] (4/21/2021)      Asystole ([address] (4/21/2021)      Ventricular fibrillation ([address] (4/21/2021)      Past Medical History:   Diagnosis Date    BPH (benign prostatic hyperplasia)     CKD (chronic kidney disease)     5/28/15 pt denies kidney disease     DDD (degenerative disc disease), lumbar     Elevated PSA     Endocrine disease Chronic kidney disease    Heart murmur     Dr Gaines Favor     x6 months as of 5/28/15; being evaluated by Dr Afsaneh Bruno Hyperlipidemia     Hypertension     Dr Leatha Torres Hypothyroidism     Ill-defined condition     aortic stenosis     Ill-defined condition     benign prostatic hyperplasia     Ill-defined condition     heart murmur     Osteoarthritis, shoulder     Osteoporosis     Positive PPD, treated     treated with INH    Sensorineural hearing loss     as of 5/28/15 pt wears hearing aids    Sleep disorder     insomnia     Unspecified adverse effect of anesthesia 1960s    delayed awaking      Past Surgical History:   Procedure Laterality Date    HX CATARACT REMOVAL Bilateral 2004    HX COLONOSCOPY  2006    HX CYST REMOVAL  1960    HX INTRAOCULAR LENS PROSTHESIS      bilateral    HX LUMBAR LAMINECTOMY      [facility] - Dr Sarah Kirby  prior to     and bicep repair    ND TOTAL KNEE ARTHROPLASTY Right 14      Family History   Problem Relation Age of Onset    Cancer Father         lung    Stroke Mother     Diabetes Mother     Diabetes Sister     Cancer Sister       History reviewed, no pertinent family history. Social History     Tobacco Use    Smoking status: Former Smoker     Packs/day: 1.00     Years: 26.00     Pack years: 26.00     Types: Cigarettes     Quit date: 1976     Years since quittin.3    Smokeless tobacco: Never Used   Substance Use Topics    Alcohol use:  Yes     Alcohol/week: 1.0 standard drinks     Types: 1 Shots of liquor per week     Comment: 4-5x week burbon in afternoon     No Known Allergies   Current Facility-Administered Medications   Medication Dose Route Frequency    sodium chloride 0.9 % bolus infusion 500 mL  500 mL IntraVENous Q1H PRN    DOPamine (INTROPIN) 800 mg in dextrose 5% 250 mL infusion  0-20 mcg/kg/min IntraVENous TITRATE    fentaNYL (PF) 1,500 mcg/30 mL (50 mcg/mL) infusion 0-200 mcg/hr IntraVENous TITRATE    furosemide (LASIX) injection 40 mg  40 mg IntraVENous BID    scopolamine (TRANSDERM-SCOP) 1 mg over 3 days 1 Patch  1 Patch TransDERmal Q72H    levothyroxine (SYNTHROID) tablet 50 mcg  50 mcg Per NG tube 6am    pantoprazole (PROTONIX) 40 mg in 0.9% sodium chloride 10 mL injection  40 mg IntraVENous DAILY    metroNIDAZOLE (FLAGYL) IVPB premix 500 mg  500 mg IntraVENous Q12H    albuterol-ipratropium (DUO-NEB) 2.5 MG-0.5 MG/3 ML  3 mL Nebulization Q6H RT    midazolam (VERSED) injection 1 mg  1 mg IntraVENous Q2H PRN    cefTRIAXone (ROCEPHIN) 2 g in sterile water (preservative free) 20 mL IV syringe  2 g IntraVENous Q24H    heparin (porcine) injection 5,000 Units  5,000 Units SubCUTAneous Q8H    fentaNYL citrate (PF) injection 50 mcg  50 mcg IntraVENous Q1H PRN    sodium chloride (NS) flush 5-40 mL  5-40 mL IntraVENous Q8H    sodium chloride (NS) flush 5-40 mL  5-40 mL IntraVENous PRN    acetaminophen (TYLENOL) tablet 650 mg  650 mg Oral Q6H PRN    polyethylene glycol (MIRALAX) packet 17 g  17 g Oral DAILY PRN    ondansetron (ZOFRAN) injection 4 mg  4 mg IntraVENous Q6H PRN          LAB AND IMAGING FINDINGS:     Lab Results   Component Value Date/Time    WBC 7.9 04/29/2021 02:37 AM    HGB 9.2 (L) 04/29/2021 02:37 AM    PLATELET 614 09/40/5864 02:37 AM     Lab Results   Component Value Date/Time    Sodium 140 04/29/2021 02:37 AM    Potassium 3.9 04/29/2021 02:37 AM    Chloride 105 04/29/2021 02:37 AM    CO2 26 04/29/2021 02:37 AM    BUN 55 (H) 04/29/2021 02:37 AM    Creatinine 1.34 (H) 04/29/2021 02:37 AM    Calcium 8.4 (L) 04/29/2021 02:37 AM    Magnesium 2.2 04/29/2021 02:37 AM    Phosphorus 3.2 04/22/2021 06:33 AM      Lab Results   Component Value Date/Time    Alk.  phosphatase 323 (H) 04/29/2021 02:37 AM    Protein, total 6.6 04/29/2021 02:37 AM    Albumin 2.1 (L) 04/29/2021 02:37 AM    Globulin 4.5 (H) 04/29/2021 02:37 AM     Lab Results   Component Value Date/Time INR 1.1 04/22/2021 06:33 AM    Prothrombin time 11.1 04/22/2021 06:33 AM    aPTT 27.5 04/21/2021 06:31 PM      Lab Results   Component Value Date/Time    Iron 24 (L) 04/29/2021 02:37 AM    TIBC 175 (L) 04/29/2021 02:37 AM    Iron % saturation 14 (L) 04/29/2021 02:37 AM      Lab Results   Component Value Date/Time    pH 7.43 04/29/2021 05:31 AM    PCO2 41 04/29/2021 05:31 AM    PO2 68 (L) 04/29/2021 05:31 AM     No components found for: [place]   Lab Results   Component Value Date/Time    CK 38 (L) 04/24/2021 08:20 AM    CK - MB 4.1 (H) 11/26/2018 11:53 AM                Total time:  40m  Counseling / coordination time, spent as noted above: 40m  > 50% counseling / coordination?: y    Prolonged service was provided for  []30 min   []75 min in face to face time in the presence of the patient, spent as noted above. Time Start:   Time End:   Note: this can only be billed with 32217 (initial) or 36214 (follow up). If multiple start / stop times, list each separately.

## 2021-04-29 NOTE — PROGRESS NOTES
Progress Note  Date:2021       Room:[address] [place] [address]  Patient Name:Jose R Kimbrough     YOB: 1931     Age:89 y.o. Subjective    Subjective remains intubated and sedated; responsive to stimuli; recurrent bradycardia overnight but presently in 90s on dopamine  Review of Systemsunable to obtain secondary to ams/intubation  Objective         Vitals Last 24 Hours:  TEMPERATURE:  Temp  Av.5 °F (37.5 °C)  Min: 98.3 °F (36.8 °C)  Max: 100.5 °F (38.1 °C)  RESPIRATIONS RANGE: Resp  Av.9  Min: 14  Max: 59  PULSE OXIMETRY RANGE: SpO2  Av.2 %  Min: 90 %  Max: 97 %  PULSE RANGE: Pulse  Av.7  Min: 67  Max: 129  BLOOD PRESSURE RANGE: Systolic (88HOC), HXF:233 , Min:81 , JGL:353   ; Diastolic (41MDH), CSB:64, Min:39, Max:92    I/O (24Hr):     Intake/Output Summary (Last 24 hours) at 2021 1544  Last data filed at 2021 1300  Gross per 24 hour   Intake 183.67 ml   Output 2655 ml   Net -2471.33 ml     Objective   Exam facilitated by use of telemedicine platform and with assistance from in house team  Gen  intubated and sedated; nad  Head  nc/at  Eyes  anicteric sclera  Ent  normal external anatomy; ett without secretions or hemorrhage  Cvs  sinus rhythm without external evidence of hypoperfuson; I did not appreciate pacer spikes during my eval  Pulm  no autopeep by flow analysis; volume starvation by flow analysis - improved with change in TV; dropped RR thereafter   resulting platpress<20  Gi-no evidence of tenderness with passive movement  Ext  no c/c/e  Msk  normal bulk  Neuro  intubated, sedated; responds to stimuli but does not follow commands; no tremor  Labs/Imaging/Diagnostics    Labs:  CBC:  Recent Labs     21  0237 21  0316 21  0430   WBC 7.9 6.3 6.6   RBC 3.15* 2.98* 3.01*   HGB 9.2* 8.7* 8.8*   HCT 29.2* 27.9* 28.2*   MCV 92.7 93.6 93.7   RDW 14.0 14.0 14.0    271 240     CHEMISTRIES:  Recent Labs     21  1322 04/28/21  0316 04/27/21  0430    138 138   K 3.9 4.2 4.2    107 108   CO2 26 26 26   BUN 55* 57* 46*   CA 8.4* 8.2* 8.1*   MG 2.2 2.5* 2.3   PT/INR:No results for input(s): INR, INREXT in the last 72 hours. No lab exists for component: PROTIME  APTT:No results for input(s): APTT in the last 72 hours. LIVER PROFILE:  Recent Labs     04/29/21  0237   *   *     Lab Results   Component Value Date/Time    ALT (SGPT) 122 (H) 04/29/2021 02:37 AM    AST (SGOT) 107 (H) 04/29/2021 02:37 AM    Alk. phosphatase 323 (H) 04/29/2021 02:37 AM    Bilirubin, direct 0.2 04/29/2021 02:37 AM    Bilirubin, total 0.4 04/29/2021 02:37 AM       Imaging Last 24 Hours:  Xr Chest Port    Result Date: 4/29/2021  EXAM: XR CHEST PORT INDICATION: OGT placement COMPARISON: 4/20/2021 FINDINGS: A portable AP radiograph of the chest was obtained at 1405 hours. Endotracheal tube is 6.8 cm above the josé miguel. Feeding tube extends into the stomach. Other tubes and lines are stable. . Left lower lobe opacity in left pleural effusion or not significantly changed. Mild pulmonary edema suspected. Lumbar puncture lobe. Colette  Heart size is unremarkable. .  The bones and soft tissues are grossly within normal limits. Orogastric tube extends into the stomach.  Low lung volumes and left basilar opacity with possible mild edema unchanged    Assessment//Plan   Active Problems:    Hypertension (8/1/2013)      Hyperlipidemia (8/1/2013)      BPH (benign prostatic hyperplasia) (8/1/2013)      Hypoxia (4/21/2021)      Third degree heart block ([address] (4/21/2021)      Bradycardia (4/21/2021)      Elevated troponin (4/21/2021)      BARAK (acute kidney injury) ([address] (4/21/2021)      Cardiogenic shock ([address] (4/21/2021)      Asystole ([address] (4/21/2021)      Ventricular fibrillation ([facility] (4/21/2021)      Assessment & Plan   Sp Cardiac Arrest with ROSC  Acute Resp Failure with hypoxia  CHB s/p temporary Pacemaker  BARAK-improved  Shock,circulatory  Bacteremia: viridans strep     Neuro  remains intubated and sedated; hold on SAT/SBT to ensure no further procedures required    Cvs  s/p cardiac arrest and ROSC; remains paced and await PPM for CHB pending clearance of infection/source identification and control for strep viridians bacteremia (no evidence of valvular disease or abscess noted on TTE or RAMAN)  - estimated EF 55 - 60% with normal LV and RV by report    pulm  remains intubated with improved oxygenation but continued need to support airway; IBW is 66kg and 8,7,6ml/kg translates to 530, 460, 400 and adjustments made as above with improived waveworms/WOB    gi  as remains on PPV, should receive PUD proph  - abnormal but nonspecific gallbladder on CT and US demonstrated thickened gallbladder wall with pericholecystic fluid - this could be acalculous cholecystitis and his presentation c/w this differential; that said, clinically improving and afebrile on current regimen - no surgical intervention required; defer to ID and surgery if suspicion high enough and acceptable risk:benefit to culture biliary fluid    gu  initial BARAK with improved parameters    heme  monitor for si/sx of hemorrhage; tolerating DVT proph    id  abx coverage as above; No evidence of vegetation or intracardiac abscess on RAMAN and fevers have resolves with repeat BCx negative    endo  monitor accu checks and reflex to scheduled regimen if needed    dispo - given reported goals of patient and family, reasonable to proceed with attempts at conventional sat/sbt and if fails SBT, could still extubate with ongoing aggressive care but within limitations of a DNI given desire to avoid ongoing artificial life support; this may complicate timing of placement(or non placement) of a Ppm and will need to coordinate with ID/cardiology over next 24-48 hrs.     CCT 55minutes excluding teaching and procedures    I performed all aspects of the physical examination via Telemedicine associated with two way audio and video communication and with the on-site assistance of the bedside nurse. I am located in [place] and the patient is located in [place] at [address]   The patient is critically ill in the ICU. I  personally  reviewed the pertinent medical records, laboratory/ pathology data and radiographic images. The decision making regarding this patient is as documented above, which was generated  following  discussion  with the multidisciplinary ICU team and creation of a treatment plan for  the patient. We discussed the patient's interval history and future coordination of care and  plans. The patient's medications  were reviewed and changes made as stipulated above. Due to  critical illness impairing one or more vital organs of this patient resulting in life threatening clinical situation  I have provided direct, frequent personal  assessment and manipulation in management plan.     Electronically signed by Alan Centeno MD on 4/29/2021 at 3:44 PM

## 2021-04-30 NOTE — PROGRESS NOTES
Cardiology Progress Note       ICU 
NAME:  Ronaldo Hsieh :   1931 MRN:   [id number] Assessment/Plan: 1. Strep sanguinous bacteria: RAMAN with no clear abcess or endocarditis. CT without abcess. Still have high suspicion for paravalvular abscess. RAMAN with no vegetation. abx per ID. 2. 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 Wire replaced , Plan for [facility] Monday. D/ W Dr. Janiya Solano 3. Acute resp failure: On SBT this am.  
4. Hypotension: weaning dopamine.   
 
 
  
 
Subjective:  
Cardiac ROS: intubated, following simple commands Previous Cardiac Eval 
21 ECHO RAMAN W OR WO CONTRAST 2021 Narrative · LV: Estimated LVEF is 55 - 60%. Normal cavity size, wall thickness and  
systolic function (ejection fraction normal). Wall motion: normal. 
· Aortic Valve: There is severe noncoronary leaflet calcification. There  
is moderate aortic stenosis. Trace aortic valve regurgitation. There is no  
obvious vegetation on the aortic valve, however it is difficult to rule  
out endocarditis on the severely calcified non-coronary cusp. There is  
slight thickening of the aortic root, however no abscess is seen. · There is no obvious vegetation on the aortic valve, however it is  
difficult to rule out endocarditis on the severely calcified non-coronary  
cusp. Will review images with my colleagues. Signed by: Oniel Salmeron MD  
 
21 CARDIAC PROCEDURE 2021 Narrative R CFA - micropuncture; ultrasound, fluoroscopic guided access - 6 F sheath 
R FV -  micropuncture; ultrasound, fluoroscopic guided access; 9 F cordis 
  
5 F balloon tip/Temp pacer wire inserted - 60 bpm/5 mA  
  
  
L Main: Short; Nml 
  
LAD: Med; MLI; D1 - MLI 
  
LCflex: Med; MLI 
  
RCA: Med Dominant; Mid 50%;  Distal stent patent; PLB - TAM; PDA - small -  
ostial 80% 
  
LVEDP: 26 mm Hg 
  
LVEF: Hand injection / No well visualised 
  
Straight wire used to cross valve - AV - mod stenosis - Peak to peak  
gradient 30 mm Hg PCI: none 
  
  
Specimens Removed : None 
  
Complications: None 
  
Closure Device: R CFA - manual 
  
R FV - Cordis sutured in situ 
  
Pt was in SVT - prob sinus tach vs aflutter with 2:1 block  - 130bpm - Epi  
weaned off and Levophed gtt weaned off. SBP dec from 170 -110's.  
  
When AV was crossed - SVT changed to paced rhythm - 60bpm.  SBP in 100's. Levophed gtt restarted at 5mcg 
   
  Signed by: Shola Gomez MD  
 
 
 
Review of Systems: intubated able to nod yes/no Objective:  
 
Visit Vitals BP (!) 112/56 Pulse 96 Temp 99.2 °F (37.3 °C) Resp 13 Ht 5' 7\" (1.702 m) Wt 90.3 kg (199 lb 1.2 oz) SpO2 95% BMI 31.18 kg/m² O2 Flow Rate (L/min): 10 l/min O2 Device: Endotracheal tube, Ventilator Temp (24hrs), Av.1 °F (37.3 °C), Min:98.3 °F (36.8 °C), Max:99.6 °F (37.6 °C) No intake/output data recorded.  1901 -  0700 In: 351.5 [I.V.:131.5] Out: 3380 [XUFSM:[id number]] TELE: SR 90 General: in NAD. HEENT: Atraumatic. Orally intubated, OG, oral mucosa moist.  Anicteric sclerae. Neck : R EJ pacer wire Lungs: CTA bilaterally. No wheezing/rhonchi/rales. Heart: Regular rhythm, No JVD. Abdomen: Soft, non-distended, + Bowel sounds. Extremities: + 1 LE . Prevalon boots on. Neurologic: Grossly intact. Spont eye opening, moving arms and legs. Care Plan discussed with: 
  Comments Patient Family  x dtr  RN x Care Manager Consultant:  mundo Data Review: No lab exists for component: ITNL No results for input(s): CPK, CKMB, TROIQ in the last 72 hours. Recent Labs 21 
0400 21 
0237 21 
9337  140 138  
K 3.8 3.9 4.2  105 107 CO2 26 26 26 BUN 58* 55* 57* CREA 1.40* 1.34* 1.44* * 123* 129* MG 2.3 2.2 2.5* ALB  --  2.1*  --   
WBC 8.9 7.9 6.3 HGB 10.0* 9.2* 8.7* HCT 31.3* 29.2* 27.9*  
PLT 363 305 271 No results for input(s): INR, PTP, APTT, INREXT, INREXT in the last 72 hours. Medications reviewed Current Facility-Administered Medications Medication Dose Route Frequency  sodium chloride 0.9 % bolus infusion 500 mL  500 mL IntraVENous Q1H PRN  
 DOPamine (INTROPIN) 800 mg in dextrose 5% 250 mL infusion  0-20 mcg/kg/min IntraVENous TITRATE  fentaNYL (PF) 1,500 mcg/30 mL (50 mcg/mL) infusion  0-200 mcg/hr IntraVENous TITRATE  furosemide (LASIX) injection 40 mg  40 mg IntraVENous BID  scopolamine (TRANSDERM-SCOP) 1 mg over 3 days 1 Patch  1 Patch TransDERmal Q72H  levothyroxine (SYNTHROID) tablet 50 mcg  50 mcg Per NG tube 6am  
 pantoprazole (PROTONIX) 40 mg in 0.9% sodium chloride 10 mL injection  40 mg IntraVENous DAILY  metroNIDAZOLE (FLAGYL) IVPB premix 500 mg  500 mg IntraVENous Q12H  
 albuterol-ipratropium (DUO-NEB) 2.5 MG-0.5 MG/3 ML  3 mL Nebulization Q6H RT  
 midazolam (VERSED) injection 1 mg  1 mg IntraVENous Q2H PRN  
 cefTRIAXone (ROCEPHIN) 2 g in sterile water (preservative free) 20 mL IV syringe  2 g IntraVENous Q24H  
 heparin (porcine) injection 5,000 Units  5,000 Units SubCUTAneous Q8H  
 fentaNYL citrate (PF) injection 50 mcg  50 mcg IntraVENous Q1H PRN  
 sodium chloride (NS) flush 5-40 mL  5-40 mL IntraVENous Q8H  
 sodium chloride (NS) flush 5-40 mL  5-40 mL IntraVENous PRN  
 acetaminophen (TYLENOL) tablet 650 mg  650 mg Oral Q6H PRN  polyethylene glycol (MIRALAX) packet 17 g  17 g Oral DAILY PRN  
 ondansetron (ZOFRAN) injection 4 mg  4 mg IntraVENous Q6H PRN Sharad Adams NP

## 2021-04-30 NOTE — PROGRESS NOTES
At about 1330 patient self extubated, and placed on 6 L/M NC,  he appears labor and tachypnic. Bipap outside the room if PT needed. Racemic epinephrine given for coarse upper airway sound.

## 2021-04-30 NOTE — PROGRESS NOTES
Palliative Medicine Social Work Note      Palliative team (Dr. Anders Vera, [facility]) regrouped with dtr Angel Heard and son-in-law Melissa Kessler at bedside later in afternoon; pt had self-extubated shortly before. Pt was awake but non-verbal, eyes were open but not necessarily focusing on anyone in room, respirations were labored, pt appeared to be in significant distress. Dtr indicated that pt would not want to be re-intubated if not expected to recover. Family was educated re: use of bipap vs transitioning to comfort measures; decision was made to focus on comfort/symptom management. Family declined offer for  support at this time, expressed wish for private time with pt but are aware that Chaplains have been following closely and are available for support/prayer. Discussed with RN, , and Care Manager. Will continue to follow. Thank you for including Palliative Medicine in the care of Mr. Mehdi Mg.      [address] [place], [place], [facility]  Palliative Medicine:  [phone]

## 2021-04-30 NOTE — DISCHARGE SUMMARY
Patient with multiple medical problems including stage IV CKD, hyperlipidemia, hypothyroidism was admitted due to not feeling well and hypotension. He was found to be bradycardic in the emergency room with third degree AV block. Patient was started on transcutaneous pacing and subsequently went into brief V. fib and asystole requiring CPR and cardioversion. He was intubated on pressors due to hypotension. Sensitization patient was taken to the Cath Lab for temporary pacemaker placement. Emergently had pacer placed and  had it switched out from femoral to subclavian on . No abscess seen on RAMAN  and , CTA chest on  still have high suspicion for paravalvular abscess. Cath unremarkable. 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 Consulted ID who who started high dose of ceftriaxone and Flagyl . Today, patient self extubated. Palliative care had a discussion with pt;s daughter and daughter requested comfort measures only. No reintubation. Comfort measures started, but later patient .     Cause of death: Acute respiratory failure

## 2021-04-30 NOTE — PROGRESS NOTES
Kai Naidu [facility] 
[address] [facility], [place], [address] [place] [address] 
[phone] Medical Progress Note NAME: Tanya Magana :  1931 MRM:  [id number] Date of service: 2021  10:55 AM 
 
  
Assessment and Plan: 1. Acute respiratory failure - POA post arrest. Remains intubated. Vent management per intensivist.  
  
2. Third degree heart block / Ventricular fibrillation / post Cardiopulmonary arrest / Elevated troponin / Hx Hypertension - Emergently had pacer placed and  had it switched out from femoral to subclavian on . Cardiology consult appreciated. No abscess seen on RAMAN  and , CTA chest on  still have high suspicion for paravalvular abscess. Cath unremarkable. Planned for PPM on Monday  
  
3. Streptococcus Bacteremia - POA, due to endocarditis, vs gallbladder. Consulted ID who are managing antibiotics. They consulted general surgery and no plan for surgical intervention at this juncture  Continue high dose ceftriaxone and flagyl. Repeat blood cx NGTD 
  
4. Septic Shock / Fever - Likely cardiogenic and septic. now resolved.  
  
5. Anemia - satble. monitor  
  
6. BARAK / CKD 3 - POA, due to hypoperfusion, now stable after IVF. Avoid nephrotoxins 7. Gallbladder thickening - Outpatient follow up. Evaluated by gen surgery  
  
8. Hyperlipidemia - Holding meds Subjective: Chief Complaint[de-identified] Patient was seen and examined as a follow up for acute resp failure. Chart was reviewed. on SBT 
 
ROS: 
(bold if positive, if negative) Tolerating PT  Tolerating Diet Objective:  
 
Last 24hrs VS reviewed since prior progress note. Most recent are: 
 
Visit Vitals BP (!) 125/48 Pulse 85 Temp 99.6 °F (37.6 °C) Resp (!) 48 Ht 5' 7\" (1.702 m) Wt 90.3 kg (199 lb 1.2 oz) SpO2 96% BMI 31.18 kg/m² SpO2 Readings from Last 6 Encounters:  
21 96% 07/10/19 95% 18 93% 18 96% 18 97% 08/01/18 98% O2 Flow Rate (L/min): 10 l/min Intake/Output Summary (Last 24 hours) at 4/30/2021 1055 Last data filed at 4/30/2021 1000 Gross per 24 hour Intake 273.75 ml Output 2650 ml Net -2376.25 ml Physical Exam: 
 
Gen:  Well-developed, well-nourished, in no acute distress HEENT:  Pink conjunctivae, PERRL, hearing intact to voice, moist mucous membranes Neck:  Supple, without masses, thyroid non-tender Resp:  No accessory muscle use, clear breath sounds without wheezes rales or rhonchi 
Card:  Grade 2/6 systolic murmur, normal S1, S2 without thrills, bruits or peripheral edema Abd:  Soft, non-tender, non-distended, normoactive bowel sounds are present, no palpable organomegaly and no detectable hernias Lymph:  No cervical or inguinal adenopathy Musc:  No cyanosis or clubbing Skin:  No rashes or ulcers, skin turgor is good Neuro:  Cranial nerves are grossly intact, no focal motor weakness, follows commands appropriately Psych:  Good insight, oriented to person, place and time, alert 
__________________________________________________________________ Medications Reviewed: (see below) Medications:  
 
Current Facility-Administered Medications Medication Dose Route Frequency  sodium chloride 0.9 % bolus infusion 500 mL  500 mL IntraVENous Q1H PRN  
 DOPamine (INTROPIN) 800 mg in dextrose 5% 250 mL infusion  0-20 mcg/kg/min IntraVENous TITRATE  fentaNYL (PF) 1,500 mcg/30 mL (50 mcg/mL) infusion  0-200 mcg/hr IntraVENous TITRATE  furosemide (LASIX) injection 40 mg  40 mg IntraVENous BID  scopolamine (TRANSDERM-SCOP) 1 mg over 3 days 1 Patch  1 Patch TransDERmal Q72H  levothyroxine (SYNTHROID) tablet 50 mcg  50 mcg Per NG tube 6am  
 pantoprazole (PROTONIX) 40 mg in 0.9% sodium chloride 10 mL injection  40 mg IntraVENous DAILY  metroNIDAZOLE (FLAGYL) IVPB premix 500 mg  500 mg IntraVENous Q12H  
 albuterol-ipratropium (DUO-NEB) 2.5 MG-0.5 MG/3 ML  3 mL Nebulization Q6H RT  
 midazolam (VERSED) injection 1 mg  1 mg IntraVENous Q2H PRN  
 cefTRIAXone (ROCEPHIN) 2 g in sterile water (preservative free) 20 mL IV syringe  2 g IntraVENous Q24H  
 heparin (porcine) injection 5,000 Units  5,000 Units SubCUTAneous Q8H  
 fentaNYL citrate (PF) injection 50 mcg  50 mcg IntraVENous Q1H PRN  
 sodium chloride (NS) flush 5-40 mL  5-40 mL IntraVENous Q8H  
 sodium chloride (NS) flush 5-40 mL  5-40 mL IntraVENous PRN  
 acetaminophen (TYLENOL) tablet 650 mg  650 mg Oral Q6H PRN  polyethylene glycol (MIRALAX) packet 17 g  17 g Oral DAILY PRN  
 ondansetron (ZOFRAN) injection 4 mg  4 mg IntraVENous Q6H PRN Lab Data Reviewed: (see below) Lab Review:  
 
Recent Labs 04/30/21 
0400 04/29/21 
0237 04/28/21 
5012 WBC 8.9 7.9 6.3 HGB 10.0* 9.2* 8.7* HCT 31.3* 29.2* 27.9*  
 305 271 Recent Labs 04/30/21 
0400 04/29/21 
0237 04/28/21 
4473  140 138  
K 3.8 3.9 4.2  105 107 CO2 26 26 26 * 123* 129* BUN 58* 55* 57* CREA 1.40* 1.34* 1.44* CA 9.1 8.4* 8.2* MG 2.3 2.2 2.5* ALB  --  2.1*  --   
TBILI  --  0.4  --   
ALT  --  122*  --   
 
Lab Results Component Value Date/Time Glucose (POC) 116 (H) 08/05/2014 08:59 PM  
 
Recent Labs 04/30/21 
1307 04/29/21 
3028 04/28/21 
4777 PH 7.49* 7.43 7.40 PCO2 36 41 40 PO2 71* 68* 120* HCO3 27* 26 24 FIO2 50 70 70% No results for input(s): INR, INREXT in the last 72 hours. All Micro Results Procedure Component Value Units Date/Time CULTURE, BLOOD, PAIRED [[id number]] Collected: 04/27/21 1026 Order Status: Completed Specimen: Blood Updated: 04/30/21 5081 Special Requests: NO SPECIAL REQUESTS Culture result: NO GROWTH 3 DAYS     
 CULTURE, BLOOD, PAIRED [[id number]] Collected: 04/25/21 7538 Order Status: Completed Specimen: Blood Updated: 04/30/21 0049 Special Requests: NO SPECIAL REQUESTS   Culture result: NO GROWTH 5 DAYS CULTURE, BLOOD, PAIRED [[id number]] Collected: 04/24/21 0820 Order Status: Completed Specimen: Blood Updated: 04/29/21 0015 Special Requests: NO SPECIAL REQUESTS Culture result: NO GROWTH 5 DAYS     
 CULTURE, BLOOD [[id number]]  (Abnormal)  (Susceptibility) Collected: 04/22/21 0451 Order Status: Completed Specimen: Blood Updated: 04/25/21 6639 Special Requests: NO SPECIAL REQUESTS Culture result: STREPTOCOCCUS SANGUINIS GROWING IN BOTH BOTTLES DRAWN (SITE = L FOREARM) CULTURE, BLOOD [[id number]]  (Abnormal)  (Susceptibility) Collected: 04/22/21 0451 Order Status: Completed Specimen: Blood Updated: 04/25/21 0801 Special Requests: NO SPECIAL REQUESTS Culture result: STREPTOCOCCUS SANGUINIS GROWING IN BOTH BOTTLES DRAWN (SITE = L WRIST) CULTURE, BLOOD, PAIRED [[id number]] Collected: 04/24/21 0730 Order Status: Canceled Specimen: Blood MRSA CULTURE [[id number]] Collected: 04/22/21 0100 Order Status: Completed Specimen: Nasal from Nares Updated: 04/23/21 5458 Special Requests: NO SPECIAL REQUESTS Culture result: MRSA NOT PRESENT Screening of patient nares for MRSA is for surveillance purposes and, if positive, to facilitate isolation considerations in high risk settings. It is not intended for automatic decolonization interventions per se as regimens are not sufficiently effective to warrant routine use. CULTURE, BLOOD, PAIRED [[id number]] Order Status: Canceled Specimen: Blood MICRO TRACKING [[id number]] Collected: 04/22/21 0451 Order Status: Completed Updated: 04/23/21 2397 MICRO TRACKING [[id number]] Collected: 04/22/21 0451 Order Status: Completed Updated: 04/23/21 5584 CULTURE, RESPIRATORY/SPUTUM/BRONCH Efrain Anant [[id number]] Collected: 04/23/21 8828 Order Status: Canceled Specimen: Sputum,ET Suction MICRO TRACKING [[id number]] Collected: 04/22/21 0451  Order Status: Completed Updated: 04/23/21 8984 MICRO TRACKING [[id number]] Collected: 04/22/21 0451 Order Status: Completed Updated: 04/22/21 9681 I have reviewed notes of prior 24hr. Other pertinent lab: Total time spent with patient: 235 minutes I personally reviewed chart, notes, data and current medications in the medical record. I have personally examined and treated the patient at bedside during this period. Care Plan discussed with: Family, Nursing Staff and >50% of time spent in counseling and coordination of care Discussed:  Care Plan Prophylaxis:  SCD's Disposition:  Home w/Family 
        
___________________________________________________ Attending Physician: Tarah Gee MD

## 2021-04-30 NOTE — PROGRESS NOTES
1900 Bedside and Verbal shift change report given to Shahla Vera RN. (oncoming nurse) by Ventura Lantigua RN. (offgoing nurse). Report included the following information SBAR, Kardex, ED Summary, Procedure Summary, Intake/Output, MAR and Recent Results. Primary Nurse Iman Colin RN and Ventura Lantigua RN performed a dual skin assessment on this patient no impairment noted. See assessment for Fabio scale. Pt is on the vent suction set up at bedside.

## 2021-04-30 NOTE — PROGRESS NOTES
Palliative Medicine      Code Status: DNR    Advance Care Planning:  Advance Care Planning 4/26/2021   Patient's Healthcare Decision Maker is: Named in scanned ACP document   Primary Decision Maker Name Samaria Savage   Primary Decision Maker Phone Number [phone]      Primary Decision Maker Relationship to Patient Daughter   Confirm Advance Directive Yes, on file        Patient / Family Encounter Documentation    Participants (names):  Pt (saw after family meeting), dtr Fred Cullen, son-in-law Josue, Palliative Medicine (Dr. Lisa Pinedo, Sherine Lua)    Narrative:  Palliative Physician was approached by dtr Fred Cullen, who was in tears. Physician and SW sat with dtr outside ICU; son-in-law Katharina Esparza joined conversation, as well. Dtr had been surprised to hear that permanent pacemaker could be placed as soon as Monday 5/3, indicated some hesitancy to proceed. Dtr and MARIO spoke of pt's condition prior to hospitalization, shared that pt's mind had been sharp but body was not keeping up with pt's \"stubborn\" and independent spirit, shared that pt was increasingly frustrated. Dtr was updated re: potential plan for extubation in the next 24 hrs if pt does well on SBT. Dtr reports pt has been able to interact to some extent today even while intubated, expressed hope that pt will be lucid and able to make decision re: pacemaker himself once extubated. Discussed option of hospice care if pt is unable to be safely extubated or if decision is made not to pursue pacemaker. Palliative team made visit to pt after family meeting; dtr and MARIO remained in lobby outside ICU. Pt was intubated but awake, was moving arms, did not follow commands though is reported to be very hard of hearing. Psychosocial Issues Identified/ Resilience Factors: Anticipatory grief, dtr is struggling with the emotional toll of making decisions on pt's behalf, particularly as an only child.   Dtr did inquire about caring for pt at dtr/MARIO's home with support from hospice; however, MARIO expressed concern that this might be too difficult for dtr from both a physical and emotional standpoint. No spiritual concerns expressed at this time; dtr has been noted to have good support from her [employer] prayer group. Goals of Care / Plan: Family to continue weighing options of pacemaker placement vs transition to hospice; dtr expressed hope that pt will be able to make the decision for himself. RN was updated at bedside re: above. Palliative team will continue to follow for support and ongoing goals of care conversations. Thank you for including Palliative Medicine in the care of Mr. Mehdi Mg.      giana  IVON Gregory, [facility]-  [phone])

## 2021-04-30 NOTE — PROGRESS NOTES
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 Concern for endocarditis however 2 separate RAMAN negative for vegetation or intracardiac abscess. Continue ceftriaxone. Repeat blood cultures sterile to date. · Fever. I suspect this was related to femoral transvenous pacer as fever has resolved since pacer site was moved to right EJ. Repeat blood cultures NGSF. UA bland CT C-A-P relatively unremarkable with exception of gallbladder. · Abnormal gallbladder on CT. Ultrasound reveals thickened gallbladder wall with pericholecystic fluid . With ongoing unexplained fevers and abnormal LFTs acalculous cholecystitis is in the differential.  General surgery following. Added anaerobic coverage with metronidazole  · Third-degree heart block. Temporary transvenous pacemaker emergently placed  and changed to right EJ 2020 due to ongoing fevers. No evidence of vegetation or intracardiac abscess on RAMAN x2. Fevers have resolved. Repeat blood cultures sterile ×3 sets. Dual-chamber PPM placement planned for Monday. · BARAK/CKD. Stable          Subjective:     Afebrile. Off sedation. On weaning trial     Objective:     Vitals:   Visit Vitals  BP (!) 125/48   Pulse 80   Temp 99.6 °F (37.6 °C)   Resp 22   Ht 5' 7\" (1.702 m)   Wt 199 lb 1.2 oz (90.3 kg)   SpO2 96%   BMI 31.18 kg/m²        Tmax:  Temp (24hrs), Av.3 °F (37.4 °C), Min:98.3 °F (36.8 °C), Max:99.6 °F (37.6 °C)      Exam:   Patient is intubated:  no    Physical Examination:   General:   Awake on vent. NAD   Head:  Normocephalic, atraumatic. Eyes:  Conjunctivae clear   Neck: Supple       Lungs:   No distress. Chest wall:     Heart:  Regular rate and rhythm. 4/6 BRIAN   Abdomen:   Soft, non-tender, non-distended   Extremities:  No edema. Skin:  No rash   Neurologic:  Unable to assess     Labs:        No lab exists for component: ITNL   No results for input(s): CPK, CKMB, TROIQ in the last 72 hours. Recent Labs     04/30/21  0400 04/29/21  0237 04/28/21  0316    140 138   K 3.8 3.9 4.2    105 107   CO2 26 26 26   BUN 58* 55* 57*   CREA 1.40* 1.34* 1.44*   * 123* 129*   MG 2.3 2.2 2.5*   ALB  --  2.1*  --    WBC 8.9 7.9 6.3   HGB 10.0* 9.2* 8.7*   HCT 31.3* 29.2* 27.9*    305 271     No results for input(s): INR, PTP, APTT, INREXT, INREXT in the last 72 hours.   Needs: urine analysis, urine sodium, protein and creatinine  Lab Results   Component Value Date/Time    Creatinine, urine 148.1 02/21/2014 10:54 AM         Cultures:     No results found for: SDES  Lab Results   Component Value Date/Time    Culture result: NO GROWTH 3 DAYS 04/27/2021 10:26 AM    Culture result: NO GROWTH 5 DAYS 04/25/2021 09:09 AM    Culture result: NO GROWTH 5 DAYS 04/24/2021 08:20 AM       Radiology:     Medications       Current Facility-Administered Medications   Medication Dose Route Frequency Last Admin    sodium chloride 0.9 % bolus infusion 500 mL  500 mL IntraVENous Q1H  mL at 04/29/21 0056    DOPamine (INTROPIN) 800 mg in dextrose 5% 250 mL infusion  0-20 mcg/kg/min IntraVENous TITRATE 1 mcg/kg/min at 04/30/21 1128    fentaNYL (PF) 1,500 mcg/30 mL (50 mcg/mL) infusion  0-200 mcg/hr IntraVENous TITRATE Stopped at 04/30/21 1135    furosemide (LASIX) injection 40 mg  40 mg IntraVENous BID 40 mg at 04/30/21 0906    scopolamine (TRANSDERM-SCOP) 1 mg over 3 days 1 Patch  1 Patch TransDERmal Q72H 1 Patch at 04/27/21 1725    levothyroxine (SYNTHROID) tablet 50 mcg  50 mcg Per NG tube 6am 50 mcg at 04/30/21 0639    pantoprazole (PROTONIX) 40 mg in 0.9% sodium chloride 10 mL injection  40 mg IntraVENous DAILY 40 mg at 04/30/21 0906    metroNIDAZOLE (FLAGYL) IVPB premix 500 mg  500 mg IntraVENous Q12H 500 mg at 04/30/21 1150    albuterol-ipratropium (DUO-NEB) 2.5 MG-0.5 MG/3 ML  3 mL Nebulization Q6H RT 3 mL at 04/30/21 0736    midazolam (VERSED) injection 1 mg  1 mg IntraVENous Q2H PRN 1 mg at 04/29/21 1219    cefTRIAXone (ROCEPHIN) 2 g in sterile water (preservative free) 20 mL IV syringe  2 g IntraVENous Q24H 2 g at 04/30/21 0906    heparin (porcine) injection 5,000 Units  5,000 Units SubCUTAneous Q8H 5,000 Units at 04/30/21 0640    fentaNYL citrate (PF) injection 50 mcg  50 mcg IntraVENous Q1H PRN 50 mcg at 04/29/21 1221    sodium chloride (NS) flush 5-40 mL  5-40 mL IntraVENous Q8H 10 mL at 04/30/21 0641    sodium chloride (NS) flush 5-40 mL  5-40 mL IntraVENous PRN 10 mL at 04/28/21 2201    acetaminophen (TYLENOL) tablet 650 mg  650 mg Oral Q6H  mg at 04/28/21 1823    polyethylene glycol (MIRALAX) packet 17 g  17 g Oral DAILY PRN      ondansetron (ZOFRAN) injection 4 mg  4 mg IntraVENous Q6H PRN             Case discussed with: Nursing and cardiology    Barbi Mejia DO

## 2021-04-30 NOTE — PROGRESS NOTES
Notified by nurse that Mr Antione Johnson in room 3007 appeared to be near EOL. Patient's daughter and son-in-law were at his bedside when  arrived. Family was very quiet and daughter was tearful. They denied having any concerns to share at that time. Assured them of ongoing  availability for support.  remained available in ICU until time of patient demise. Family declined further support. : Rev. Elias Burks.  Chong Opitz; [facility], to contact [phone] [address] call: [phone]

## 2021-04-30 NOTE — PROGRESS NOTES
4/30/2021  1:49 PM  Pt discussed in IDR rounds, is continuing to require medical management for acute respiratory failure, third degree heart block, Streptococcus Bacteremia, anemia  Transitions of Care Plan:  RUR 17 % Moderate Risk/Yellow  LOS 9 Days  1. Medical management continues ID, cardiology following  2. Acute respiratory failure, pt intubated on full vent support, SBT today, pt self-extubated   3. Third Degree heart block, plan for pacer 5/3  4. CM to follow through for treatment/response  5. Palliative is following,   6. DC dispo pending progress, pt resides at [facility], [facility] near [facility] bound d/t neuropathy, pt has [address]  7.  CM will follow for dispo/plan  JAJA Pollard

## 2021-04-30 NOTE — PROGRESS NOTES
Progress Note  Date:2021       Room:[address] [place] [address]  Patient Name:Jose R Kimbrough     YOB: 1931     Age:89 y.o. Subjective    CC: intubated and unable to obtain    24 HOUR: remains on vent.  50%/6. Dopamine at 3mcg. Objective         Vitals Last 24 Hours:  TEMPERATURE:  Temp  Av.4 °F (37.4 °C)  Min: 99.2 °F (37.3 °C)  Max: 99.6 °F (37.6 °C)  RESPIRATIONS RANGE: Resp  Av.3  Min: 13  Max: 48  PULSE OXIMETRY RANGE: SpO2  Av.6 %  Min: 92 %  Max: 98 %  PULSE RANGE: Pulse  Av  Min: 76  Max: 119  BLOOD PRESSURE RANGE: Systolic (00HPH), TBR:591 , Min:76 , A   ; Diastolic (83UZQ), JWN:88, Min:36, Max:92    I/O (24Hr): Intake/Output Summary (Last 24 hours) at 2021 1249  Last data filed at 2021 1200  Gross per 24 hour   Intake 242.55 ml   Output 2650 ml   Net -2407.45 ml     Objective   PE:  Gen: intubated,  Sedated  HEENT: ETT in place  Chest: symmetrical chest rise  CV: r/r/r  Abd: non-distended  Ext: no c/c/e  Neuro: awakens and following commands for nursing. No focal deficits. Labs/Imaging/Diagnostics    Labs:  CBC:  Recent Labs     21  0400 21   WBC 8.9 7.9 6.3   RBC 3.41* 3.15* 2.98*   HGB 10.0* 9.2* 8.7*   HCT 31.3* 29.2* 27.9*   MCV 91.8 92.7 93.6   RDW 14.1 14.0 14.0    305 271     CHEMISTRIES:  Recent Labs     21  0400 21    140 138   K 3.8 3.9 4.2    105 107   CO2 26 26 26   BUN 58* 55* 57*   CA 9.1 8.4* 8.2*   MG 2.3 2.2 2.5*   PT/INR:No results for input(s): INR, INREXT in the last 72 hours. No lab exists for component: PROTIME  APTT:No results for input(s): APTT in the last 72 hours. LIVER PROFILE:  Recent Labs     21  0237   *   *     Lab Results   Component Value Date/Time    ALT (SGPT) 122 (H) 2021 02:37 AM    AST (SGOT) 107 (H) 2021 02:37 AM    Alk.  phosphatase 323 (H) 2021 02:37 AM Bilirubin, direct 0.2 04/29/2021 02:37 AM    Bilirubin, total 0.4 04/29/2021 02:37 AM       Imaging Last 24 Hours:  Xr Chest Port    Result Date: 4/29/2021  EXAM: XR CHEST PORT INDICATION: OGT placement COMPARISON: 4/20/2021 FINDINGS: A portable AP radiograph of the chest was obtained at 1405 hours. Endotracheal tube is 6.8 cm above the josé miguel. Feeding tube extends into the stomach. Other tubes and lines are stable. . Left lower lobe opacity in left pleural effusion or not significantly changed. Mild pulmonary edema suspected. Lumbar puncture lobe. Conchita Hernadez Heart size is unremarkable. .  The bones and soft tissues are grossly within normal limits. Orogastric tube extends into the stomach. Low lung volumes and left basilar opacity with possible mild edema unchanged    Echo Sean W Or Wo Contrast    Result Date: 4/29/2021  · No intracardiac vegetations or abscess. · LV: Estimated LVEF is 65 - 70%. Normal cavity size, systolic function (ejection fraction normal) and diastolic function. Moderate concentric hypertrophy. Wall motion: normal. Normal left ventricular strain. · AV: Moderate aortic valve sclerosis with reduced excursion. Aortic valve leaflet calcification present. Mild aortic valve regurgitation is present. · MV: Mild mitral valve regurgitation is present. Assessment//Plan     81 y/o with hx HTN, CKD IV, HLD and hypothyroidism admitted 4/21 with hypotension and 3rd degree heart block, now s/p temp pacer with course complicated by bacteremia and resp failure. Third Degress HB:  -- temp pacer placed on 4/21 and replaced 4/28 due to fevers  -- PPM planned for 5/3.    -- dopamine currently on for hypotension apparently unrelated to HR.     Bacteremia:  -- strep sanguinous  -- SEAN neg x 2 for veg  -- ID following   -- repeat BC so far neg  -- Ctx per ID    Thickened Gallbladder:  -- no surgical intervention indicated at this time  -- per surgical consult 4/29, gallbladder drain might be indicated if TTE neg for endocarditis (has been x 2). -- currently has flagyl in addition to ctx per ID. Acute Resp Failure:  -- SBT  -- need to clarify reintubation plan if pt passes SBT and ready to extubate. -- shouldn't need to stay intubated for PPM on 5/3. Septic Shock:  -- low dose dopamine  -- likely related to sedation at this point given abx duration for bacteremia. -- continue abx as above    BARAK:  -- acute on chronic CKD 3  -- avoid nephrotoxins  -- no acute indication for renal replacement. CCM time 40 min. Pt at risk of life threatening deterioration from 3rd degree HB. Pt seen and evaluated via tele interaction. Audio and video used for this encounter.     Electronically signed by Iline Apgar, DO on 2021 at 12:49 PM

## 2021-04-30 NOTE — PROGRESS NOTES
0700- Bedside and Verbal shift change report given to Mayco DUMONT (oncoming nurse) by Randa Villalba (offgoing nurse). Report included the following information SBAR, Kardex, ED Summary, Procedure Summary, Intake/Output, MAR, Recent Results, Cardiac Rhythm ST to NSR paced and Alarm Parameters . Primary Nurse Usha Laws RN and Christoph Braxton RN performed a dual skin assessment on this patient No impairment noted  Fabio score is 12  0730- Assessment complete. 1111- Pt placed on SBT 5/5 by RT per Dr. Colleen Howe orders. 1130- Reassessment complete, see doc flowsheets. [facility] responded to ventilator alarm in pts' room. Pt self extubated. Nurses placed patient on nasal cannula and suctioned patient. Respiratory at bedside. Pt sating >93%. Pt was on SBT for >2 hrs and ready for extubation prior to self extubation. All sedation turned off at 1135. Pt was educated about not pulling lines and the possibility to cause harm prior to self extubation. Daughter present for education. 56- Dr. Hawa Null notified of patient status. Usha Laws RN. 80- Daughter of patient requested Dr. Evelyn Cespedes have discussion with patient and herself about future care and the next steps. Usha Laws RN.  4496- Dr. Evelyn Cespedes at bedside having discussion with family about pt.  1547- Racepinephrine administrated by RT.  1552- Pt now fully on comfort care. All measures taken will be to make pt comfortable. Daughter and Daughter's  at bedside. [place] 1124 Dr. Evelyn Cespedes about turning off pacer but leaving it in place. Okay to do so at this point in time. 1900- Bedside and Verbal shift change report given to [address] (oncoming nurse) by Memo Monique (offgoing nurse). Report included the following information SBAR, Kardex, ED Summary, Intake/Output, MAR, Recent Results, Cardiac Rhythm SB and Alarm Parameters .

## 2021-04-30 NOTE — PROGRESS NOTES
Problem: Ventilator Management  Goal: *Adequate oxygenation and ventilation  Outcome: Progressing Towards Goal     Problem: Falls - Risk of  Goal: *Absence of Falls  Description: Document Mikel Fall Risk and appropriate interventions in the flowsheet. Outcome: Progressing Towards Goal  Note: Fall Risk Interventions:       Mentation Interventions: Adequate sleep, hydration, pain control, Bed/chair exit alarm, Door open when patient unattended, Evaluate medications/consider consulting pharmacy, More frequent rounding, Room close to nurse's station, Toileting rounds, Update white board    Medication Interventions: Bed/chair exit alarm, Evaluate medications/consider consulting pharmacy    Elimination Interventions: Bed/chair exit alarm, Call light in reach, Toileting schedule/hourly rounds    History of Falls Interventions: Bed/chair exit alarm, Door open when patient unattended, Evaluate medications/consider consulting pharmacy, Consult care management for discharge planning, Investigate reason for fall, Vital signs minimum Q4HRs X 24 hrs (comment for end date)         Problem: Pressure Injury - Risk of  Goal: *Prevention of pressure injury  Description: Document Fabio Scale and appropriate interventions in the flowsheet.   Outcome: Progressing Towards Goal  Note: Pressure Injury Interventions:  Sensory Interventions: Assess changes in LOC, Assess need for specialty bed, Avoid rigorous massage over bony prominences, Check visual cues for pain, Discuss PT/OT consult with provider, Keep linens dry and wrinkle-free, Maintain/enhance activity level    Moisture Interventions: Absorbent underpads, Apply protective barrier, creams and emollients, Check for incontinence Q2 hours and as needed, Assess need for specialty bed, Internal/External urinary devices, Minimize layers    Activity Interventions: Assess need for specialty bed, Pressure redistribution bed/mattress(bed type), PT/OT evaluation    Mobility Interventions: Pressure redistribution bed/mattress (bed type), PT/OT evaluation, Turn and reposition approx.  every two hours(pillow and wedges)    Nutrition Interventions: Document food/fluid/supplement intake, Discuss nutritional consult with provider    Friction and Shear Interventions: Apply protective barrier, creams and emollients, Foam dressings/transparent film/skin sealants, Lift sheet, Minimize layers, Transferring/repositioning devices

## 2021-04-30 NOTE — PROGRESS NOTES
Palliative Medicine Consult  Gerry: [phone])    Patient Name: Neo Quinteros  YOB: 1931    Date of Initial Consult: 4/26/21  Reason for Consult: care decisions  Requesting Provider: MARTINEZ Pastrana / Dr. Jacey Blas  Primary Care Physician: Alisa Singh MD     SUMMARY:   Neo Quinteros is a 80 y.o. with a medical history significant for peripheral neuropathy, HTN, HLD, hypothyroidism, moderate AS, CAD s/p PCI and obesity who was sent to the ED from [place] [address] on 4/21/2021 with concerns of several days of weakness confounded by hypotension and bradycardia on the day of presentation. He was found to be in CHB and required transvenous pacing. While in the ED awaiting admission he lost a pulse and was noted to be in vib. He received one shock and one round of CPR, amio, epi, intubated. ROSC was achieved. He was taking urgently to the cath lab for placement of a temporary pacer via the right femoral, nonobstructive CAD noted. Workup has since revealed viridans streptococci bacteremia. He remains intubated and is intermittently febrile. Current medical issues leading to Palliative Medicine involvement include: high risk decline, support for care decisoins. Mr. Vee Sommer has been residing at [facility] for past 2 yrs. Prior to that in his own home in [address] [place] [address] he built in 2012. Recent baseline is near wheelchair bound due to neuropathy. Staff does assist in brief stints of walking. He has no cognitive deficits. Enjoys bingo, puzzles, socialization and time with family. Psychosocial Hx- . He has one daughter Janey Lee. Former marine. Worked at [employer] as a . He grew up in [address] [facility]. PALLIATIVE DIAGNOSES:   1. Goals of care counseling/discussion  2. ICU delirium  3. S/p in house cardiac arrest     4. Streptococcus sanguinis bacteremia  5. 3rd degree block- temporary pacer  6. BARAK on CKD  7.  Respiratory failure following cardiac arrest  8. H/o COVID-19 early Feb 2021 (did not require hospitalization)       PLAN:   1. Daughter requested meeting today to debrief. Met with her, her spouse and Leah Cruz LCSW outside ICU. Jose M Maldonado expressed her concern about overarching plan. She is worried about his ability to recover and return to prior baseline and questions wether ongoing procedures (PPM now planned for Monday) are valuable. We talked about the uncertainties that still exist- including wether or not he can tolerate extubation and where his cognition lies. The later is difficult to know until he is extubated and no longer requiring sedatives. Leeann appropriately tearful. Her spouse talked again about his limited quality of life prior to this event, all recognizing he was in a slow decline of late. SBT is planned for today. Reviewed that this trial wether positive or negative, will shed light on his current status. 2. Code status- already determined as DNR. 3. Thank you for the opportunity to participate in the care of Linda Swanson  4. Communicated plan of care with: Palliative IDT, [facility] Team including bedside RN. After meeting I was informed that patient self extubated. Asked to reevaluate and talk with family. On exam ~ 15:30 pt was found with inc RR to 40, hypertensive and diaphoretic. RT at bedside considering NIV. May require reintubation. Jose M Maldonado and her  at bedside stated that patient had shook his head to her that he does not want to be reintubated. Given advanced age / dementia I doubt he would tolerate a prolonged course of NIV. Reviewed a plan focused on his comfort which would include using medicines (opioids, etc) to relax his labored breathing allow a comfortable passing. I expect he will die within 24 hrs, shared with daughter.   If he survives this evening recommend Hospice evaluation tomorrow am.     Meds-  - start with Dilaudid 0.5 mg IV q15 min prn  - Ativan 1 mg IV q15 min prn  - Racemic epi nebulized x 1 due to stridor    Discussed above with Dr. Morton Prior. GOALS OF CARE / TREATMENT PREFERENCES:     GOALS OF CARE:  Patient/Health Care Proxy Stated Goals: Other (comment)(return to prior baseline- cognitively intact, wheelchair able)    TREATMENT PREFERENCES:   Code Status: DNR    Advance Care Planning:  [x] [facility] Interdisciplinary Team has updated the ACP Navigator with [address] and Patient Capacity      Primary Decision Maker: Tyson Iverson - Daughter - [phone]     Advance Care Planning 4/26/2021   Patient's Healthcare Decision Maker is: Named in scanned ACP document   Primary Decision Maker Name -   Primary Decision Maker Phone Number -   Primary Decision Maker Relationship to Patient -   Confirm Advance Directive Yes, on file       Medical Interventions: Limited additional interventions(DNR, no long term life support)     Artificially Administered Nutrition: No feeding tube     Other:    As far as possible, the palliative care team has discussed with patient / health care proxy about goals of care / treatment preferences for patient. HISTORY:     History obtained from:   Chart review, daughter    CHIEF COMPLAINT: n/a    HPI/SUBJECTIVE:    The patient is:   [] Verbal and participatory  [x] Non-participatory due to:   Intubated/sedated    Clinical Pain Assessment (nonverbal scale for severity on nonverbal patients):   Clinical Pain Assessment  Severity: 0     Activity (Movement): Lying quietly, normal position    Duration: for how long has pt been experiencing pain (e.g., 2 days, 1 month, years)  Frequency: how often pain is an issue (e.g., several times per day, once every few days, constant)     FUNCTIONAL ASSESSMENT:     Palliative Performance Scale (PPS):  PPS: 10       PSYCHOSOCIAL/SPIRITUAL SCREENING:     Palliative IDT has assessed this patient for cultural preferences / practices and a referral made as appropriate to needs (Cultural Services, Patient Advocacy, Ethics, etc.)    Any spiritual / Lutheran concerns:  [] Yes /  [x] No    Caregiver Burnout:  [] Yes /  [x] No /  [] No Caregiver Present      Anticipatory grief assessment:   [x] Normal  / [] Maladaptive       ESAS Anxiety:      ESAS Depression:          REVIEW OF SYSTEMS:     Positive and pertinent negative findings in ROS are noted above in HPI. The following systems were [x] reviewed / [] unable to be reviewed as noted in HPI  Other findings are noted below. Systems: constitutional, ears/nose/mouth/throat, respiratory, gastrointestinal, genitourinary, musculoskeletal, integumentary, neurologic, psychiatric, endocrine. Positive findings noted below. Modified ESAS Completed by: provider           Pain: 0           Dyspnea: 0           Stool Occurrence(s): 1        PHYSICAL EXAM:     From RN flowsheet:  Wt Readings from Last 3 Encounters:   04/29/21 199 lb 1.2 oz (90.3 kg)   07/10/19 190 lb (86.2 kg)   11/26/18 184 lb (83.5 kg)     Blood pressure 138/75, pulse 91, temperature 99.6 °F (37.6 °C), resp. rate 27, height 5' 7\" (1.702 m), weight 199 lb 1.2 oz (90.3 kg), SpO2 92 %. Pain Scale 1: Adult Nonverbal Pain Scale  Pain Intensity 1: 0     Pain Location 1: Other (comment)([place])        Pain Intervention(s) 1: Medication (see MAR)  Last bowel movement, if known:     Obese white male  Remains on vent. Respirations compliant. Opens eyes to voice and stimulation. Grimaces frequently. No command following. Both arms move reflexively. Difficult to discern agitation from reflex responses.        HISTORY:     Active Problems:    Hypertension (8/1/2013)      Hyperlipidemia (8/1/2013)      BPH (benign prostatic hyperplasia) (8/1/2013)      Hypoxia (4/21/2021)      Third degree heart block ([address] (4/21/2021)      Bradycardia (4/21/2021)      Elevated troponin (4/21/2021)      BARAK (acute kidney injury) ([address] (4/21/2021)      Cardiogenic shock ([address] (2021)      Asystole ([facility] (2021)      Ventricular fibrillation ([address] (2021)      Past Medical History:   Diagnosis Date    BPH (benign prostatic hyperplasia)     CKD (chronic kidney disease)     5/28/15 pt denies kidney disease     DDD (degenerative disc disease), lumbar     Elevated PSA     Endocrine disease     Chronic kidney disease    Heart murmur     Dr Jovany Ennis     x6 months as of 5/28/15; being evaluated by Dr Kailey Hernandez Hyperlipidemia     Hypertension     Dr Natalya Duffy Hypothyroidism     Ill-defined condition     aortic stenosis     Ill-defined condition     benign prostatic hyperplasia     Ill-defined condition     heart murmur     Osteoarthritis, shoulder     Osteoporosis     Positive PPD, treated     treated with INH    Sensorineural hearing loss     as of 5/28/15 pt wears hearing aids    Sleep disorder     insomnia     Unspecified adverse effect of anesthesia 1960s    delayed awaking      Past Surgical History:   Procedure Laterality Date    HX CATARACT REMOVAL Bilateral 2004    HX COLONOSCOPY  2006    HX CYST REMOVAL  1960    HX INTRAOCULAR LENS PROSTHESIS      bilateral    HX LUMBAR LAMINECTOMY      [facility] - Dr Regis Ward  prior to     and bicep repair    CO TOTAL KNEE ARTHROPLASTY Right 14      Family History   Problem Relation Age of Onset    Cancer Father         lung    Stroke Mother     Diabetes Mother     Diabetes Sister     Cancer Sister       History reviewed, no pertinent family history. Social History     Tobacco Use    Smoking status: Former Smoker     Packs/day: 1.00     Years: 26.00     Pack years: 26.00     Types: Cigarettes     Quit date: 1976     Years since quittin.3    Smokeless tobacco: Never Used   Substance Use Topics    Alcohol use:  Yes     Alcohol/week: 1.0 standard drinks     Types: 1 Shots of liquor per week     Comment: 4-5x week burbon in afternoon No Known Allergies   Current Facility-Administered Medications   Medication Dose Route Frequency    sodium chloride 0.9 % bolus infusion 500 mL  500 mL IntraVENous Q1H PRN    DOPamine (INTROPIN) 800 mg in dextrose 5% 250 mL infusion  0-20 mcg/kg/min IntraVENous TITRATE    fentaNYL (PF) 1,500 mcg/30 mL (50 mcg/mL) infusion  0-200 mcg/hr IntraVENous TITRATE    furosemide (LASIX) injection 40 mg  40 mg IntraVENous BID    levothyroxine (SYNTHROID) tablet 50 mcg  50 mcg Per NG tube 6am    pantoprazole (PROTONIX) 40 mg in 0.9% sodium chloride 10 mL injection  40 mg IntraVENous DAILY    metroNIDAZOLE (FLAGYL) IVPB premix 500 mg  500 mg IntraVENous Q12H    albuterol-ipratropium (DUO-NEB) 2.5 MG-0.5 MG/3 ML  3 mL Nebulization Q6H RT    midazolam (VERSED) injection 1 mg  1 mg IntraVENous Q2H PRN    cefTRIAXone (ROCEPHIN) 2 g in sterile water (preservative free) 20 mL IV syringe  2 g IntraVENous Q24H    heparin (porcine) injection 5,000 Units  5,000 Units SubCUTAneous Q8H    fentaNYL citrate (PF) injection 50 mcg  50 mcg IntraVENous Q1H PRN    sodium chloride (NS) flush 5-40 mL  5-40 mL IntraVENous Q8H    sodium chloride (NS) flush 5-40 mL  5-40 mL IntraVENous PRN    acetaminophen (TYLENOL) tablet 650 mg  650 mg Oral Q6H PRN    polyethylene glycol (MIRALAX) packet 17 g  17 g Oral DAILY PRN    ondansetron (ZOFRAN) injection 4 mg  4 mg IntraVENous Q6H PRN          LAB AND IMAGING FINDINGS:     Lab Results   Component Value Date/Time    WBC 8.9 04/30/2021 04:00 AM    HGB 10.0 (L) 04/30/2021 04:00 AM    PLATELET 539 43/24/6718 04:00 AM     Lab Results   Component Value Date/Time    Sodium 140 04/30/2021 04:00 AM    Potassium 3.8 04/30/2021 04:00 AM    Chloride 105 04/30/2021 04:00 AM    CO2 26 04/30/2021 04:00 AM    BUN 58 (H) 04/30/2021 04:00 AM    Creatinine 1.40 (H) 04/30/2021 04:00 AM    Calcium 9.1 04/30/2021 04:00 AM    Magnesium 2.3 04/30/2021 04:00 AM    Phosphorus 3.2 04/22/2021 06:33 AM      Lab Results   Component Value Date/Time    Alk. phosphatase 323 (H) 04/29/2021 02:37 AM    Protein, total 6.6 04/29/2021 02:37 AM    Albumin 2.1 (L) 04/29/2021 02:37 AM    Globulin 4.5 (H) 04/29/2021 02:37 AM     Lab Results   Component Value Date/Time    INR 1.1 04/22/2021 06:33 AM    Prothrombin time 11.1 04/22/2021 06:33 AM    aPTT 27.5 04/21/2021 06:31 PM      Lab Results   Component Value Date/Time    Iron 24 (L) 04/29/2021 02:37 AM    TIBC 175 (L) 04/29/2021 02:37 AM    Iron % saturation 14 (L) 04/29/2021 02:37 AM    Ferritin 388 04/29/2021 02:37 AM      Lab Results   Component Value Date/Time    pH 7.49 (H) 04/30/2021 06:33 AM    PCO2 36 04/30/2021 06:33 AM    PO2 71 (L) 04/30/2021 06:33 AM     No components found for: [place]   Lab Results   Component Value Date/Time    CK 38 (L) 04/24/2021 08:20 AM    CK - MB 4.1 (H) 11/26/2018 11:53 AM                Total time:  75m  Counseling / coordination time, spent as noted above: 75m  > 50% counseling / coordination?: y    Prolonged service was provided for  []30 min   []75 min in face to face time in the presence of the patient, spent as noted above. Time Start: 11:45  Time End:  12:30  Time start:  15:20  Time end:  15:50  Note: this can only be billed with  (initial) or [id number] (follow up). If multiple start / stop times, list each separately.

## 2021-05-02 LAB
BACTERIA SPEC CULT: NORMAL
SERVICE CMNT-IMP: NORMAL

## 2023-01-01 NOTE — CARDIO/PULMONARY
Cardiopulmonary Rehab Nursing Entry:    Pt completed 36/36 exercise sessions. Pt s/p PCI/EZRA 6/6/17. Pt completed exit interview packet. Toolbox results discussed with pt. Pt initially was not completing any exercising. Pt has been exercising >90 minutes/week and does upper body exercises as prescribed by PT. Pt with improvements in [facility] Quality of Life Index score, Rate Your Plate and increased distance in his ^ minute walk test from 196m to 282m. His [facility] Activity Index decreased mets from 6.2 to 4.3 pt attributes this to chronic knee pain that limits his activity. Goals were reviewed with pt: To eat less fried foods. Pt reports achievement. To incorporate upper body exercises in his home routine. Pt achieved, he does home PT exercises on a daily basis. To lose 5lbs. Achieved. Weight decreased from 186.6lbs at the beginning of the program to 181lbs on exit. Pt plans to continue home walking as tolerated, UE exercises and join a local gym close to his home near [address] [place] [address].
Patient/Caregiver provided printed discharge information.

## (undated) DEVICE — PACK PROCEDURE SURG HRT CATH

## (undated) DEVICE — PINNACLE INTRODUCER SHEATH: Brand: PINNACLE

## (undated) DEVICE — ROSEN CURVED WIRE GUIDE: Brand: ROSEN

## (undated) DEVICE — SUTURE PERMA-HAND 2-0 L30IN NONABSORBABLE BLK MH L36MM 1/2 K843H

## (undated) DEVICE — AIRLIFE™  ADULT CUSHION NASAL CANNULA WITH 7 FOOT (2.1 M) CRUSH-RESISTANT OXYGEN TUBING, AND U/CONNECT-IT ADAPTER: Brand: AIRLIFE™

## (undated) DEVICE — SYR ART 700 CLEAR MARK 7 -- ARTERION

## (undated) DEVICE — GUIDEWIRE VASC L145CM 0.035IN J TIP L3MM PTFE FIX COR NAMIC

## (undated) DEVICE — CATHETER ETER CARD MULTIPAK MULTIPAK 5FR PERFORMA

## (undated) DEVICE — MICROPUNCTURE INTRODUCER SET SILHOUETTE TRANSITIONLESS PUSH-PLUS DESIGN - STIFFENED CANNULA WITH STAINLESS STEEL WIRE GUIDE: Brand: MICROPUNCTURE

## (undated) DEVICE — PROCEDURE KIT FLUID MGMT CUST MAINFOLD STRL

## (undated) DEVICE — KIT SHTH INTRO 9FR L10CM PERC INTEGR HEMSTAS VLV SIDEPRT

## (undated) DEVICE — GUIDEWIRE VASC L80CM DIA0.018IN TIP L5CM 15DEG ANG NIT

## (undated) DEVICE — 6 FOOT DISPOSABLE EXTENSION CABLE WITH SAFE CONNECT / SCREW-DOWN

## (undated) DEVICE — SLEEVE CONTAMINATION SFSHTH

## (undated) DEVICE — DRESSING HEMOSTATIC SFT INTVENT W/O SLT DBL WRP QUIKCLOT LF

## (undated) DEVICE — ANGIO-SEAL VIP VASCULAR CLOSURE DEVICE: Brand: ANGIO-SEAL

## (undated) DEVICE — FIXED CORE WIRE GUIDE STRAIGHT: Brand: COOK

## (undated) DEVICE — CATH DIAG-D 6F MULTI PIG 155 5 -- IMPULSE 16599-302

## (undated) DEVICE — ANGIOGRAPHY KIT CUST [K0910930B] [MERIT MEDICAL SYSTEMS INC]

## (undated) DEVICE — INTRODUCER SHTH 5 FRX30 CM 7 CM W/ NIT WIRE REG MICRO-STICK

## (undated) DEVICE — DILATOR: Brand: COOK

## (undated) DEVICE — SUTURE PERMAHAND SZ 2-0 L18IN NONABSORBABLE BLK L26MM PS 1588H